# Patient Record
Sex: FEMALE | Race: WHITE | Employment: UNEMPLOYED | URBAN - METROPOLITAN AREA
[De-identification: names, ages, dates, MRNs, and addresses within clinical notes are randomized per-mention and may not be internally consistent; named-entity substitution may affect disease eponyms.]

---

## 2020-04-06 ENCOUNTER — HOSPITAL ENCOUNTER (EMERGENCY)
Age: 45
Discharge: HOME | End: 2020-04-06
Attending: EMERGENCY MEDICINE
Payer: MEDICAID

## 2020-04-06 VITALS
SYSTOLIC BLOOD PRESSURE: 103 MMHG | BODY MASS INDEX: 20.62 KG/M2 | HEART RATE: 79 BPM | RESPIRATION RATE: 18 BRPM | TEMPERATURE: 98.1 F | OXYGEN SATURATION: 97 % | DIASTOLIC BLOOD PRESSURE: 61 MMHG | HEIGHT: 60 IN | WEIGHT: 105 LBS

## 2020-04-06 PROCEDURE — 99283 EMERGENCY DEPT VISIT LOW MDM: CPT

## 2020-04-06 PROCEDURE — 12032 INTMD RPR S/A/T/EXT 2.6-7.5: CPT

## 2020-04-06 RX ORDER — LAMOTRIGINE 100 MG/1
100 TABLET ORAL DAILY
Status: ON HOLD | COMMUNITY
End: 2022-09-21 | Stop reason: HOSPADM

## 2020-04-06 RX ORDER — METHADONE HYDROCHLORIDE 40 MG/1
95 TABLET ORAL DAILY
Status: ON HOLD | COMMUNITY
End: 2022-09-21 | Stop reason: HOSPADM

## 2020-04-06 RX ORDER — BACITRACIN, NEOMYCIN, POLYMYXIN B 400; 3.5; 5 [USP'U]/G; MG/G; [USP'U]/G
OINTMENT TOPICAL
Status: DISCONTINUED
Start: 2020-04-06 | End: 2020-04-06 | Stop reason: HOSPADM

## 2020-04-06 RX ORDER — LIDOCAINE HYDROCHLORIDE 10 MG/ML
INJECTION, SOLUTION EPIDURAL; INFILTRATION; INTRACAUDAL; PERINEURAL
Status: DISCONTINUED
Start: 2020-04-06 | End: 2020-04-06 | Stop reason: HOSPADM

## 2020-04-06 ASSESSMENT — PAIN DESCRIPTION - DESCRIPTORS: DESCRIPTORS: BURNING

## 2020-04-06 ASSESSMENT — PAIN DESCRIPTION - ORIENTATION: ORIENTATION: RIGHT

## 2020-04-06 ASSESSMENT — PAIN SCALES - GENERAL: PAINLEVEL_OUTOF10: 8

## 2020-04-06 NOTE — ED PROVIDER NOTES
4/20/2020  You will receive a call to assist you, in setting up a primary care physician. See for suture removal in 10-14 days. Discharge Medications:  New Prescriptions    No medications on file       FINAL IMPRESSION  1. Laceration of right forearm, initial encounter        Blood pressure 103/61, pulse 79, temperature 98.1 °F (36.7 °C), temperature source Oral, resp. rate 18, height 5' (1.524 m), weight 105 lb (47.6 kg), last menstrual period 03/09/2020, SpO2 97 %. DISPOSITION  Patient was discharged to home in good condition. This chart was generated using the [address] [place] [address] dictation system. I created this record but it may contain dictation errors given the limitations of this technology.        Alexandra Sorto MD  04/06/20 2477

## 2020-04-06 NOTE — DISCHARGE INSTR - COC
Continuity of Care Form    Patient Name: Kan Landon   :  1975  MRN:  [id number]    Admit date:  2020  Discharge date:  ***    Code Status Order: No Order   Advance Directives:     Admitting Physician:  No admitting provider for patient encounter. PCP: No primary care provider on file. Discharging Nurse: [facility] Unit/Room#: ED-0007/07  Discharging Unit Phone Number: ***    Emergency Contact:   Extended Emergency Contact Information  Primary Emergency Contact: Radha Koch  Home Phone: [phone]  Relation: Spouse   needed? No    Past Surgical History:  Past Surgical History:   Procedure Laterality Date     SECTION         Immunization History: There is no immunization history on file for this patient. Active Problems: There is no problem list on file for this patient.       Isolation/Infection:   Isolation          No Isolation        Patient Infection Status     None to display          Nurse Assessment:  Last Vital Signs: /61   Pulse 79   Temp 98.1 °F (36.7 °C) (Oral)   Resp 18   Ht 5' (1.524 m)   Wt 105 lb (47.6 kg)   LMP 2020 (Within Weeks)   SpO2 97%   BMI 20.51 kg/m²     Last documented pain score (0-10 scale): Pain Level: 8  Last Weight:   Wt Readings from Last 1 Encounters:   20 105 lb (47.6 kg)     Mental Status:  {IP PT MENTAL STATUS:83335}    IV Access:  { ROBERT IV ACCESS:203804063}    Nursing Mobility/ADLs:  Walking   {CHP DME EBYQ:663721096}  Transfer  {CHP DME QXYD:180177033}  Bathing  {CHP DME GLRS:561093508}  Dressing  {CHP DME GGMX:140592945}  Toileting  {CHP DME LMHF:903595968}  Feeding  {CHP DME VWXP:731626235}  Med Admin  {CHP DME CLGI:315647445}  Med Delivery   { ROBERT MED Delivery:236427962}    Wound Care Documentation and Therapy:        Elimination:  Continence:   · Bowel: {YES / DS:16070}  · Bladder: {YES / RS:49699}  Urinary Catheter: {Urinary Catheter:324319758}   Colostomy/Ileostomy/Ileal

## 2020-04-06 NOTE — ED NOTES
Right arm cleaned with saline. 3 small superficial scrapes noted to hand. PSO applied to sutured area, and wound covered with adaptic and 3 inch Abigail. Pt instructed on care instructions. Discharged as noted.      Harshad Matute RN  04/06/20 5254

## 2022-09-12 ENCOUNTER — APPOINTMENT (OUTPATIENT)
Dept: CT IMAGING | Age: 47
DRG: 720 | End: 2022-09-12
Payer: MEDICARE

## 2022-09-12 ENCOUNTER — HOSPITAL ENCOUNTER (INPATIENT)
Age: 47
LOS: 9 days | Discharge: HOME | DRG: 720 | End: 2022-09-21
Attending: EMERGENCY MEDICINE | Admitting: INTERNAL MEDICINE
Payer: MEDICARE

## 2022-09-12 DIAGNOSIS — N30.00 ACUTE CYSTITIS WITHOUT HEMATURIA: ICD-10-CM

## 2022-09-12 DIAGNOSIS — A41.9 SEVERE SEPSIS (HCC): ICD-10-CM

## 2022-09-12 DIAGNOSIS — G93.40 ENCEPHALOPATHY: ICD-10-CM

## 2022-09-12 DIAGNOSIS — L03.317 CELLULITIS OF BUTTOCK: ICD-10-CM

## 2022-09-12 DIAGNOSIS — F19.10 POLYSUBSTANCE ABUSE (HCC): Primary | ICD-10-CM

## 2022-09-12 DIAGNOSIS — R29.6 UNWITNESSED FALL: ICD-10-CM

## 2022-09-12 DIAGNOSIS — R65.20 SEVERE SEPSIS (HCC): ICD-10-CM

## 2022-09-12 DIAGNOSIS — E16.2 HYPOGLYCEMIA: ICD-10-CM

## 2022-09-12 DIAGNOSIS — S32.10XA CLOSED FRACTURE OF SACRUM, UNSPECIFIED PORTION OF SACRUM, INITIAL ENCOUNTER (HCC): ICD-10-CM

## 2022-09-12 DIAGNOSIS — Z51.81 THERAPEUTIC DRUG MONITORING: ICD-10-CM

## 2022-09-12 LAB
A/G RATIO: 0.6 (ref 1.1–2.2)
ALBUMIN SERPL-MCNC: 2.8 G/DL (ref 3.4–5)
ALP BLD-CCNC: 85 U/L (ref 40–129)
ALT SERPL-CCNC: 25 U/L (ref 10–40)
AMPHETAMINE SCREEN, URINE: POSITIVE
ANION GAP SERPL CALCULATED.3IONS-SCNC: 13 MMOL/L (ref 3–16)
AST SERPL-CCNC: 46 U/L (ref 15–37)
BACTERIA: ABNORMAL /HPF
BARBITURATE SCREEN URINE: ABNORMAL
BASOPHILS ABSOLUTE: 0.1 K/UL (ref 0–0.2)
BASOPHILS RELATIVE PERCENT: 0.7 %
BENZODIAZEPINE SCREEN, URINE: ABNORMAL
BILIRUB SERPL-MCNC: 0.5 MG/DL (ref 0–1)
BILIRUBIN URINE: NEGATIVE
BLOOD, URINE: NEGATIVE
BUN BLDV-MCNC: 10 MG/DL (ref 7–20)
CALCIUM SERPL-MCNC: 9.1 MG/DL (ref 8.3–10.6)
CANNABINOID SCREEN URINE: ABNORMAL
CHLORIDE BLD-SCNC: 96 MMOL/L (ref 99–110)
CLARITY: ABNORMAL
CO2: 25 MMOL/L (ref 21–32)
COCAINE METABOLITE SCREEN URINE: POSITIVE
COLOR: ABNORMAL
CREAT SERPL-MCNC: 0.5 MG/DL (ref 0.6–1.1)
EOSINOPHILS ABSOLUTE: 0.1 K/UL (ref 0–0.6)
EOSINOPHILS RELATIVE PERCENT: 0.5 %
EPITHELIAL CELLS, UA: 2 /HPF (ref 0–5)
ETHANOL: NORMAL MG/DL (ref 0–0.08)
FENTANYL SCREEN, URINE: POSITIVE
GFR AFRICAN AMERICAN: >60
GFR NON-AFRICAN AMERICAN: >60
GLUCOSE BLD-MCNC: 67 MG/DL (ref 70–99)
GLUCOSE URINE: NEGATIVE MG/DL
HCG QUALITATIVE: NEGATIVE
HCT VFR BLD CALC: 34.9 % (ref 36–48)
HEMOGLOBIN: 11.5 G/DL (ref 12–16)
HYALINE CASTS: 0 /LPF (ref 0–8)
INR BLD: 1.12 (ref 0.87–1.14)
KETONES, URINE: ABNORMAL MG/DL
LACTIC ACID, SEPSIS: 1.5 MMOL/L (ref 0.4–1.9)
LEUKOCYTE ESTERASE, URINE: ABNORMAL
LYMPHOCYTES ABSOLUTE: 2.4 K/UL (ref 1–5.1)
LYMPHOCYTES RELATIVE PERCENT: 13 %
Lab: ABNORMAL
MAGNESIUM: 1.8 MG/DL (ref 1.8–2.4)
MCH RBC QN AUTO: 28.3 PG (ref 26–34)
MCHC RBC AUTO-ENTMCNC: 32.9 G/DL (ref 31–36)
MCV RBC AUTO: 86 FL (ref 80–100)
METHADONE SCREEN, URINE: ABNORMAL
MICROSCOPIC EXAMINATION: YES
MONOCYTES ABSOLUTE: 1.1 K/UL (ref 0–1.3)
MONOCYTES RELATIVE PERCENT: 5.9 %
NEUTROPHILS ABSOLUTE: 14.9 K/UL (ref 1.7–7.7)
NEUTROPHILS RELATIVE PERCENT: 79.9 %
NITRITE, URINE: NEGATIVE
OPIATE SCREEN URINE: ABNORMAL
OXYCODONE URINE: ABNORMAL
PDW BLD-RTO: 16.4 % (ref 12.4–15.4)
PH UA: 6.5
PH UA: 6.5 (ref 5–8)
PHENCYCLIDINE SCREEN URINE: ABNORMAL
PLATELET # BLD: 530 K/UL (ref 135–450)
PMV BLD AUTO: 7.2 FL (ref 5–10.5)
POTASSIUM REFLEX MAGNESIUM: 3.2 MMOL/L (ref 3.5–5.1)
PRO-BNP: 1114 PG/ML (ref 0–124)
PROCALCITONIN: 0.41 NG/ML (ref 0–0.15)
PROTEIN UA: ABNORMAL MG/DL
PROTHROMBIN TIME: 14.3 SEC (ref 11.7–14.5)
RBC # BLD: 4.06 M/UL (ref 4–5.2)
RBC UA: 2 /HPF (ref 0–4)
REASON FOR REJECTION: NORMAL
REJECTED TEST: NORMAL
SODIUM BLD-SCNC: 134 MMOL/L (ref 136–145)
SPECIFIC GRAVITY UA: 1.02 (ref 1–1.03)
TOTAL PROTEIN: 7.2 G/DL (ref 6.4–8.2)
TROPONIN: <0.01 NG/ML
URINE REFLEX TO CULTURE: YES
URINE TYPE: ABNORMAL
UROBILINOGEN, URINE: 1 E.U./DL
WBC # BLD: 18.6 K/UL (ref 4–11)
WBC UA: 29 /HPF (ref 0–5)

## 2022-09-12 PROCEDURE — 93005 ELECTROCARDIOGRAM TRACING: CPT | Performed by: PHYSICIAN ASSISTANT

## 2022-09-12 PROCEDURE — 70450 CT HEAD/BRAIN W/O DYE: CPT

## 2022-09-12 PROCEDURE — 83880 ASSAY OF NATRIURETIC PEPTIDE: CPT

## 2022-09-12 PROCEDURE — 84703 CHORIONIC GONADOTROPIN ASSAY: CPT

## 2022-09-12 PROCEDURE — 80175 DRUG SCREEN QUAN LAMOTRIGINE: CPT

## 2022-09-12 PROCEDURE — 84145 PROCALCITONIN (PCT): CPT

## 2022-09-12 PROCEDURE — 85025 COMPLETE CBC W/AUTO DIFF WBC: CPT

## 2022-09-12 PROCEDURE — 84484 ASSAY OF TROPONIN QUANT: CPT

## 2022-09-12 PROCEDURE — 74177 CT ABD & PELVIS W/CONTRAST: CPT

## 2022-09-12 PROCEDURE — 2060000000 HC ICU INTERMEDIATE R&B

## 2022-09-12 PROCEDURE — 80307 DRUG TEST PRSMV CHEM ANLYZR: CPT

## 2022-09-12 PROCEDURE — 36415 COLL VENOUS BLD VENIPUNCTURE: CPT

## 2022-09-12 PROCEDURE — 6360000004 HC RX CONTRAST MEDICATION: Performed by: EMERGENCY MEDICINE

## 2022-09-12 PROCEDURE — 96375 TX/PRO/DX INJ NEW DRUG ADDON: CPT

## 2022-09-12 PROCEDURE — 81001 URINALYSIS AUTO W/SCOPE: CPT

## 2022-09-12 PROCEDURE — 83605 ASSAY OF LACTIC ACID: CPT

## 2022-09-12 PROCEDURE — 71260 CT THORAX DX C+: CPT | Performed by: PHYSICIAN ASSISTANT

## 2022-09-12 PROCEDURE — 83735 ASSAY OF MAGNESIUM: CPT

## 2022-09-12 PROCEDURE — 85610 PROTHROMBIN TIME: CPT

## 2022-09-12 PROCEDURE — 80053 COMPREHEN METABOLIC PANEL: CPT

## 2022-09-12 PROCEDURE — 82077 ASSAY SPEC XCP UR&BREATH IA: CPT

## 2022-09-12 PROCEDURE — 72125 CT NECK SPINE W/O DYE: CPT

## 2022-09-12 PROCEDURE — 87040 BLOOD CULTURE FOR BACTERIA: CPT

## 2022-09-12 PROCEDURE — 87086 URINE CULTURE/COLONY COUNT: CPT

## 2022-09-12 PROCEDURE — 96365 THER/PROPH/DIAG IV INF INIT: CPT

## 2022-09-12 PROCEDURE — 99285 EMERGENCY DEPT VISIT HI MDM: CPT

## 2022-09-12 PROCEDURE — 2580000003 HC RX 258: Performed by: EMERGENCY MEDICINE

## 2022-09-12 PROCEDURE — 6360000002 HC RX W HCPCS: Performed by: EMERGENCY MEDICINE

## 2022-09-12 PROCEDURE — 96372 THER/PROPH/DIAG INJ SC/IM: CPT

## 2022-09-12 RX ORDER — MIDAZOLAM HYDROCHLORIDE 2 MG/2ML
2 INJECTION, SOLUTION INTRAMUSCULAR; INTRAVENOUS
Status: COMPLETED | OUTPATIENT
Start: 2022-09-12 | End: 2022-09-12

## 2022-09-12 RX ORDER — DIPHENHYDRAMINE HYDROCHLORIDE 50 MG/ML
50 INJECTION INTRAMUSCULAR; INTRAVENOUS ONCE
Status: COMPLETED | OUTPATIENT
Start: 2022-09-12 | End: 2022-09-12

## 2022-09-12 RX ORDER — ONDANSETRON 2 MG/ML
4 INJECTION INTRAMUSCULAR; INTRAVENOUS EVERY 6 HOURS PRN
Status: DISCONTINUED | OUTPATIENT
Start: 2022-09-12 | End: 2022-09-21 | Stop reason: HOSPADM

## 2022-09-12 RX ORDER — 0.9 % SODIUM CHLORIDE 0.9 %
30 INTRAVENOUS SOLUTION INTRAVENOUS ONCE
Status: COMPLETED | OUTPATIENT
Start: 2022-09-12 | End: 2022-09-12

## 2022-09-12 RX ORDER — MAGNESIUM SULFATE IN WATER 40 MG/ML
2000 INJECTION, SOLUTION INTRAVENOUS PRN
Status: DISCONTINUED | OUTPATIENT
Start: 2022-09-12 | End: 2022-09-21 | Stop reason: HOSPADM

## 2022-09-12 RX ORDER — DIPHENHYDRAMINE HYDROCHLORIDE 50 MG/ML
25 INJECTION INTRAMUSCULAR; INTRAVENOUS ONCE
Status: DISCONTINUED | OUTPATIENT
Start: 2022-09-12 | End: 2022-09-12

## 2022-09-12 RX ORDER — ACETAMINOPHEN 325 MG/1
650 TABLET ORAL EVERY 6 HOURS PRN
Status: DISCONTINUED | OUTPATIENT
Start: 2022-09-12 | End: 2022-09-21 | Stop reason: HOSPADM

## 2022-09-12 RX ORDER — HALOPERIDOL 5 MG/ML
2.5 INJECTION INTRAMUSCULAR ONCE
Status: DISCONTINUED | OUTPATIENT
Start: 2022-09-12 | End: 2022-09-12

## 2022-09-12 RX ORDER — LAMOTRIGINE 100 MG/1
100 TABLET ORAL DAILY
Status: DISCONTINUED | OUTPATIENT
Start: 2022-09-13 | End: 2022-09-21 | Stop reason: HOSPADM

## 2022-09-12 RX ORDER — DEXTROSE MONOHYDRATE 100 MG/ML
25 INJECTION, SOLUTION INTRAVENOUS ONCE
Status: COMPLETED | OUTPATIENT
Start: 2022-09-12 | End: 2022-09-12

## 2022-09-12 RX ORDER — POTASSIUM CHLORIDE 7.45 MG/ML
10 INJECTION INTRAVENOUS PRN
Status: DISCONTINUED | OUTPATIENT
Start: 2022-09-12 | End: 2022-09-13

## 2022-09-12 RX ORDER — SODIUM CHLORIDE 0.9 % (FLUSH) 0.9 %
10 SYRINGE (ML) INJECTION PRN
Status: DISCONTINUED | OUTPATIENT
Start: 2022-09-12 | End: 2022-09-21 | Stop reason: HOSPADM

## 2022-09-12 RX ORDER — ENOXAPARIN SODIUM 100 MG/ML
30 INJECTION SUBCUTANEOUS DAILY
Status: DISCONTINUED | OUTPATIENT
Start: 2022-09-13 | End: 2022-09-14

## 2022-09-12 RX ORDER — SODIUM CHLORIDE 9 MG/ML
INJECTION, SOLUTION INTRAVENOUS PRN
Status: DISCONTINUED | OUTPATIENT
Start: 2022-09-12 | End: 2022-09-21 | Stop reason: HOSPADM

## 2022-09-12 RX ORDER — SODIUM CHLORIDE 0.9 % (FLUSH) 0.9 %
10 SYRINGE (ML) INJECTION EVERY 12 HOURS SCHEDULED
Status: DISCONTINUED | OUTPATIENT
Start: 2022-09-12 | End: 2022-09-21 | Stop reason: HOSPADM

## 2022-09-12 RX ORDER — MIDAZOLAM HYDROCHLORIDE 2 MG/2ML
2 INJECTION, SOLUTION INTRAMUSCULAR; INTRAVENOUS
Status: DISCONTINUED | OUTPATIENT
Start: 2022-09-12 | End: 2022-09-12

## 2022-09-12 RX ORDER — PROMETHAZINE HYDROCHLORIDE 25 MG/1
12.5 TABLET ORAL EVERY 6 HOURS PRN
Status: DISCONTINUED | OUTPATIENT
Start: 2022-09-12 | End: 2022-09-21 | Stop reason: HOSPADM

## 2022-09-12 RX ORDER — HALOPERIDOL 5 MG/ML
5 INJECTION INTRAMUSCULAR ONCE
Status: COMPLETED | OUTPATIENT
Start: 2022-09-12 | End: 2022-09-12

## 2022-09-12 RX ORDER — ACETAMINOPHEN 650 MG/1
650 SUPPOSITORY RECTAL EVERY 6 HOURS PRN
Status: DISCONTINUED | OUTPATIENT
Start: 2022-09-12 | End: 2022-09-21 | Stop reason: HOSPADM

## 2022-09-12 RX ADMIN — DIPHENHYDRAMINE HYDROCHLORIDE 50 MG: 50 INJECTION INTRAMUSCULAR; INTRAVENOUS at 17:25

## 2022-09-12 RX ADMIN — SODIUM CHLORIDE 1428 ML: 9 INJECTION, SOLUTION INTRAVENOUS at 20:12

## 2022-09-12 RX ADMIN — VANCOMYCIN HYDROCHLORIDE 750 MG: 750 INJECTION, POWDER, LYOPHILIZED, FOR SOLUTION INTRAVENOUS at 20:55

## 2022-09-12 RX ADMIN — IOPAMIDOL 75 ML: 755 INJECTION, SOLUTION INTRAVENOUS at 19:31

## 2022-09-12 RX ADMIN — DEXTROSE MONOHYDRATE 25 G: 100 INJECTION, SOLUTION INTRAVENOUS at 22:15

## 2022-09-12 RX ADMIN — MEROPENEM 1000 MG: 1 INJECTION, POWDER, FOR SOLUTION INTRAVENOUS at 20:18

## 2022-09-12 RX ADMIN — MIDAZOLAM HYDROCHLORIDE 2 MG: 1 INJECTION, SOLUTION INTRAMUSCULAR; INTRAVENOUS at 17:09

## 2022-09-12 RX ADMIN — HALOPERIDOL LACTATE 5 MG: 5 INJECTION, SOLUTION INTRAMUSCULAR at 17:26

## 2022-09-12 ASSESSMENT — ENCOUNTER SYMPTOMS
RESPIRATORY NEGATIVE: 1
PHOTOPHOBIA: 0
COLOR CHANGE: 0
VOMITING: 0
NAUSEA: 0
BACK PAIN: 1
ABDOMINAL PAIN: 0
CONSTIPATION: 0
SHORTNESS OF BREATH: 0
DIARRHEA: 0
CHEST TIGHTNESS: 0
COUGH: 0

## 2022-09-12 ASSESSMENT — PAIN DESCRIPTION - LOCATION: LOCATION: HIP

## 2022-09-12 ASSESSMENT — PAIN SCALES - GENERAL: PAINLEVEL_OUTOF10: 8

## 2022-09-12 ASSESSMENT — PAIN DESCRIPTION - ORIENTATION: ORIENTATION: LEFT

## 2022-09-12 ASSESSMENT — PAIN - FUNCTIONAL ASSESSMENT: PAIN_FUNCTIONAL_ASSESSMENT: 0-10

## 2022-09-12 NOTE — ED PROVIDER NOTES
I independently saw performed a substantive portion of the visit (history, physical, and MDM) on Leola Riojas. All diagnostic, treatment, and disposition decisions were made by myself in conjunction with the advanced practice provider. I have participated in the medical decision making and directed the treatment plan and disposition of the patient. For further details of [facility] emergency department encounter, please see the advanced practice provider's documentation. Oral Hernandez MD, am the primary physician provider of record. CHIEF COMPLAINT  Chief Complaint   Patient presents with    Fall     From parking lot via EMS with police accompanying with cc fall with injury to left lower back/hip/buttocks. Pt endorses fentnyl and crack-cocaine use. Briefly, Leola Riojas is a 42 y.o. female  who presents to the ED complaining of being accompanied by police apparently was in a gas station parking lot when she fell. She uses a walker at baseline and has a history of polysubstance abuse. She does endorse fentanyl injection use as well as crack cocaine use today. She is extremely restless. She also complains of significant redness and swelling with pain to the left lower back and buttocks but denies any injection to this area specifically. Please tell me that she is currently under arrest but here for her medical evaluation. FOCUSED PHYSICAL EXAMINATION  /67   Pulse 77   Temp 98.8 °F (37.1 °C) (Oral)   Resp (!) 32   Ht 5' (1.524 m)   Wt 105 lb (47.6 kg)   LMP  (LMP Unknown)   SpO2 96%   BMI 20.51 kg/m²    Focused physical examination notable for restless, distracted, tachycardic regular rhythm, clear to auscultation bilaterally. No cervical or thoracic spine tenderness.   No midline lumbar spine tenderness but there is left paraspinal and left buttocks redness swelling and warmth appreciated without pustules or any specific areas of induration or fluctuance noted. No obvious facial droop. Pressured speech, findings consistent with stimulant toxidrome, miotic pupils as well. No obvious cranial nerve deficits. The 12 lead EKG was interpreted by me as follows:  Rate: normal with a rate of 79  Rhythm: sinus  Axis: right deviation  Intervals: normal WA, narrow QRS, normal QTc  ST segments: no ST elevations or depressions  T waves: no abnormal inversions  Non-specific T wave changes: not present  Prior EKG comparison: No prior is currently available for comparison    MDM:  ED course was notable for concern for polysubstance abuse with known amphetamine, cocaine and fentanyl all consistent with her drug screen, did require some chemical sedation in order to obtain her work-up today. She does have a urinary tract infection and also left buttocks cellulitis. Imaging was obtained showing CT head/Cspine without traumatic findings, CT chest showing no PE or PNA and CT abd pelv showing L SI joint with fracture around the SI joint - possibly pathologic but in the setting of trauma this is possible too. No abscess over the back/buttocks. Has an elevated leukocytosis despite a normal lactate, procalcitonin also elevated all suggesting bacterial infectious process. Patient has mild hypokalemia but no OMER. Sugar also slightly low at 67. She will be given sugar for this as well as sepsis protocol fluid resuscitation's and broad-spectrum antibiotics. Of note overall there were some delays in care as a result of the patient's agitation and uncooperative state requiring some chemical sedation in order to proceed with her work-up. Is this patient to be included in the SEP-1 Core Measure? Yes   SEP-1 CORE MEASURE DATA      Sepsis Criteria   Severe Sepsis Criteria   Septic Shock Criteria     Must be confirmed or suspected to move forward with diagnosis of sepsis.     Must meet 2:    [] Temperature > 100.9 F (38.3 C)        or < 96.8 F (36 C)  [x] HR > 90  [x] RR > 20  [x] WBC > 12 or < 4 or 10% bands      AND:      [x] Infection Confirmed or        Suspected. Must meet 1:    [] Lactate > 2       or   [x] Signs of Organ Dysfunction:    - SBP < 90 or MAP < 65  - Altered mental status  - Creatinine > 2 or increased from      baseline  - Urine Output < 0.5 ml/kg/hr  - Bilirubin > 2  - INR > 1.5 (not anticoagulated)  - Platelets < 100,410  - Acute Respiratory Failure as     evidenced by new need for NIPPV     or mechanical ventilation      [] No criteria met for Severe Sepsis. Must meet 1:    [] Lactate > 4        or   [] SBP < 90 or MAP < 65 for at        least two readings in the first        hour after fluid bolus        administration      [] Vasopressors initiated (if hypotension persists after fluid resuscitation)        [x] No criteria met for Septic Shock. Patient Vitals for the past 6 hrs:   BP Temp Pulse Resp SpO2 Height Weight Weight Method Percent Weight Change   09/12/22 1606 111/76 98.8 °F (37.1 °C) (!) 106 (!) 32 96 % 5' (1.524 m) 105 lb (47.6 kg) Estimated 0   09/12/22 1955 109/67 -- 77 -- -- -- -- -- --      Recent Labs     09/12/22  1702 09/12/22  1814   WBC 18.6*  --    CREATININE  --  0.5*   BILITOT  --  0.5   INR 1.12  --    *  --          Time Severe Sepsis Identified: 7:30pm    Fluid Resuscitation Rational: at least 30mL/kg based on entered actual weight at time of triage      Repeat lactate level: not indicated due to initial lactate < 2    Reassessment Exam:   Not applicable. Patient does not have septic shock.       PROCEDURE NOTE - Ultrasound guided peripheral IV placement  Indication: unable to obtain adequate PIV access without ultrasound for medication administration, fluid resuscitation and/or lab draw    Views:  Long access view of target vein: Adequate  Short access view of target vein: Adequate    Images obtained with findings:   Vein compressible: yes  Needle tip within vein: yes    Impression:   Successful cannulation of vein: interpretation, medication management, obtaining critical history from collateral sources if the patient is unable to provide it directly, and physician consultation. Specifics of interventions taken and potentially life-threatening diagnostic considerations are listed above in the medical decision making. If this was a shared visit with an AMARI, the time in this attestation is non-concurrent critical care time out of the total shared critical care time provided by the AMARI and myself. This chart was created using Dragon dictation software. Efforts were made by me to ensure accuracy, however some errors may be present due to limitations of this technology.             Hi Llamas MD  09/12/22 2029

## 2022-09-13 PROBLEM — F11.10 FENTANYL USE DISORDER, MILD, ABUSE (HCC): Status: ACTIVE | Noted: 2022-09-13

## 2022-09-13 PROBLEM — F14.10 COCAINE ABUSE (HCC): Status: ACTIVE | Noted: 2022-09-13

## 2022-09-13 PROBLEM — Z11.4 SCREENING FOR HIV (HUMAN IMMUNODEFICIENCY VIRUS): Status: ACTIVE | Noted: 2022-09-13

## 2022-09-13 PROBLEM — F19.10 POLYSUBSTANCE ABUSE (HCC): Status: ACTIVE | Noted: 2022-09-13

## 2022-09-13 PROBLEM — B18.2 CHRONIC HEPATITIS C WITHOUT HEPATIC COMA (HCC): Status: ACTIVE | Noted: 2022-09-13

## 2022-09-13 PROBLEM — L03.317 CELLULITIS OF BUTTOCK: Status: ACTIVE | Noted: 2022-09-13

## 2022-09-13 PROBLEM — G93.41 ACUTE METABOLIC ENCEPHALOPATHY: Status: ACTIVE | Noted: 2022-09-13

## 2022-09-13 PROBLEM — A41.9 SEVERE SEPSIS (HCC): Status: ACTIVE | Noted: 2022-09-13

## 2022-09-13 PROBLEM — E16.2 HYPOGLYCEMIA: Status: ACTIVE | Noted: 2022-09-13

## 2022-09-13 PROBLEM — R79.82 ELEVATED C-REACTIVE PROTEIN (CRP): Status: ACTIVE | Noted: 2022-09-13

## 2022-09-13 PROBLEM — D72.825 BANDEMIA: Status: ACTIVE | Noted: 2022-09-13

## 2022-09-13 PROBLEM — N30.00 ACUTE CYSTITIS WITHOUT HEMATURIA: Status: ACTIVE | Noted: 2022-09-13

## 2022-09-13 PROBLEM — F15.10 AMPHETAMINE ABUSE (HCC): Status: ACTIVE | Noted: 2022-09-13
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
## 2022-09-13 PROBLEM — R29.6 UNWITNESSED FALL: Status: ACTIVE | Noted: 2022-09-13

## 2022-09-13 PROBLEM — J43.9 PULMONARY EMPHYSEMA (HCC): Status: ACTIVE | Noted: 2022-09-13

## 2022-09-13 PROBLEM — R70.0 ELEVATED SED RATE: Status: ACTIVE | Noted: 2022-09-13

## 2022-09-13 PROBLEM — S32.10XA SACRAL FRACTURE, CLOSED (HCC): Status: ACTIVE | Noted: 2022-09-13

## 2022-09-13 PROBLEM — J98.11 MILD BIBASILAR ATELECTASIS: Status: ACTIVE | Noted: 2022-09-13

## 2022-09-13 PROBLEM — M46.58 SEPTIC ARTHRITIS OF SACROILIAC JOINT (HCC): Status: ACTIVE | Noted: 2022-09-13

## 2022-09-13 PROBLEM — R65.20 SEVERE SEPSIS (HCC): Status: ACTIVE | Noted: 2022-09-13

## 2022-09-13 LAB
A/G RATIO: 0.6 (ref 1.1–2.2)
ALBUMIN SERPL-MCNC: 2.5 G/DL (ref 3.4–5)
ALP BLD-CCNC: 77 U/L (ref 40–129)
ALT SERPL-CCNC: 22 U/L (ref 10–40)
AMMONIA: 23 UMOL/L (ref 11–51)
ANION GAP SERPL CALCULATED.3IONS-SCNC: 10 MMOL/L (ref 3–16)
AST SERPL-CCNC: 45 U/L (ref 15–37)
BASOPHILS ABSOLUTE: 0.1 K/UL (ref 0–0.2)
BASOPHILS RELATIVE PERCENT: 0.8 %
BILIRUB SERPL-MCNC: 0.5 MG/DL (ref 0–1)
BUN BLDV-MCNC: 6 MG/DL (ref 7–20)
C-REACTIVE PROTEIN: 94.4 MG/L (ref 0–5.1)
CALCIUM SERPL-MCNC: 8.9 MG/DL (ref 8.3–10.6)
CHLORIDE BLD-SCNC: 102 MMOL/L (ref 99–110)
CO2: 25 MMOL/L (ref 21–32)
CREAT SERPL-MCNC: <0.5 MG/DL (ref 0.6–1.1)
EKG ATRIAL RATE: 79 BPM
EKG DIAGNOSIS: NORMAL
EKG P AXIS: 67 DEGREES
EKG P-R INTERVAL: 158 MS
EKG Q-T INTERVAL: 400 MS
EKG QRS DURATION: 82 MS
EKG QTC CALCULATION (BAZETT): 458 MS
EKG R AXIS: 90 DEGREES
EKG T AXIS: 71 DEGREES
EKG VENTRICULAR RATE: 79 BPM
EOSINOPHILS ABSOLUTE: 0.1 K/UL (ref 0–0.6)
EOSINOPHILS RELATIVE PERCENT: 1.4 %
GFR AFRICAN AMERICAN: >60
GFR NON-AFRICAN AMERICAN: >60
GLUCOSE BLD-MCNC: 74 MG/DL (ref 70–99)
HBV SURFACE AB TITR SER: <3.5 MIU/ML
HCT VFR BLD CALC: 33.1 % (ref 36–48)
HEMOGLOBIN: 10.9 G/DL (ref 12–16)
LV EF: 60 %
LVEF MODALITY: NORMAL
LYMPHOCYTES ABSOLUTE: 1.4 K/UL (ref 1–5.1)
LYMPHOCYTES RELATIVE PERCENT: 12.6 %
MAGNESIUM: 2.1 MG/DL (ref 1.8–2.4)
MCH RBC QN AUTO: 28.4 PG (ref 26–34)
MCHC RBC AUTO-ENTMCNC: 33 G/DL (ref 31–36)
MCV RBC AUTO: 86 FL (ref 80–100)
MONOCYTES ABSOLUTE: 0.6 K/UL (ref 0–1.3)
MONOCYTES RELATIVE PERCENT: 5.6 %
NEUTROPHILS ABSOLUTE: 8.5 K/UL (ref 1.7–7.7)
NEUTROPHILS RELATIVE PERCENT: 79.6 %
PDW BLD-RTO: 16.5 % (ref 12.4–15.4)
PLATELET # BLD: 493 K/UL (ref 135–450)
PMV BLD AUTO: 6.5 FL (ref 5–10.5)
POTASSIUM REFLEX MAGNESIUM: 3.5 MMOL/L (ref 3.5–5.1)
RBC # BLD: 3.85 M/UL (ref 4–5.2)
SEDIMENTATION RATE, ERYTHROCYTE: 66 MM/HR (ref 0–20)
SODIUM BLD-SCNC: 137 MMOL/L (ref 136–145)
TOTAL PROTEIN: 6.5 G/DL (ref 6.4–8.2)
URINE CULTURE, ROUTINE: NORMAL
WBC # BLD: 10.7 K/UL (ref 4–11)

## 2022-09-13 PROCEDURE — 36415 COLL VENOUS BLD VENIPUNCTURE: CPT

## 2022-09-13 PROCEDURE — 86706 HEP B SURFACE ANTIBODY: CPT

## 2022-09-13 PROCEDURE — 85025 COMPLETE CBC W/AUTO DIFF WBC: CPT

## 2022-09-13 PROCEDURE — 82140 ASSAY OF AMMONIA: CPT

## 2022-09-13 PROCEDURE — 83735 ASSAY OF MAGNESIUM: CPT

## 2022-09-13 PROCEDURE — 2060000000 HC ICU INTERMEDIATE R&B

## 2022-09-13 PROCEDURE — 86701 HIV-1ANTIBODY: CPT

## 2022-09-13 PROCEDURE — 93010 ELECTROCARDIOGRAM REPORT: CPT | Performed by: INTERNAL MEDICINE

## 2022-09-13 PROCEDURE — 6360000002 HC RX W HCPCS: Performed by: INTERNAL MEDICINE

## 2022-09-13 PROCEDURE — 85652 RBC SED RATE AUTOMATED: CPT

## 2022-09-13 PROCEDURE — 87390 HIV-1 AG IA: CPT

## 2022-09-13 PROCEDURE — 87522 HEPATITIS C REVRS TRNSCRPJ: CPT

## 2022-09-13 PROCEDURE — 86702 HIV-2 ANTIBODY: CPT

## 2022-09-13 PROCEDURE — 2580000003 HC RX 258: Performed by: INTERNAL MEDICINE

## 2022-09-13 PROCEDURE — 86140 C-REACTIVE PROTEIN: CPT

## 2022-09-13 PROCEDURE — 93306 TTE W/DOPPLER COMPLETE: CPT

## 2022-09-13 PROCEDURE — 6370000000 HC RX 637 (ALT 250 FOR IP): Performed by: INTERNAL MEDICINE

## 2022-09-13 PROCEDURE — 99223 1ST HOSP IP/OBS HIGH 75: CPT | Performed by: INTERNAL MEDICINE

## 2022-09-13 PROCEDURE — 80053 COMPREHEN METABOLIC PANEL: CPT

## 2022-09-13 RX ORDER — METHOCARBAMOL 500 MG/1
750 TABLET, FILM COATED ORAL EVERY 6 HOURS PRN
Status: DISCONTINUED | OUTPATIENT
Start: 2022-09-13 | End: 2022-09-21 | Stop reason: HOSPADM

## 2022-09-13 RX ORDER — SODIUM PHOSPHATE, DIBASIC AND SODIUM PHOSPHATE, MONOBASIC 7; 19 G/133ML; G/133ML
1 ENEMA RECTAL ONCE
Status: DISCONTINUED | OUTPATIENT
Start: 2022-09-13 | End: 2022-09-15

## 2022-09-13 RX ORDER — PROMETHAZINE HYDROCHLORIDE 25 MG/1
25 TABLET ORAL EVERY 6 HOURS PRN
Status: DISCONTINUED | OUTPATIENT
Start: 2022-09-13 | End: 2022-09-21 | Stop reason: HOSPADM

## 2022-09-13 RX ORDER — TRAZODONE HYDROCHLORIDE 50 MG/1
50 TABLET ORAL NIGHTLY PRN
Status: DISCONTINUED | OUTPATIENT
Start: 2022-09-13 | End: 2022-09-21 | Stop reason: ALTCHOICE

## 2022-09-13 RX ORDER — DICYCLOMINE HYDROCHLORIDE 10 MG/1
20 CAPSULE ORAL EVERY 6 HOURS PRN
Status: DISCONTINUED | OUTPATIENT
Start: 2022-09-13 | End: 2022-09-21 | Stop reason: HOSPADM

## 2022-09-13 RX ORDER — GABAPENTIN 300 MG/1
300 CAPSULE ORAL EVERY 8 HOURS PRN
Status: DISCONTINUED | OUTPATIENT
Start: 2022-09-13 | End: 2022-09-21 | Stop reason: HOSPADM

## 2022-09-13 RX ORDER — POTASSIUM CHLORIDE 7.45 MG/ML
10 INJECTION INTRAVENOUS
Status: DISPENSED | OUTPATIENT
Start: 2022-09-13 | End: 2022-09-13

## 2022-09-13 RX ORDER — HYDROXYZINE PAMOATE 25 MG/1
50 CAPSULE ORAL EVERY 8 HOURS PRN
Status: DISCONTINUED | OUTPATIENT
Start: 2022-09-13 | End: 2022-09-21 | Stop reason: HOSPADM

## 2022-09-13 RX ORDER — TRAMADOL HYDROCHLORIDE 50 MG/1
50 TABLET ORAL PRN
Status: DISPENSED | OUTPATIENT
Start: 2022-09-13 | End: 2022-09-16

## 2022-09-13 RX ADMIN — VANCOMYCIN HYDROCHLORIDE 1250 MG: 10 INJECTION, POWDER, LYOPHILIZED, FOR SOLUTION INTRAVENOUS at 17:50

## 2022-09-13 RX ADMIN — MEROPENEM 1000 MG: 1 INJECTION, POWDER, FOR SOLUTION INTRAVENOUS at 19:41

## 2022-09-13 RX ADMIN — MEROPENEM 1000 MG: 1 INJECTION, POWDER, FOR SOLUTION INTRAVENOUS at 10:40

## 2022-09-13 RX ADMIN — LAMOTRIGINE 100 MG: 100 TABLET ORAL at 10:29

## 2022-09-13 RX ADMIN — POTASSIUM CHLORIDE 10 MEQ: 7.46 INJECTION, SOLUTION INTRAVENOUS at 06:18

## 2022-09-13 RX ADMIN — Medication 10 ML: at 10:30

## 2022-09-13 ASSESSMENT — ENCOUNTER SYMPTOMS
SORE THROAT: 0
EYE REDNESS: 0
CONSTIPATION: 0
SINUS PAIN: 0
COUGH: 0
RHINORRHEA: 0
EYE DISCHARGE: 0
WHEEZING: 0
DIARRHEA: 0
BACK PAIN: 0
SHORTNESS OF BREATH: 0
SINUS PRESSURE: 0
NAUSEA: 0
ABDOMINAL PAIN: 0

## 2022-09-13 ASSESSMENT — PAIN SCALES - GENERAL
PAINLEVEL_OUTOF10: 0
PAINLEVEL_OUTOF10: 0

## 2022-09-13 NOTE — ED PROVIDER NOTES
[facility]        Pt Name: Terrell Peck  MRN: [id number]  [place] 1975  Date of evaluation: 9/12/2022  Provider: ZINA Ortez  PCP: No primary care provider on file. Note Started: 8:17 PM EDT        I have seen and evaluated this patient with my supervising physician Kingsley Giang MD.    [address] [place] [address]       Chief Complaint   Patient presents with    Fall     From parking lot via EMS with police accompanying with cc fall with injury to left lower back/hip/buttocks. Pt endorses fentnyl and crack-cocaine use. HISTORY OF PRESENT ILLNESS   (Location, Timing/Onset, Context/Setting, Quality, Duration, Modifying Factors, Severity, Associated Signs and Symptoms)  Note limiting factors. Chief Complaint: Sandeep Fang is a 42 y.o. female with documented history of hepatitis C and heroin abuse who presents to the ED with complaint of a fall. She arrived by EMS with police custody after she apparently had a fall in a parking lot. She apparently was disoriented and yelling/aggressive so police were called and she was brought to the ED for further evaluation and treatment. Patient does admit to using fentanyl by injecting today around 11 or 12. Does admit to smoking crack cocaine as well. She denies any other drug or alcohol usage. She is complaint of pain to her left back and left hip/buttocks. Has been ongoing for quite some time. She cannot tell me when this pain actually started but apparently has been ongoing for the past several days. Denies any fever or chills. Denies chest pain, shortness of breath, cough, headache, head injury, loss of conscious, visual changes, speech disturbances or numbness/tingling. Denies any urinary symptoms or changes in bowel movements. Denies decreased range of motion or strength of the legs.   Denies any saddle anesthesia, bowel/bladder incontinence, urinary tension or distal numbness/tingling. States pain is aching rated 8/10. Nursing Notes were all reviewed and agreed with or any disagreements were addressed in the HPI. REVIEW OF SYSTEMS    (2-9 systems for level 4, 10 or more for level 5)     Review of Systems   Constitutional:  Positive for activity change. Negative for appetite change, chills, diaphoresis, fatigue and fever. Eyes:  Negative for photophobia and visual disturbance. Respiratory: Negative. Negative for cough, chest tightness and shortness of breath. Cardiovascular: Negative. Negative for chest pain, palpitations and leg swelling. Gastrointestinal:  Negative for abdominal pain, constipation, diarrhea, nausea and vomiting. Genitourinary:  Negative for decreased urine volume, difficulty urinating, dysuria, flank pain, frequency, hematuria and urgency. Musculoskeletal:  Positive for arthralgias, back pain and myalgias. Negative for gait problem, joint swelling, neck pain and neck stiffness. Skin:  Negative for color change, pallor, rash and wound. Neurological:  Negative for dizziness, tremors, seizures, syncope, facial asymmetry, speech difficulty, weakness, light-headedness, numbness and headaches. Positives and Pertinent negatives as per HPI. Except as noted above in the ROS, all other systems were reviewed and negative. PAST MEDICAL HISTORY     Past Medical History:   Diagnosis Date    Hepatitis C     History of heroin abuse ([facility] [address]     last used 2019         SURGICAL HISTORY     Past Surgical History:   Procedure Laterality Date     SECTION           CURRENTMEDICATIONS       Previous Medications    LAMOTRIGINE (LAMICTAL) 100 MG TABLET    Take 100 mg by mouth daily    METHADONE (METHADOSE) 40 MG DISINTEGRATING TABLET    Take 95 mg by mouth daily. ALLERGIES     Penicillins    FAMILYHISTORY     History reviewed. No pertinent family history.        SOCIAL HISTORY       Social History     Tobacco Use Smoking status: Every Day     Packs/day: 1.00     Types: Cigarettes    Smokeless tobacco: Never   Vaping Use    Vaping Use: Never used   Substance Use Topics    Alcohol use: Not Currently    Drug use: Yes     Types: Opiates      Comment: Fentnyl and crack       SCREENINGS    Sutter Coma Scale  Eye Opening: Spontaneous  Best Verbal Response: Oriented  Best Motor Response: Obeys commands  Sutter Coma Scale Score: 15        PHYSICAL EXAM    (up to 7 for level 4, 8 or more for level 5)     ED Triage Vitals [09/12/22 1606]   BP Temp Temp Source Heart Rate Resp SpO2 Height Weight   111/76 98.8 °F (37.1 °C) Oral (!) 106 (!) 32 96 % 5' (1.524 m) 105 lb (47.6 kg)       Physical Exam  Constitutional:       General: She is not in acute distress. Appearance: Normal appearance. She is well-developed. She is not ill-appearing, toxic-appearing or diaphoretic. HENT:      Head: Normocephalic and atraumatic. Comments: Atraumatic. No raccoon eyes or broderick sign     Right Ear: External ear normal.      Left Ear: External ear normal.   Eyes:      General:         Right eye: No discharge. Left eye: No discharge. Extraocular Movements: Extraocular movements intact. Conjunctiva/sclera: Conjunctivae normal.      Pupils: Pupils are equal, round, and reactive to light. Cardiovascular:      Rate and Rhythm: Regular rhythm. Tachycardia present. Pulses: Normal pulses. Heart sounds: Normal heart sounds. No murmur heard. No friction rub. No gallop. Pulmonary:      Effort: Pulmonary effort is normal. No respiratory distress. Breath sounds: Normal breath sounds. No stridor. No wheezing, rhonchi or rales. Comments: Tachypnea noted but no respiratory distress noted. Oxygen saturation 96% on room air  Chest:      Chest wall: No tenderness. Abdominal:      General: Abdomen is flat. Bowel sounds are normal. There is no distension. Palpations: Abdomen is soft. There is no mass. by [facility]   HCG, SERUM, QUALITATIVE    Narrative:     [facility]. [phone],  Rejected Test Name/Called to: [place] Staff, 09/12/2022 17:31, by [address] [facility],[address]    Narrative:     Michelle BAEZ tel. [phone],  Rejected Test Name/Called to: [place] Staff, 09/12/2022 17:31, by [facility]   ETHANOL   TROPONIN   MAGNESIUM   LACTATE, SEPSIS       When ordered only abnormal lab results are displayed. All other labs were within normal range or not returned as of this dictation. EKG: When ordered, EKG's are interpreted by the Emergency Department Physician in the absence of a cardiologist.  Please see their note for interpretation of EKG. RADIOLOGY:   Non-plain film images such as CT, Ultrasound and MRI are read by the radiologist. Plain radiographic images are visualized and preliminarily interpreted by the ED Provider with the below findings:        Interpretation per the Radiologist below, if available at the time of this note:    CT CHEST PULMONARY EMBOLISM W CONTRAST   Final Result   Negative examination for pulmonary embolism. Bibasilar dependent atelectasis. No focal infiltrates or pleural effusions. Emphysema and right apical subpleural bulla or blebs         CT CERVICAL SPINE WO CONTRAST   Final Result   No acute abnormality of the cervical spine. .      Multilevel degenerative disc disease and spurring         CT HEAD WO CONTRAST   Final Result   No acute intracranial abnormality. CT ABDOMEN PELVIS W IV CONTRAST Additional Contrast? None    (Results Pending)     CT HEAD WO CONTRAST    Result Date: 9/12/2022  EXAMINATION: CT OF THE HEAD WITHOUT CONTRAST  9/12/2022 7:28 pm TECHNIQUE: CT of the head was performed without the administration of intravenous contrast. Automated exposure control, iterative reconstruction, and/or weight based adjustment of the mA/kV was utilized to reduce the radiation dose to as low as reasonably achievable. COMPARISON: None. HISTORY: ORDERING SYSTEM PROVIDED HISTORY: fall TECHNOLOGIST PROVIDED HISTORY: Has a \"code stroke\" or \"stroke alert\" been called? ->No Reason for exam:->fall Decision Support Exception - unselect if not a suspected or confirmed emergency medical condition->Emergency Medical Condition (MA) Is the patient pregnant?->No Reason for Exam: fall FINDINGS: BRAIN/VENTRICLES: There is no acute intracranial hemorrhage, mass effect or midline shift. No abnormal extra-axial fluid collection. The gray-white differentiation is maintained without evidence of an acute infarct. There is no evidence of hydrocephalus. ORBITS: The visualized portion of the orbits demonstrate no acute abnormality. SINUSES: There is left maxillary sinus mucosal thickening SOFT TISSUES/SKULL:  No acute abnormality of the visualized skull or soft tissues. No acute intracranial abnormality. CT CERVICAL SPINE WO CONTRAST    Result Date: 9/12/2022  EXAMINATION: CT OF THE CERVICAL SPINE WITHOUT CONTRAST 9/12/2022 7:28 pm TECHNIQUE: CT of the cervical spine was performed without the administration of intravenous contrast. Multiplanar reformatted images are provided for review. Automated exposure control, iterative reconstruction, and/or weight based adjustment of the mA/kV was utilized to reduce the radiation dose to as low as reasonably achievable. COMPARISON: None. HISTORY: ORDERING SYSTEM PROVIDED HISTORY: fall TECHNOLOGIST PROVIDED HISTORY: Reason for exam:->fall Decision Support Exception - unselect if not a suspected or confirmed emergency medical condition->Emergency Medical Condition (MA) Is the patient pregnant?->No Reason for Exam: fall FINDINGS: BONES/ALIGNMENT: There is no acute fracture or traumatic malalignment. DEGENERATIVE CHANGES: There is intervertebral disc space narrowing at all levels extending from C3-C7. This is most pronounced at C5-6. There is endplate spurring. SOFT TISSUES: There is no prevertebral soft tissue swelling. No acute abnormality of the cervical spine. . Multilevel degenerative disc disease and spurring     CT CHEST PULMONARY EMBOLISM W CONTRAST    Result Date: 9/12/2022  EXAMINATION: CTA OF THE CHEST 9/12/2022 7:28 pm TECHNIQUE: CTA of the chest was performed after the administration of intravenous contrast.  Multiplanar reformatted images are provided for review. MIP images are provided for review. Automated exposure control, iterative reconstruction, and/or weight based adjustment of the mA/kV was utilized to reduce the radiation dose to as low as reasonably achievable. COMPARISON: None. HISTORY: ORDERING SYSTEM PROVIDED HISTORY: tachy - ivdu - sirs - septic emboli TECHNOLOGIST PROVIDED HISTORY: Reason for exam:->tachy - ivdu - sirs - septic emboli Decision Support Exception - unselect if not a suspected or confirmed emergency medical condition->Emergency Medical Condition (MA) Reason for Exam: tachy - ivdu - sirs - septic emboli FINDINGS: Pulmonary Arteries: Pulmonary arteries are adequately opacified for evaluation. No evidence of intraluminal filling defect to suggest pulmonary embolism. Main pulmonary artery is normal in caliber. Mediastinum: No evidence of mediastinal lymphadenopathy. The heart and pericardium demonstrate no acute abnormality. There is no acute abnormality of the thoracic aorta. Lungs/pleura: The airways are patent. There is dependent bibasilar atelectasis. There are no confluent infiltrates or pleural effusions. There are right apical subpleural bulla or blebs. There is mild emphysema. Upper Abdomen: Limited images of the upper abdomen are unremarkable. Soft Tissues/Bones: No acute bone or soft tissue abnormality. Negative examination for pulmonary embolism. Bibasilar dependent atelectasis. No focal infiltrates or pleural effusions.  Emphysema and right apical subpleural bulla or blebs           PROCEDURES   Unless otherwise noted below, none     Procedures    CRITICAL CARE TIME There was a high probability of life-threatening clinical deterioration in the patient's condition requiring my urgent intervention. I personally saw the patient and independently provided 32 minutes of non-concurrent critical care out of the total shared critical care time provided, excluding separately reportable procedures. CONSULTS:  None      EMERGENCY DEPARTMENT COURSE and DIFFERENTIAL DIAGNOSIS/MDM:   Vitals:    Vitals:    09/12/22 1606 09/12/22 1955   BP: 111/76 109/67   Pulse: (!) 106 77   Resp: (!) 32    Temp: 98.8 °F (37.1 °C)    TempSrc: Oral    SpO2: 96%    Weight: 105 lb (47.6 kg)    Height: 5' (1.524 m)        Patient was given the following medications:  Medications   0.9 % sodium chloride IV bolus 1,428 mL (1,428 mLs IntraVENous New Bag 9/12/22 2012)   vancomycin (VANCOCIN) 750 mg in dextrose 5 % 250 mL IVPB (has no administration in time range)   meropenem (MERREM) 1,000 mg in sodium chloride 0.9 % 100 mL IVPB (mini-bag) (has no administration in time range)   dextrose 10 % infusion (has no administration in time range)   midazolam PF (VERSED) injection 2 mg (2 mg IntraMUSCular Given 9/12/22 1709)   diphenhydrAMINE (BENADRYL) injection 50 mg (50 mg IntraMUSCular Given 9/12/22 1725)   haloperidol lactate (HALDOL) injection 5 mg (5 mg IntraMUSCular Given 9/12/22 1726)   iopamidol (ISOVUE-370) 76 % injection 75 mL (75 mLs IntraVENous Given 9/12/22 1931)         Is this patient to be included in the SEP-1 Core Measure due to severe sepsis or septic shock? Yes   SEP-1 CORE MEASURE DATA      Sepsis Criteria   Severe Sepsis Criteria   Septic Shock Criteria     Must be confirmed or suspected to move forward with diagnosis of sepsis. Must meet 2:    [] Temperature > 100.9 F (38.3 C)        or < 96.8 F (36 C)  [] HR > 90  [] RR > 20  [] WBC > 12 or < 4 or 10% bands      AND:      [] Infection Confirmed or        Suspected.      Must meet 1:    [] Lactate > 2       or   [] Signs of Organ obtained. Pregnancy was negative. Magnesium normal.  BNP 1114. Troponin was normal.  Procalcitonin 0.14. Ethanol negative. CMP showed low blood sugar and she was given some glucose through IV here in the emergency department. Urinalysis showed concern for acute cystitis with rare bacteria and 29 white blood cells. Does show fentanyl, cocaine and amphetamines on urine drug screen. Patient suffering from what appears to be polysubstance abuse with severe sepsis, fall and acute cystitis/cellulitis. Patient was ordered broad-spectrum antibiotics with vancomycin and meropenem here in the ED given allergies. EKG interpreted by attending. CT of the head and CT of the cervical spine showed no acute abnormality. CT of the abdomen pelvis showed irregularity of the SI joint which could be due to septic arthritis with pathologic fracture at this site. Body wall edema is noted. CT of the chest showed no evidence of pulmonary embolism. We patient benefit from mission for further evaluation treatment. Case discussed with hospital service for admission. FINAL IMPRESSION      1. Polysubstance abuse ([facility] [address]    2. Severe sepsis ([facility] [address]    3. Unwitnessed fall    4. Cellulitis of buttock    5. Acute cystitis without hematuria    6. Encephalopathy    7. Hypoglycemia          DISPOSITION/PLAN   DISPOSITION Decision To Admit 09/12/2022 08:06:30 PM      PATIENT REFERRED TO:  No follow-up provider specified.     DISCHARGE MEDICATIONS:  New Prescriptions    No medications on file       DISCONTINUED MEDICATIONS:  Discontinued Medications    No medications on file              (Please note that portions of this note were completed with a voice recognition program.  Efforts were made to edit the dictations but occasionally words are mis-transcribed.)    ZINA Shi (electronically signed)          ZINA Muro  09/12/22 2037

## 2022-09-13 NOTE — CONSULTS
Clinical Pharmacy Note: Pharmacy to Dose Vancomycin    Colby Moscoso is a 42 y.o. female started on Vancomycin for sepsis; consult received from Dr. Senait Gandhi to manage therapy. Also receiving the following antibiotics: meropenem. Goal AUC: 400-600 mg/L*hr  Goal Trough Level: 15-20 mcg/mL    Assessment/Plan:  A 750 mg loading dose was given on 9/12 at 2055  Initiate vancomycin 1250 mg IV every 12 hours. Bayesian modeling predicts an AUC of 591 mg/L*hr and a trough of 16.2 mcg/mL at steady state concentration. A vancomycin random level has been ordered for 9/14 at 0600  Changes in regimen will be determined based on culture results, renal function, and clinical response. Pharmacy will continue to monitor and adjust regimen as necessary. Allergies:  Penicillins     Recent Labs     09/12/22  1814 09/13/22  0719   CREATININE 0.5* <0.5*       Recent Labs     09/12/22  1702 09/13/22  0719   WBC 18.6* 10.7       Ht Readings from Last 1 Encounters:   09/13/22 5' (1.524 m)        Wt Readings from Last 1 Encounters:   09/13/22 107 lb 12.8 oz (48.9 kg)         Estimated Creatinine Clearance: 103 mL/min (based on SCr of 0.5 mg/dL).       Thank you for the consult,    Christina Dakin, PharmD, [facility]

## 2022-09-13 NOTE — PROGRESS NOTES
4 Eyes Skin Assessment     The patient is being assess for  Admission    I agree that 2 RN's have performed a thorough Head to Toe Skin Assessment on the patient. ALL assessment sites listed below have been assessed. Areas assessed by both nurses:   [x]   Head, Face, and Ears   [x]   Shoulders, Back, and Chest  [x]   Arms, Elbows, and Hands   [x]   Coccyx, Sacrum, and IschIum  [x]   Legs, Feet, and Heels        Does the Patient have Skin Breakdown?   No         Tom Prevention initiated:  Yes   Wound Care Orders initiated:  No      [facility] nurse consulted for Pressure Injury (Stage 3,4, Unstageable, DTI, NWPT, and Complex wounds), New and Established Ostomies:  No      Nurse 1 eSignature: Electronically signed by Marilou Gusman RN on 9/13/22 at 3:43 AM EDT    **SHARE this note so that the co-signing nurse is able to place an eSignature**    Nurse 2 eSignature: Electronically signed by Glynn Richardson RN on 9/13/22 at 4:18 AM EDT

## 2022-09-13 NOTE — PROGRESS NOTES
Pt lost PIV access. Attempted to start a new PIV with no success. PICC RN called and requested to start a new PIV. IV ABX on hold.

## 2022-09-13 NOTE — CARE COORDINATION
According to RN, Officer Davon Arguelles plans to take patient into custody at discharge for outstanding felony warrants. Please call [facility] [place] [address], [phone], [place] [phone].

## 2022-09-13 NOTE — CONSULTS
[facility] Orthopedic Surgery  Consult Note    Patient: Milton Irizarry  Admit Date: 2022  Requesting Physician: Ry Kraft DO  Room: [address] [place], [place] [id number]    Chief complaint: Left sided buttock pain    HPI: Milton Irizarry is a 42 y.o. female who presented to [place] [address] yesterday with IV drug abuse and apparent fall. She tells me she has had left side buttock pain for the last few weeks at least.  She cannot recall a traumatic event preceding the pain, but admits she may have had multiple falls recently. She denies paresthesias. She has been ambulating without assistive devices. Describes pain in the left buttock of moderate intensity and of sharp nature since a few weeks ago which is relieved by nothing. Denies new numbness/tingling. Imaging review of the pelvis via CT scan demonstrated: erosive changes/comminuted vertical fractures both sides of the LEFT SI joint concerning for pathologic fractures. Patient uses no assistive devices to ambulate.      + Smoker  Admits to IV fentanyl use along with cocaine  Denies being in rehab previously. PCT 0.41  WBC 18.6 on admission, now normalized  CRP 24, ESR 66  Afebrile    Medical History:  Past Medical History:   Diagnosis Date    Hepatitis C     History of heroin abuse ([facility] [address]     last used 2019     Past Surgical History:   Procedure Laterality Date     SECTION         Social History:    reports that she has been smoking cigarettes. She has been smoking an average of 1 pack per day. She has never used smokeless tobacco.    Family History:  History reviewed. No pertinent family history.     Medications:  ALL MEDICATIONS HAVE BEEN REVIEWED:  Scheduled:   vancomycin  1,250 mg IntraVENous Q12H    meropenem  1,000 mg IntraVENous Q8H    sodium chloride flush  10 mL IntraVENous 2 times per day    enoxaparin  30 mg SubCUTAneous Daily    potassium bicarb-citric acid  40 mEq Oral Once    lamoTRIgine  100 mg Oral Daily Continuous:   sodium chloride       PRN:traMADol, methocarbamol, dicyclomine, gabapentin, hydrOXYzine pamoate, promethazine, traZODone, sodium chloride flush, sodium chloride, magnesium sulfate, promethazine **OR** ondansetron, acetaminophen **OR** acetaminophen    Allergies: Allergies   Allergen Reactions    Penicillins Anaphylaxis       Review of Systems:  Constitutional: Negative for fever, chills, fatigue. Skin:  Negative for pruritis, rash  Eyes: Negative for photophobia and visual disturbance. ENT:  Negative for rhinorrhea, epistaxis, sore throat  Respiratory:  Negative for cough and shortness of breath. Cardiovascular: Negative for chest pain. Gastrointestinal: Negative for nausea, vomiting, diarrhea. Genitourinary: Negative for dysuria and difficulty urinating. Neurological: Negative for confusion, dysarthria, tremors, seizures. Psychiatric:  Negative for depression or anxiety  Musculoskeletal:  Positive for left buttock pain    Objective:  Vitals:    09/13/22 1115   BP: 128/71   Pulse: 82   Resp: 16   Temp: 98.1 °F (36.7 °C)   SpO2: 96%      Physical Examination:  GENERAL: No apparent distress, well-nourished  SKIN:  Warm and dry  EYES: Nonicteric. ENT: Mucous membranes moist  HEAD: Normocephalic, atraumatic  RESPIRATORY: Resp easy and unlabored  CARDIOVASCULAR: Regular rate and rhythm  GI: Abdomen soft, nontender  NEURO: Awake and alert. No speech defect  PSYCHIATRIC: Appropriate affect; not agitated  MUSCULOSKELETAL:  LEFT hip  Inspection: On exam there are no ulcerations, rashes or lesions about the left hip/pelvis. There is pain to palpation of the left SI joint. Perhaps mild warmth. No overlying erythema. She does have some SI pain with   Motor: Intact DF/PF bilaterally. Sensation: Grossly intact to light touch throughout the bilateral lower extremities. Vascular:  2+ bilateral DP pulses.     Labs reviewed:  Recent Labs     09/12/22  1702 09/13/22  0719   WBC 18.6* 10.7 HGB 11.5* 10.9*   HCT 34.9* 33.1*   * 493*     Recent Labs     09/12/22  1814 09/13/22  0719   * 137   K 3.2* 3.5   CL 96* 102   CO2 25 25   BUN 10 6*   CREATININE 0.5* <0.5*   GLUCOSE 67* 74   CALCIUM 9.1 8.9   MG 1.80 2.10     Recent Labs     09/12/22  1702   INR 1.12   PROTIME 14.3       Lab Results   Component Value Date    COLORU DARK YELLOW (A) 09/12/2022    CLARITYU CLOUDY (A) 09/12/2022    PHUR 6.5 09/12/2022    PHUR 6.5 09/12/2022    GLUCOSEU Negative 09/12/2022    BLOODU Negative 09/12/2022    LEUKOCYTESUR SMALL (A) 09/12/2022    BILIRUBINUR Negative 09/12/2022    UROBILINOGEN 1.0 09/12/2022    RBCUA 2 09/12/2022    WBCUA 29 (H) 09/12/2022    BACTERIA Rare (A) 09/12/2022       Imaging:  CT CHEST PULMONARY EMBOLISM W CONTRAST   Final Result   Negative examination for pulmonary embolism. Bibasilar dependent atelectasis. No focal infiltrates or pleural effusions. Emphysema and right apical subpleural bulla or blebs         CT ABDOMEN PELVIS W IV CONTRAST Additional Contrast? None   Final Result   1. Irregularity of the left SI joint with erosive change and fracture at both   the ileal and sacral aspects. This may reflect septic arthritis with   pathologic fractures at those sites. 2. Moderate body wall edema. CT CERVICAL SPINE WO CONTRAST   Final Result   No acute abnormality of the cervical spine. .      Multilevel degenerative disc disease and spurring         CT HEAD WO CONTRAST   Final Result   No acute intracranial abnormality.          MRI LUMBAR SPINE W WO CONTRAST    (Results Pending)   MRI PELVIS W WO CONTRAST    (Results Pending)       IMPRESSION:  LEFT pathologic/likely septic sacroiliac joint fracture  IVDA  Principal Problem:    Acute encephalopathy  Active Problems:    Polysubstance abuse (HCC)    Acute cystitis without hematuria    Cellulitis of buttock    Sacral fracture, closed (HCC)    Hypoglycemia    Severe sepsis ([facility] [address]    Unwitnessed fall    Pulmonary emphysema (HCC)    Mild bibasilar atelectasis    Amphetamine abuse (HCC)    Cocaine abuse (HCC)    Fentanyl use disorder, mild, abuse (HCC)    Screening for HIV (human immunodeficiency virus)    Chronic hepatitis C without hepatic coma (HCC)    Elevated C-reactive protein (CRP)    Elevated sed rate    Septic arthritis of sacroiliac joint ([address]    Fall    Bandemia  Resolved Problems:    * No resolved hospital problems. *      RECOMMENDATIONS:  Recommend IV abx per ID at this time. Ultimately, she will need to see [facility] to consider fixation of the SI joint. Given her history this is not perceived to be acute and she is hemodynamically stable and not currently septic, so this is not urgent. - Appears she is withdrawing, so can stabilize her prior to further consideration regarding transfer. - 50% WB with walker/crutches  - Pain control  - DVT prophylaxis: Lovenox per primary team  - Recreational drug use cessation  - Dispo: uncertain    Patient has been counseled about the detrimental effects of smoking and the need to eliminate or significantly decrease their tobacco use. I would suggest discussing this issue with their medical doctor. I would also highly recommend the immediate cessation of all forms of tobacco use. I have reviewed imaging and plan with Dr. Girma Steen.       Rafiq Kitchen, FEMI - CNP  9/13/2022  12:26 PM

## 2022-09-13 NOTE — PROGRESS NOTES
Patient admission and neuro checks not completed due to patient been lethargic.  Will continue to monitor

## 2022-09-13 NOTE — CONSULTS
Infectious Diseases   Consult Note        Admission Date: 9/12/2022  Hospital Day: Hospital Day: 2   Attending: Tom Cuello MD  Date of service: 9/13/22     Reason for admission: Cellulitis of buttock [L03.317]  Hypoglycemia [E16.2]  Encephalopathy [G93.40]  Polysubstance abuse ([facility] [address] [F19.10]  Acute cystitis without hematuria [N30.00]  Acute encephalopathy [G93.40]  Severe sepsis ([address] [A41.9, R65.20]  Closed fracture of sacrum, unspecified portion of sacrum, initial encounter ([facility] [address] [S32.10XA]  Unwitnessed fall [R29.6]    Chief complaint/ Reason for consult: Severe sepsis, concern for left sacroiliac joint septic arthritis    Microbiology:        I have reviewed allavailable micro lab data and cultures    Blood culture (2/2) - collected on 9/12/2022: In process      Antibiotics and immunizations:       Current antibiotics: All antibiotics and their doses were reviewed by me    Recent Abx Admin                     meropenem (MERREM) 1,000 mg in sodium chloride 0.9 % 100 mL IVPB (mini-bag) (mg) 1,000 mg New Bag 09/13/22 1040    vancomycin (VANCOCIN) 750 mg in dextrose 5 % 250 mL IVPB (mg) 750 mg New Bag 09/12/22 2055    meropenem (MERREM) 1,000 mg in sodium chloride 0.9 % 100 mL IVPB (mini-bag) (mg) 1,000 mg New Bag 09/12/22 2018                      Immunization History: All immunization history was reviewed by me today. Immunization History   Administered Date(s) Administered    COVID-19, J&J, (age 18y+), IM, 0.5 mL 07/22/2021       Known drug allergies: All allergies were reviewed and updated    Allergies   Allergen Reactions    Penicillins Anaphylaxis       Social history:     Social History:  All social andepidemiologic history was reviewed and updated by me today as needed. Tobacco use:   reports that she has been smoking cigarettes. She has been smoking an average of 1 pack per day. She has never used smokeless tobacco.  Alcohol use:   reports that she does not currently use alcohol.   Currently lives in: [place]   reports current drug use. Drug: Opiates . COVID VACCINATION AND LAB RESULT RECORDS:     Internal Administration   First Dose COVID-19, J&J, (age 18y+), IM, 0.5 mL  07/22/2021   Second Dose           Last COVID Lab No results found for: SARS-COV-2, SARS-COV-2 RNA, SARS-COV-2, SARS-COV-2, SARS-COV-2 BY PCR, SARS-COV-2, SARS-COV-2, SARS-COV-2         Assessment:     The patient is a 42 y.o. old female who  has a past medical history of Hepatitis C and History of heroin abuse ([facility] [address] with following problems:    Severe sepsis on admission with tachypnea, tachycardia, bandemia and acute metabolic encephalopathy on admission  Fall before admission leading to bilateral ileal and sacral aspect fractures of the left sacroiliac joint  Concern for pathologic fractures and left sacroiliac joint septic arthritis on CT imaging  Elevated sed rate and CRP  Bibasal atelectasis  Chronic emphysema  IV drug user  Amphetamine and heroin abuse  Fentanyl abuse  Cocaine abuse  Chronic hepatitis C  Need for HIV screen      Discussion:      The patient is an IV drug user who has been admitted after a fall in the parking lot. She had a CT scan of head and cervical spine without contrast done in the ER. She also had a CT angiogram of the chest and a CT scan of abdomen and pelvis with IV contrast done in the ER. I reviewed all CT images and independently interpreted the findings. There was no evidence for any pulmonary embolism. There were no acute intracranial or cervical spine abnormalities. The patient has fractures of the ileal and sacral aspect of left sacroiliac joint. Radiology is concerned about possibility of septic arthritis and pathological fractures of the sites. Patient's white cell count was elevated at 18,600 on admission. A procalcitonin was 0.41. CRP was elevated 24. 4.  proBNP was elevated at 1114 and sed rate was elevated at 66.     Urine drug screen was positive for amphetamines, cocaine and fentanyl      Plan:     Diagnostic Workup:    Agree with blood cultures  If blood cultures remain negative, will recommend CT-guided aspiration of the affected SI joint area by interventional radiology for bacterial, fungal and AFB cultures and cytology  Will order baseline HIV screen  Will order hepatitis B surface antibody and hepatitis C quantitative PCR for thoroughness  Continue to follow fever curve, WBC count and blood cultures  Follow up on liverand renal functions closely    Antimicrobials:    Agree with empiric IV vancomycin for empiric gram-positive coverage including MRSA coverage  Check Vancomycin trough before the 5th dose. Target vancomycin trough level of 15-20  mg/L or vancomycin area under curve (AUC) of 400-600 mg*h/L by Bayesian modeling method. If dosing vancomycin based on trough levels, keep vancomycin trough below 20 at all times. Avoid increasing the dose of vancomycin above a total of 4 grams in a 24-hour period in patients younger than 45 years and above 3 grams in a 24-hour period in a patient of age 39 years or older. Continue to monitor serum creatinine and Vanco levels closely, while the patient is on I/v Vancomycin. Okay to order IV meropenem 1 g every 8 hour for empiric gram-negative and anaerobic coverage  Recommend holding off on PICC line until blood cultures clear for at least 48 and preferably 72 hours. Midline or regular central venous line okay for now, if needed for IV access. We will follow up on the culture results and clinical progress and will make further recommendations accordingly. Continue close vitals monitoring. Maintain good glycemic control. Fall precautions. Aspiration precautions. Continue to watch for new fever or diarrhea. DVT prophylaxis. Discussed all above with patient and RN.       Drug Monitoring:    Continue serial monitoring for antibiotic toxicity as follows: Vancomycin trough, CBC, CMP  Continue to watch for following: new or worsening fever, hypotension, hives, lip swelling and redness or purulence at vascular access sites. I/v access Management:    Continue to monitor i.v access sites for erythema, induration, discharge or tenderness. As always, continue efforts to minimizetubes/lines/drains as clinically appropriate to reduce chances of line associated infections. Current isolation precautions: There are no current isolations documented for this patient. Level of complexity of visit and medical decision making: High     Risk of Complications/Morbidity: High     Illness(es)/ Infection present that pose threat to life/bodily function. There is potential for severe exacerbation of infection/side effects of treatment. Therapy requires intensive monitoring for antimicrobial agent toxicity. Thank you for involving me in the care of your patient. I will continue to follow. If you have any additional questions, please do not hesitate to contact me. Subjective:     Presenting complaint in ER:     Chief Complaint   Patient presents with    Fall     From parking lot via EMS with police accompanying with cc fall with injury to left lower back/hip/buttocks. Pt endorses fentnyl and crack-cocaine use. HPI: Karlos Denver is a 42 y.o. female patient, who was seen at the request of Dr. Jose Rodgers MD.    History was obtained from chart review and the patient. The patient was admitted on 9/12/2022. I have been consulted to see the patient for above mentioned reason(s). The patient has multiple medical comorbidities, and presented to the ER after a fall in which she injured her left lower back and hip and buttock areas. The patient has a history of IV drug use and uses illicit fentanyl and crack cocaine and heroin. She fell apparently in a parking lot and EMS was called.   The patient arrived in police custody via EMS to the ER as she was disoriented and yelling and aggressive to the EMS reportedly    The patient was having pain in the buttock areas that started after a fall. It was constant and sharp in nature which was aggravated by movement with no relieving factors. In the ER, patient had elevated white cell count of 18,600. He was hospitalized. Blood cultures were sent. I have been asked for my opinion for management for this patient. Past Medical History: All past medical history reviewed today. Past Medical History:   Diagnosis Date    Hepatitis C     History of heroin abuse ([facility] [address]     last used 2019         Past Surgical History: All pastsurgical history was reviewed today. Past Surgical History:   Procedure Laterality Date     SECTION           Family History: All family history was reviewed today. History reviewed. No pertinent family history. Medications: All current and past medications were reviewed. Medications Prior to Admission: methadone (METHADOSE) 40 MG disintegrating tablet, Take 95 mg by mouth daily. (Patient not taking: Reported on 2022)  lamoTRIgine (LAMICTAL) 100 MG tablet, Take 100 mg by mouth daily (Patient not taking: Reported on 2022)     vancomycin  1,250 mg IntraVENous Q12H    meropenem  1,000 mg IntraVENous Q8H    sodium chloride flush  10 mL IntraVENous 2 times per day    enoxaparin  30 mg SubCUTAneous Daily    potassium bicarb-citric acid  40 mEq Oral Once    lamoTRIgine  100 mg Oral Daily          REVIEW OF SYSTEMS:       Review of Systems   Constitutional:  Positive for fatigue. Negative for chills, diaphoresis and fever. HENT:  Negative for ear discharge, ear pain, postnasal drip, rhinorrhea, sinus pressure, sinus pain and sore throat. Eyes:  Negative for discharge and redness. Respiratory:  Negative for cough, shortness of breath and wheezing. Cardiovascular:  Negative for chest pain and leg swelling.    Gastrointestinal:  Negative for abdominal pain, constipation, diarrhea and nausea. Endocrine: Negative for cold intolerance, heat intolerance and polydipsia. Genitourinary:  Negative for dysuria, flank pain, frequency, hematuria and urgency. Musculoskeletal:  Positive for arthralgias. Negative for back pain and myalgias. Skin:  Negative for rash. Allergic/Immunologic: Negative for immunocompromised state. Neurological:  Negative for dizziness, seizures and headaches. Hematological:  Does not bruise/bleed easily. Psychiatric/Behavioral:  Negative for agitation, hallucinations and suicidal ideas. The patient is not nervous/anxious. All other systems reviewed and are negative. Objective:       PHYSICAL EXAM:      Vitals:   Vitals:    09/13/22 0315 09/13/22 0329 09/13/22 0725 09/13/22 1115   BP:  103/63 109/65 128/71   Pulse:  84 90 82   Resp:  16 16 16   Temp:  97.9 °F (36.6 °C) 98.4 °F (36.9 °C) 98.1 °F (36.7 °C)   TempSrc:  Axillary Axillary Oral   SpO2:  97% 96% 96%   Weight: 107 lb 12.8 oz (48.9 kg)      Height: 5' (1.524 m)          Physical Exam  Vitals and nursing note reviewed. Constitutional:       Appearance: She is well-developed. She is not diaphoretic. Comments: The patient was seen earlier today. HENT:      Head: Normocephalic and atraumatic. Right Ear: External ear normal. There is no impacted cerumen. Left Ear: External ear normal. There is no impacted cerumen. Nose: Nose normal.      Mouth/Throat:      Mouth: Mucous membranes are moist.      Pharynx: Oropharynx is clear. No oropharyngeal exudate. Eyes:      General: No scleral icterus. Right eye: No discharge. Left eye: No discharge. Conjunctiva/sclera: Conjunctivae normal.      Pupils: Pupils are equal, round, and reactive to light. Neck:      Thyroid: No thyromegaly. Cardiovascular:      Rate and Rhythm: Normal rate and regular rhythm. Heart sounds: Normal heart sounds. No murmur heard. No friction rub.    Pulmonary:      Effort: No respiratory distress. Breath sounds: No stridor. No wheezing or rales. Abdominal:      General: Bowel sounds are normal.      Palpations: Abdomen is soft. Tenderness: There is no abdominal tenderness. There is no guarding or rebound. Musculoskeletal:         General: Tenderness (Left sacroiliac joint area) present. No swelling or deformity. Normal range of motion. Cervical back: Normal range of motion and neck supple. Right lower leg: No edema. Left lower leg: No edema. Lymphadenopathy:      Cervical: No cervical adenopathy. Skin:     General: Skin is warm and dry. Coloration: Skin is not jaundiced. Findings: No bruising, erythema or rash. Neurological:      General: No focal deficit present. Mental Status: She is alert and oriented to person, place, and time. Mental status is at baseline. Motor: No abnormal muscle tone. Psychiatric:         Mood and Affect: Mood normal.         Behavior: Behavior normal.         Lines and drains: All vascular access sites are healthy with no local erythema, discharge or tenderness. Intake and output:     No intake/output data recorded. Lab Data:   All available labs were reviewed by me today.      CBC:   Recent Labs     09/12/22  1702 09/13/22  0719   WBC 18.6* 10.7   RBC 4.06 3.85*   HGB 11.5* 10.9*   HCT 34.9* 33.1*   * 493*   MCV 86.0 86.0   MCH 28.3 28.4   MCHC 32.9 33.0   RDW 16.4* 16.5*        BMP:  Recent Labs     09/12/22  1814 09/13/22  0719   * 137   K 3.2* 3.5   CL 96* 102   CO2 25 25   BUN 10 6*   CREATININE 0.5* <0.5*   CALCIUM 9.1 8.9   GLUCOSE 67* 74        Hepatic FunctionPanel:   Lab Results   Component Value Date/Time    ALKPHOS 77 09/13/2022 07:19 AM    ALT 22 09/13/2022 07:19 AM    AST 45 09/13/2022 07:19 AM    PROT 6.5 09/13/2022 07:19 AM    BILITOT 0.5 09/13/2022 07:19 AM    LABALBU 2.5 09/13/2022 07:19 AM       CPK: No results found for: CKTOTAL  ESR:   Lab Results   Component Value verbal consent from the patient and with their approval to include those in the patient's medical record. Verna Haas MD, MPH, [facility]ado  9/13/2022, 11:27 AM  [facility] Infectious Disease   [facility] [facility] [address]  Office: [phone]  Fax: [phone]  In-person Clinic days:  Tuesday & Thursday a.m. Virtual clinic days: Monday, Wednesday & Friday a.m.

## 2022-09-13 NOTE — PROGRESS NOTES
Hospitalist Progress Note      PCP: No primary care provider on file. Date of Admission: 9/12/2022    Chief Complaint:  [facility] Course: 43 yo F with Hep C, h/o cocaine abuse, fentanyl abuse, amphetamine abuse was brought to ER for AMS. Admitted as inpatient for acute metabolic encephalopathy, L sacroiliitis, sacral and ileal fractures and leukocytosis. Followed by ID and Ortho. For MRI Pelvis and L spine. Started on IV Abx. Fleet enema ordered for fecal impaction. Subjective:  Patient with back pain. No CP, SOB, HA or abdominal pain. Medications:  Reviewed    Infusion Medications    sodium chloride       Scheduled Medications    vancomycin  1,250 mg IntraVENous Q12H    meropenem  1,000 mg IntraVENous Q8H    sodium chloride flush  10 mL IntraVENous 2 times per day    enoxaparin  30 mg SubCUTAneous Daily    potassium bicarb-citric acid  40 mEq Oral Once    lamoTRIgine  100 mg Oral Daily     PRN Meds: traMADol, methocarbamol, dicyclomine, gabapentin, hydrOXYzine pamoate, promethazine, traZODone, sodium chloride flush, sodium chloride, magnesium sulfate, promethazine **OR** ondansetron, acetaminophen **OR** acetaminophen      Intake/Output Summary (Last 24 hours) at 9/13/2022 1701  Last data filed at 9/13/2022 1245  Gross per 24 hour   Intake 464.57 ml   Output --   Net 464.57 ml       Physical Exam Performed:    /71   Pulse 82   Temp 98.1 °F (36.7 °C) (Oral)   Resp 16   Ht 5' (1.524 m)   Wt 107 lb 12.8 oz (48.9 kg)   LMP  (LMP Unknown)   SpO2 96%   BMI 21.05 kg/m²     General appearance: No apparent distress, appears stated age and cooperative. HEENT: Pupils equal, round, and reactive to light. Conjunctivae/corneas clear. Neck: Supple, with full range of motion. No jugular venous distention. Trachea midline. Respiratory:  Normal respiratory effort. Clear to auscultation, bilaterally without Rales/Wheezes/Rhonchi.   Cardiovascular: Regular rate and rhythm with normal S1/S2 without murmurs, rubs or gallops. Abdomen: Soft, non-tender, non-distended with normal bowel sounds. Musculoskeletal: Tender L sacroiliac area. Skin: Skin color, texture, turgor normal.  No rashes or lesions. Neurologic:  Neurovascularly intact without any focal sensory/motor deficits. Cranial nerves: II-XII intact, grossly non-focal.  Psychiatric: Alert and oriented, thought content appropriate, normal insight  Capillary Refill: Brisk, 3 seconds, normal   Peripheral Pulses: +2 palpable, equal bilaterally       Labs:   Recent Labs     09/12/22 1702 09/13/22  0719   WBC 18.6* 10.7   HGB 11.5* 10.9*   HCT 34.9* 33.1*   * 493*     Recent Labs     09/12/22  1814 09/13/22  0719   * 137   K 3.2* 3.5   CL 96* 102   CO2 25 25   BUN 10 6*   CREATININE 0.5* <0.5*   CALCIUM 9.1 8.9     Recent Labs     09/12/22  1814 09/13/22  0719   AST 46* 45*   ALT 25 22   BILITOT 0.5 0.5   ALKPHOS 85 77     Recent Labs     09/12/22 1702   INR 1.12     Recent Labs     09/12/22 1814   TROPONINI <0.01       Urinalysis:      Lab Results   Component Value Date/Time    NITRU Negative 09/12/2022 06:31 PM    WBCUA 29 09/12/2022 06:31 PM    BACTERIA Rare 09/12/2022 06:31 PM    RBCUA 2 09/12/2022 06:31 PM    BLOODU Negative 09/12/2022 06:31 PM    SPECGRAV 1.020 09/12/2022 06:31 PM    GLUCOSEU Negative 09/12/2022 06:31 PM       Radiology:  CT CHEST PULMONARY EMBOLISM W CONTRAST   Final Result   Negative examination for pulmonary embolism. Bibasilar dependent atelectasis. No focal infiltrates or pleural effusions. Emphysema and right apical subpleural bulla or blebs         CT ABDOMEN PELVIS W IV CONTRAST Additional Contrast? None   Final Result   1. Irregularity of the left SI joint with erosive change and fracture at both   the ileal and sacral aspects. This may reflect septic arthritis with   pathologic fractures at those sites. 2. Moderate body wall edema.          CT CERVICAL SPINE WO CONTRAST   Final Result No acute abnormality of the cervical spine. .      Multilevel degenerative disc disease and spurring         CT HEAD WO CONTRAST   Final Result   No acute intracranial abnormality. MRI LUMBAR SPINE W WO CONTRAST    (Results Pending)   MRI PELVIS W WO CONTRAST    (Results Pending)           Assessment/Plan:    Active Hospital Problems    Diagnosis     Polysubstance abuse ([address] [F19.10]      Priority: Medium    Acute cystitis without hematuria [N30.00]      Priority: Medium    Cellulitis of buttock [Q00.713]      Priority: Medium    Sacral fracture, closed ([address] [S32.10XA]      Priority: Medium    Hypoglycemia [E16.2]      Priority: Medium    Severe sepsis ([address] [A41.9, R65.20]      Priority: Medium    Unwitnessed fall [R29.6]      Priority: Medium    Pulmonary emphysema ([address] [J43.9]      Priority: Medium    Mild bibasilar atelectasis [J98.11]      Priority: Medium    Amphetamine abuse ([address] [F15.10]      Priority: Medium    Cocaine abuse ([address] [F14.10]      Priority: Medium    Fentanyl use disorder, mild, abuse ([address] [F11.10]      Priority: Medium    Screening for HIV (human immunodeficiency virus) [Z11.4]      Priority: Medium    Chronic hepatitis C without hepatic coma ([address] [B18.2]      Priority: Medium    Elevated C-reactive protein (CRP) [R79.82]      Priority: Medium    Elevated sed rate [R70.0]      Priority: Medium    Septic arthritis of sacroiliac joint ([address] [M46.58]      Priority: Medium    Fall [W19. XXXA]      Priority: Medium    Bandemia [D72.825]      Priority: Medium    Acute encephalopathy [G93.40]      Priority: Medium     Continue IV Merrem and Vanco  Check Echo to r/o endocarditis  Check MRI Pelvis with contrast  Check MRI L spine with contrast  Check ESR and CRP  COWS protocol  DO NOT CALL FOR NARCOTICS   PT/OT eval  Replete K  Counseled on drug cessation  Fleet enema for fecal impaction    DVT Prophylaxis: Lovenox  Diet: ADULT DIET; Regular; 3 carb choices (45 gm/meal);  Low Fat/Low Chol/High Fiber/2 gm Na  Code Status: Full Code  PT/OT Eval Status: Requested    Dispo - detention    Discussed with patient, Bj Valdez (Ortho NP), nursing and CM. Surgical intervention (if needed) will need to be at [facility] per Ortho. Not a candidate for IV Abx.     Angie Dasilva MD

## 2022-09-13 NOTE — ACP (ADVANCE CARE PLANNING)
Advanced Care Planning Note. Purpose of Encounter: Advanced care planning in light of acute metabolic encephalopathy  Parties In Attendance: Patient  Decisional Capacity: Yes  Subjective: Patient with back pain  Objective: Cr < 0.5  Goals of Care Determination: Patient wants full support (CPR, vent, surgery, HD, trach, PEG)  Plan:  IV Abx. MRI Pelvis and L spine. Ortho and ID consults  Code Status: Full code   Time spent on Advanced care Plannin minutes  Advanced Care Planning Documents: Completed advanced directives on chart, boyfriend is the POA.     Jennifer Oconnor MD  2022 5:10 PM

## 2022-09-13 NOTE — PROGRESS NOTES
Patient admitted to [phone]. Patient only respond to pain, vital signs WNL, Sat 97%, respirations easy and unlabored. Call light within reached. Bed alarm engaged. Video monitoring in place.  Will continue to monitor

## 2022-09-13 NOTE — H&P
current illness    History reviewed. No pertinent family history. REVIEW OF SYSTEMS:     Patient encephalopathic  PHYSICAL EXAM PERFORMED:    /63   Pulse 84   Temp 97.9 °F (36.6 °C) (Axillary)   Resp 16   Ht 5' (1.524 m)   Wt 107 lb 12.8 oz (48.9 kg)   LMP  (LMP Unknown)   SpO2 97%   BMI 21.05 kg/m²     General appearance:  mild acute distress, appears older than stated age  HEENT:   atraumatic, sclera anicteric, Conjunctivae clear. Neck: Supple,Trachea midline, no goiter  Respiratory:minimal accessory muscle usage, Normal respiratory effort. Clear to auscultation, bilaterally without wheezing  Cardiovascular:  Regular rate and rhythm, capillary refill 2 seconds  Abdomen: Soft, non-tender, non-distended with normal bowel sounds. Musculoskeletal:  No clubbing, cyanosis. trace edema LE bilaterally. Skin: turgor normal.  No new rashes or lesions. Track marks noted  Neurologic: Alert and oriented x1, does not participate on exam  Labs:     Recent Labs     09/12/22  1702   WBC 18.6*   HGB 11.5*   HCT 34.9*   *     Recent Labs     09/12/22  1814   *   K 3.2*   CL 96*   CO2 25   BUN 10   CREATININE 0.5*   CALCIUM 9.1     Recent Labs     09/12/22  1814   AST 46*   ALT 25   BILITOT 0.5   ALKPHOS 85     Recent Labs     09/12/22  1702   INR 1.12     Recent Labs     09/12/22  1814   TROPONINI <0.01       Urinalysis:      Lab Results   Component Value Date/Time    NITRU Negative 09/12/2022 06:31 PM    WBCUA 29 09/12/2022 06:31 PM    BACTERIA Rare 09/12/2022 06:31 PM    RBCUA 2 09/12/2022 06:31 PM    BLOODU Negative 09/12/2022 06:31 PM    SPECGRAV 1.020 09/12/2022 06:31 PM    GLUCOSEU Negative 09/12/2022 06:31 PM       Radiology:       EKG:  I have reviewed the EKG with the following interpretation:   Normal sinus rhythm  CT CHEST PULMONARY EMBOLISM W CONTRAST   Final Result   Negative examination for pulmonary embolism. Bibasilar dependent atelectasis.   No focal infiltrates or pleural effusions. Emphysema and right apical subpleural bulla or blebs         CT ABDOMEN PELVIS W IV CONTRAST Additional Contrast? None   Final Result   1. Irregularity of the left SI joint with erosive change and fracture at both   the ileal and sacral aspects. This may reflect septic arthritis with   pathologic fractures at those sites. 2. Moderate body wall edema. CT CERVICAL SPINE WO CONTRAST   Final Result   No acute abnormality of the cervical spine. .      Multilevel degenerative disc disease and spurring         CT HEAD WO CONTRAST   Final Result   No acute intracranial abnormality. ASSESSMENT AND PLAN:    Active Hospital Problems    Diagnosis Date Noted    Acute encephalopathy [G93.40] 09/12/2022     Priority: Medium     Acute encephalopathy:  Suspected toxic secondary to drug abuse  CT head negative  Labs reviewed  Neurochecks every 4    Sepsis: Tachycardia, leukocytosis on admission  Etiology suspected UTI  Given IV drug use concern for endocarditis  Blood cultures x3, echocardiogram pending  IV meropenem and vancomycin    Drug abuse:  Once returns to conscious will need education and counseling    Hypokalemia: Replaced    Left SI joint erosive changes and fractures:  Orthopedic consulted  MRI pelvis ordered    Moderate bowel wall edema: No peritoneal signs, CT ruled out emergencies    Diet: NPO except meds ordered    DVT Prophylaxis: lovenox    Dispo:   Expected LOS greater than two [address] [facility], [place]

## 2022-09-13 NOTE — PLAN OF CARE
Problem: Discharge Planning  Goal: Discharge to home or other facility with appropriate resources  9/13/2022 1209 by Abraham Conner RN  Outcome: Progressing     Problem: Pain  Goal: Verbalizes/displays adequate comfort level or baseline comfort level  9/13/2022 1209 by Abraham Conner RN  Outcome: Progressing     Problem: Skin/Tissue Integrity  Goal: Absence of new skin breakdown  Description: 1. Monitor for areas of redness and/or skin breakdown  2. Assess vascular access sites hourly  3. Every 4-6 hours minimum:  Change oxygen saturation probe site  4. Every 4-6 hours:  If on nasal continuous positive airway pressure, respiratory therapy assess nares and determine need for appliance change or resting period.   9/13/2022 1209 by Abraham Conner RN  Outcome: Progressing     Problem: Safety - Adult  Goal: Free from fall injury  9/13/2022 1209 by Abraham Conner RN  Outcome: Progressing     Problem: ABCDS Injury Assessment  Goal: Absence of physical injury  Outcome: Progressing

## 2022-09-13 NOTE — PROGRESS NOTES
Patient potassium level 3.2. Patient too lethargic to take oral medicine. Hosp notified. Order for IV potassium replacement.  Will continue to monitor

## 2022-09-14 PROBLEM — Z71.51 DRUG ABUSE COUNSELING AND SURVEILLANCE OF DRUG ABUSER: Status: ACTIVE | Noted: 2022-09-13

## 2022-09-14 PROBLEM — Z71.6 TOBACCO ABUSE COUNSELING: Status: ACTIVE | Noted: 2022-09-14

## 2022-09-14 LAB
A/G RATIO: 0.6 (ref 1.1–2.2)
ALBUMIN SERPL-MCNC: 2.2 G/DL (ref 3.4–5)
ALP BLD-CCNC: 72 U/L (ref 40–129)
ALT SERPL-CCNC: 21 U/L (ref 10–40)
ANION GAP SERPL CALCULATED.3IONS-SCNC: 11 MMOL/L (ref 3–16)
AST SERPL-CCNC: 49 U/L (ref 15–37)
BASOPHILS ABSOLUTE: 0.1 K/UL (ref 0–0.2)
BASOPHILS RELATIVE PERCENT: 0.6 %
BILIRUB SERPL-MCNC: 0.4 MG/DL (ref 0–1)
BUN BLDV-MCNC: 7 MG/DL (ref 7–20)
CALCIUM SERPL-MCNC: 8.4 MG/DL (ref 8.3–10.6)
CHLORIDE BLD-SCNC: 103 MMOL/L (ref 99–110)
CO2: 22 MMOL/L (ref 21–32)
CREAT SERPL-MCNC: <0.5 MG/DL (ref 0.6–1.1)
EOSINOPHILS ABSOLUTE: 0.1 K/UL (ref 0–0.6)
EOSINOPHILS RELATIVE PERCENT: 0.7 %
GFR AFRICAN AMERICAN: >60
GFR NON-AFRICAN AMERICAN: >60
GLUCOSE BLD-MCNC: 98 MG/DL (ref 70–99)
HCT VFR BLD CALC: 30.6 % (ref 36–48)
HEMOGLOBIN: 10.1 G/DL (ref 12–16)
HIV AG/AB: NORMAL
HIV ANTIGEN: NORMAL
HIV-1 ANTIBODY: NORMAL
HIV-2 AB: NORMAL
LAMOTRIGINE LEVEL: <0.9 UG/ML (ref 3–15)
LYMPHOCYTES ABSOLUTE: 1.7 K/UL (ref 1–5.1)
LYMPHOCYTES RELATIVE PERCENT: 16.8 %
MAGNESIUM: 2.1 MG/DL (ref 1.8–2.4)
MCH RBC QN AUTO: 28.2 PG (ref 26–34)
MCHC RBC AUTO-ENTMCNC: 32.9 G/DL (ref 31–36)
MCV RBC AUTO: 85.8 FL (ref 80–100)
MONOCYTES ABSOLUTE: 0.5 K/UL (ref 0–1.3)
MONOCYTES RELATIVE PERCENT: 5.6 %
NEUTROPHILS ABSOLUTE: 7.5 K/UL (ref 1.7–7.7)
NEUTROPHILS RELATIVE PERCENT: 76.3 %
PDW BLD-RTO: 16.6 % (ref 12.4–15.4)
PLATELET # BLD: 492 K/UL (ref 135–450)
PMV BLD AUTO: 6.5 FL (ref 5–10.5)
POTASSIUM REFLEX MAGNESIUM: 3.4 MMOL/L (ref 3.5–5.1)
RBC # BLD: 3.56 M/UL (ref 4–5.2)
SODIUM BLD-SCNC: 136 MMOL/L (ref 136–145)
TOTAL PROTEIN: 6.1 G/DL (ref 6.4–8.2)
VANCOMYCIN RANDOM: 10.5 UG/ML
WBC # BLD: 9.9 K/UL (ref 4–11)

## 2022-09-14 PROCEDURE — 36415 COLL VENOUS BLD VENIPUNCTURE: CPT

## 2022-09-14 PROCEDURE — 6360000002 HC RX W HCPCS: Performed by: INTERNAL MEDICINE

## 2022-09-14 PROCEDURE — 2580000003 HC RX 258: Performed by: INTERNAL MEDICINE

## 2022-09-14 PROCEDURE — 85025 COMPLETE CBC W/AUTO DIFF WBC: CPT

## 2022-09-14 PROCEDURE — 2060000000 HC ICU INTERMEDIATE R&B

## 2022-09-14 PROCEDURE — 83735 ASSAY OF MAGNESIUM: CPT

## 2022-09-14 PROCEDURE — 6370000000 HC RX 637 (ALT 250 FOR IP): Performed by: INTERNAL MEDICINE

## 2022-09-14 PROCEDURE — 99233 SBSQ HOSP IP/OBS HIGH 50: CPT | Performed by: INTERNAL MEDICINE

## 2022-09-14 PROCEDURE — 80053 COMPREHEN METABOLIC PANEL: CPT

## 2022-09-14 PROCEDURE — 80202 ASSAY OF VANCOMYCIN: CPT

## 2022-09-14 RX ADMIN — MEROPENEM 1000 MG: 1 INJECTION, POWDER, FOR SOLUTION INTRAVENOUS at 03:14

## 2022-09-14 RX ADMIN — Medication 10 ML: at 20:55

## 2022-09-14 RX ADMIN — TRAMADOL HYDROCHLORIDE 50 MG: 50 TABLET ORAL at 21:03

## 2022-09-14 RX ADMIN — VANCOMYCIN HYDROCHLORIDE 1250 MG: 10 INJECTION, POWDER, LYOPHILIZED, FOR SOLUTION INTRAVENOUS at 06:58

## 2022-09-14 RX ADMIN — TRAMADOL HYDROCHLORIDE 50 MG: 50 TABLET ORAL at 04:48

## 2022-09-14 RX ADMIN — MEROPENEM 1000 MG: 1 INJECTION, POWDER, FOR SOLUTION INTRAVENOUS at 11:15

## 2022-09-14 RX ADMIN — LAMOTRIGINE 100 MG: 100 TABLET ORAL at 10:05

## 2022-09-14 RX ADMIN — Medication 10 ML: at 10:05

## 2022-09-14 RX ADMIN — TRAZODONE HYDROCHLORIDE 50 MG: 50 TABLET ORAL at 21:03

## 2022-09-14 RX ADMIN — METHOCARBAMOL TABLETS 750 MG: 500 TABLET, COATED ORAL at 17:00

## 2022-09-14 RX ADMIN — TRAMADOL HYDROCHLORIDE 50 MG: 50 TABLET ORAL at 17:00

## 2022-09-14 ASSESSMENT — ENCOUNTER SYMPTOMS
RHINORRHEA: 0
ABDOMINAL PAIN: 0
SINUS PAIN: 0
EYE REDNESS: 0
NAUSEA: 0
BACK PAIN: 0
SORE THROAT: 0
COUGH: 0
CONSTIPATION: 0
SHORTNESS OF BREATH: 0
WHEEZING: 0
SINUS PRESSURE: 0
EYE DISCHARGE: 0
DIARRHEA: 0

## 2022-09-14 ASSESSMENT — PAIN SCALES - GENERAL
PAINLEVEL_OUTOF10: 8
PAINLEVEL_OUTOF10: 0
PAINLEVEL_OUTOF10: 0
PAINLEVEL_OUTOF10: 7
PAINLEVEL_OUTOF10: 0

## 2022-09-14 ASSESSMENT — PAIN DESCRIPTION - LOCATION
LOCATION: BACK;SACRUM
LOCATION: BACK;SACRUM

## 2022-09-14 NOTE — PROGRESS NOTES
[facility] Orthopedic Surgery  Consult Note    Patient: Yancy Martin  Admit Date: 9/12/2022  Requesting Physician: Erum Pisano DO  Room: [address] [place], [place] [address]-    Chief complaint: Left sided buttock pain    HPI: The patient is lying in bed. She reports her buttock pain has improved from yesterday. Has not been ambulating. No MRI yet. Objective:  Vitals:    09/14/22 1330   BP: 127/74   Pulse: 92   Resp: 18   Temp: 98.7 °F (37.1 °C)   SpO2: 96%      Physical Examination:  GENERAL: No apparent distress, well-nourished  SKIN:  Warm and dry  EYES: Nonicteric. ENT: Mucous membranes moist  HEAD: Normocephalic, atraumatic  RESPIRATORY: Resp easy and unlabored  CARDIOVASCULAR: Regular rate and rhythm  GI: Abdomen soft, nontender  NEURO: Awake and alert. No speech defect  PSYCHIATRIC: Appropriate affect; not agitated  MUSCULOSKELETAL:  LEFT hip  Inspection: On exam there are no ulcerations, rashes or lesions about the left hip/pelvis. There is pain to palpation of the left SI joint, but less tender than yesterday. No warmth. No overlying erythema. She has no pain with hip passive ROM. Motor: Intact DF/PF bilaterally. Sensation: Grossly intact to light touch throughout the bilateral lower extremities. Vascular:  2+ bilateral DP pulses.     Labs reviewed:  Recent Labs     09/12/22  1702 09/13/22  0719 09/14/22  0408   WBC 18.6* 10.7 9.9   HGB 11.5* 10.9* 10.1*   HCT 34.9* 33.1* 30.6*   * 493* 492*     Recent Labs     09/12/22  1814 09/13/22  0719 09/14/22  0408   * 137 136   K 3.2* 3.5 3.4*   CL 96* 102 103   CO2 25 25 22   BUN 10 6* 7   CREATININE 0.5* <0.5* <0.5*   GLUCOSE 67* 74 98   CALCIUM 9.1 8.9 8.4   MG 1.80 2.10 2.10     Recent Labs     09/12/22  1702   INR 1.12   PROTIME 14.3       Lab Results   Component Value Date    COLORU DARK YELLOW (A) 09/12/2022    CLARITYU CLOUDY (A) 09/12/2022    PHUR 6.5 09/12/2022    PHUR 6.5 09/12/2022    GLUCOSEU Negative 09/12/2022    BLOODU Negative 09/12/2022 LEUKOCYTESUR SMALL (A) 09/12/2022    BILIRUBINUR Negative 09/12/2022    UROBILINOGEN 1.0 09/12/2022    RBCUA 2 09/12/2022    WBCUA 29 (H) 09/12/2022    BACTERIA Rare (A) 09/12/2022       Imaging:  CT CHEST PULMONARY EMBOLISM W CONTRAST   Final Result   Negative examination for pulmonary embolism. Bibasilar dependent atelectasis. No focal infiltrates or pleural effusions. Emphysema and right apical subpleural bulla or blebs         CT ABDOMEN PELVIS W IV CONTRAST Additional Contrast? None   Final Result   1. Irregularity of the left SI joint with erosive change and fracture at both   the ileal and sacral aspects. This may reflect septic arthritis with   pathologic fractures at those sites. 2. Moderate body wall edema. CT CERVICAL SPINE WO CONTRAST   Final Result   No acute abnormality of the cervical spine. .      Multilevel degenerative disc disease and spurring         CT HEAD WO CONTRAST   Final Result   No acute intracranial abnormality.          MRI LUMBAR SPINE W WO CONTRAST    (Results Pending)   MRI PELVIS W WO CONTRAST    (Results Pending)   IR FLUORO GUIDED NEEDLE PLACEMENT    (Results Pending)       IMPRESSION:  LEFT pathologic/likely septic sacroiliac joint fracture  IVDA  Principal Problem:    Acute metabolic encephalopathy  Active Problems:    Acute encephalopathy    Polysubstance abuse (HCC)    Acute cystitis without hematuria    Cellulitis of buttock    Sacral fracture, closed (HCC)    Hypoglycemia    Severe sepsis (HCC)    Unwitnessed fall    Pulmonary emphysema (HCC)    Mild bibasilar atelectasis    Amphetamine abuse (HCC)    Cocaine abuse (HCC)    Fentanyl use disorder, mild, abuse (HCC)    Screening for HIV (human immunodeficiency virus)    Chronic hepatitis C without hepatic coma (HCC)    Elevated C-reactive protein (CRP)    Elevated sed rate    Septic arthritis of sacroiliac joint ([address]    Fall    Bandemia    Tobacco abuse counseling  Resolved Problems:    * No resolved hospital problems. *      PLAN:  Recommend IV abx per ID at this time. Ultimately, she will need to see [facility] to consider fixation of the SI joint. Given her history this is not perceived to be acute and she is hemodynamically stable and not currently septic, so this is not urgent. - Appears she is withdrawing, so can stabilize her prior to further consideration regarding transfer.    - 50% WB with walker/crutches  - Pain control  - DVT prophylaxis: Lovenox per primary team  - Recreational drug use cessation  - Dispo: uncertain    FEMI Helton - CNP  9/14/2022  4:06 PM

## 2022-09-14 NOTE — PROGRESS NOTES
Brief IR Note    Pt is a 43 y/o M with multiple co morbidities admitted for acute metabolic encephalopathy, L sacroilitis, sacral and ileal fractures and leukocytosis. Imaging work-up showed findings concerning for left SI joint septic arthritis. IR was consulted for aspiration. Review of imaging shows no fluid in the left SI joint or any surrounding fluid collection; there is nothing for IR to aspirate or obtain a sample from. No intervention for IR can be done for this patient. Call IR with any further questions or concerns.     Lennette Gilford MD  Interventional Radiology

## 2022-09-14 NOTE — PROGRESS NOTES
Infectious Diseases   Progress Note      Admission Date: 9/12/2022  Hospital Day: Hospital Day: 3   Attending: Nita Canales MD  Date of service: 9/14/2022     Chief complaint/ Reason for consult:     Severe sepsis on admission with tachypnea, tachycardia, bandemia and acute metabolic encephalopathy on admission  Fall before admission leading to bilateral ileal and sacral aspect fractures of the left sacroiliac joint  Concern for pathologic fractures and left sacroiliac joint septic arthritis on CT imaging  Elevated sed rate and CRP  Bibasal atelectasis    Microbiology:        I have reviewed allavailable micro lab data and cultures    Blood culture (2/2) - collected on 9/12/2022: Negative  Urine culture  - collected on 9/12/2022: Negative      Antibiotics and immunizations:       Current antibiotics: All antibiotics and their doses were reviewed by me    Recent Abx Admin                     meropenem (MERREM) 1,000 mg in sodium chloride 0.9 % 100 mL IVPB (mini-bag) (mg) 1,000 mg New Bag 09/14/22 1115     1,000 mg New Bag  0314     1,000 mg New Bag 09/13/22 1941    vancomycin (VANCOCIN) 1,250 mg in dextrose 5 % 250 mL IVPB (mg) 1,250 mg New Bag 09/14/22 0658     1,250 mg New Bag 09/13/22 1750                      Immunization History: All immunization history was reviewed by me today. Immunization History   Administered Date(s) Administered    COVID-19, J&J, (age 18y+), IM, 0.5 mL 07/22/2021       Known drug allergies: All allergies were reviewed and updated    Allergies   Allergen Reactions    Penicillins Anaphylaxis       Social history:     Social History:  All social andepidemiologic history was reviewed and updated by me today as needed. Tobacco use:   reports that she has been smoking cigarettes. She has been smoking an average of 1 pack per day. She has never used smokeless tobacco.  Alcohol use:   reports that she does not currently use alcohol.   Currently lives in: [address] [place] [address],[id number]   reports current drug use. Drug: Opiates . COVID VACCINATION AND LAB RESULT RECORDS:     Internal Administration   First Dose COVID-19, J&J, (age 18y+), IM, 0.5 mL  07/22/2021   Second Dose           Last COVID Lab No results found for: SARS-COV-2, SARS-COV-2 RNA, SARS-COV-2, SARS-COV-2, SARS-COV-2 BY PCR, SARS-COV-2, SARS-COV-2, SARS-COV-2         Assessment:     The patient is a 42 y.o. old female who  has a past medical history of Hepatitis C and History of heroin abuse ([facility] [address] with following problems:    Severe sepsis on admission with tachypnea, tachycardia, bandemia and acute metabolic encephalopathy on admission-improving  Fall before admission leading to bilateral ileal and sacral aspect fractures of the left sacroiliac joint  Concern for pathologic fractures and left sacroiliac joint septic arthritis on CT imaging-has been on IV vancomycin and meropenem  Elevated sed rate and CRP  Bibasal atelectasis  Chronic emphysema  IV drug user  Amphetamine and heroin abuse-counseling done  Fentanyl abuse  Cocaine abuse  Chronic hepatitis C  Need for HIV screen      Discussion:      The patient is afebrile. Blood and urine cultures from 9/12/2020 remain negative. She has been on empiric IV vancomycin and IV meropenem. White cell count has come down to 9900. Serum creatinine is 0.5. Liver enzymes are okay. AST slightly elevated. Platelet count is elevated at 492,000. HIV screen was negative. She is nonimmune to hepatitis B. Blood cultures from admission remain negative so far. 2D echo reviewed. No obvious valvular vegetations. Plan:     Diagnostic Workup:    Recommend left SI joint diagnostic arthrocentesis and sending the synovial fluid for cell count with differential, glucose, protein, aerobic and anaerobic cultures  Continue to follow  fever curve, WBC count and blood cultures. Continue to monitor blood counts, liver and renal function. Antimicrobials:     The patient has been seen by orthopedic team.  Plans for possible transfer to [facility] noted after the withdrawal  I think patient will need a diagnostic arthrocentesis with effective SI joint to get synovial fluid cultures  Her blood and urine cultures from admission remain negative. To preserve the cultures yield for SI joint aspirate, I would be in favor of withholding further dosing of IV vancomycin IV meropenem while watching her closely clinically to preserve the cultures yield of the SI joint synovial fluid. The antibiotics can be restarted after the aspiration was completed  We will follow up on the culture results and clinical progress and will make further recommendations accordingly. Continue close vitals monitoring. Maintain good glycemic control. Fall precautions. Aspiration precautions. Continue to watch for new fever or diarrhea. DVT prophylaxis. Discussed all above with patient and RN. Discussed with Dr. Jarad Hines        I/v access Management:    Continue to monitor i.v access sites for erythema, induration, discharge or tenderness. As always, continue efforts to minimize tubes/lines/drains as clinically appropriate to reduce chances of line associated infections. Patient education and counseling: The patient was educated in detail about the side-effects of various antibiotics and things to watch for like new rashes, lip swelling, severe reaction, worsening diarrhea, break through fever etc.  Discussed patient's condition and what to expect. All of the patient's questions were addressed in a satisfactory manner and patient verbalized understanding all instructions. Level of complexity of visit and medical decision making: High     Illicit drug use and tobacco use disorder counseling:    I have counseled the patient extensively about risks of using illicit drugs and have encouraged the patient to maintain a healthy drug-free lifestyle.  Information was given about various substance use prevention programs available in the area and their websites including www. addicted. org, www.madd. org, www.catsober. org, www.theJuly SystemsscYesware. com and www. addictionservicescouncil.org.  I have counseled the patient extensively about risks of smoking and have encouraged the patient to maintain a healthy smoke-free lifestyle. Information was given about various smoking cessation programs and their websites like www.smokefree.gov, [place]. quitlogix. org as well as help lines like 1-800-QUIT-NOW and 1-800-LUNG-[place]. TIME SPENT TODAY:     - Spent over  36 minutes on visit (including interval history, physical exam, review of data including labs, cultures, imaging, development and implementation of treatment plan and coordination of complex care). More than 50 percent of this includes face-to-face time spent with the patient for counseling and coordination of care. Thank you for involving me in the care of your patient. I will continue to follow. If you have anyadditional questions, please do not hesitate to contact me. Subjective: Interval history: Interval history was obtained from chart review and patient/ RN. The patient is afebrile. She is tolerating antibiotics okay. No diarrhea     REVIEW OF SYSTEMS:      Review of Systems   Constitutional:  Positive for fatigue. Negative for chills, diaphoresis and fever. HENT:  Negative for ear discharge, ear pain, postnasal drip, rhinorrhea, sinus pressure, sinus pain and sore throat. Eyes:  Negative for discharge and redness. Respiratory:  Negative for cough, shortness of breath and wheezing. Cardiovascular:  Negative for chest pain and leg swelling. Gastrointestinal:  Negative for abdominal pain, constipation, diarrhea and nausea. Endocrine: Negative for cold intolerance, heat intolerance and polydipsia. Genitourinary:  Negative for dysuria, flank pain, frequency, hematuria and urgency.    Musculoskeletal:  Negative for back pain and Cardiovascular:      Rate and Rhythm: Normal rate and regular rhythm. Heart sounds: Normal heart sounds. No murmur heard. No friction rub. Pulmonary:      Effort: No respiratory distress. Breath sounds: No stridor. No wheezing or rales. Abdominal:      General: Bowel sounds are normal.      Palpations: Abdomen is soft. Tenderness: There is no abdominal tenderness. There is no guarding or rebound. Musculoskeletal:         General: Tenderness (Left SI joint area) present. No swelling or deformity. Normal range of motion. Cervical back: Normal range of motion and neck supple. Right lower leg: No edema. Left lower leg: No edema. Lymphadenopathy:      Cervical: No cervical adenopathy. Skin:     General: Skin is warm and dry. Coloration: Skin is not jaundiced. Findings: No bruising, erythema or rash. Neurological:      General: No focal deficit present. Mental Status: She is alert and oriented to person, place, and time. Mental status is at baseline. Motor: No abnormal muscle tone. Psychiatric:         Mood and Affect: Mood normal.         Behavior: Behavior normal.          Lines and drains: All vascular access sites are healthy with no local erythema, discharge or tenderness. Intake and output:    I/O last 3 completed shifts: In: 1508.5 [P.O.:480; IV Piggyback:1028.5]  Out: 1700 [Urine:1700]    Lab Data:   All available labs and old records have been reviewed by me.     CBC:  Recent Labs     09/12/22  1702 09/13/22  0719 09/14/22  0408   WBC 18.6* 10.7 9.9   RBC 4.06 3.85* 3.56*   HGB 11.5* 10.9* 10.1*   HCT 34.9* 33.1* 30.6*   * 493* 492*   MCV 86.0 86.0 85.8   MCH 28.3 28.4 28.2   MCHC 32.9 33.0 32.9   RDW 16.4* 16.5* 16.6*        BMP:  Recent Labs     09/12/22  1814 09/13/22  0719 09/14/22  0408   * 137 136   K 3.2* 3.5 3.4*   CL 96* 102 103   CO2 25 25 22   BUN 10 6* 7   CREATININE 0.5* <0.5* <0.5*   CALCIUM 9.1 8.9 8.4   GLUCOSE

## 2022-09-14 NOTE — PROGRESS NOTES
Patient refused fleet enema. This RN explained to patient the importance of the medication but patient still refused.  Will continue to monitor

## 2022-09-14 NOTE — PROGRESS NOTES
No jugular venous distention. Trachea midline. Respiratory:  Normal respiratory effort. Clear to auscultation, bilaterally without Rales/Wheezes/Rhonchi. Cardiovascular: Regular rate and rhythm with normal S1/S2 without murmurs, rubs or gallops. Abdomen: Soft, non-tender, non-distended with normal bowel sounds. Musculoskeletal: Tender L sacroiliac area. Skin: Skin color, texture, turgor normal.  No rashes or lesions. Neurologic:  Neurovascularly intact without any focal sensory/motor deficits. Cranial nerves: II-XII intact, grossly non-focal.  Psychiatric: Alert and oriented, thought content appropriate, normal insight  Capillary Refill: Brisk, 3 seconds, normal   Peripheral Pulses: +2 palpable, equal bilaterally       Labs:   Recent Labs     09/12/22 1702 09/13/22  0719 09/14/22  0408   WBC 18.6* 10.7 9.9   HGB 11.5* 10.9* 10.1*   HCT 34.9* 33.1* 30.6*   * 493* 492*       Recent Labs     09/12/22 1814 09/13/22  0719 09/14/22  0408   * 137 136   K 3.2* 3.5 3.4*   CL 96* 102 103   CO2 25 25 22   BUN 10 6* 7   CREATININE 0.5* <0.5* <0.5*   CALCIUM 9.1 8.9 8.4       Recent Labs     09/12/22 1814 09/13/22  0719 09/14/22  0408   AST 46* 45* 49*   ALT 25 22 21   BILITOT 0.5 0.5 0.4   ALKPHOS 85 77 72       Recent Labs     09/12/22 1702   INR 1.12       Recent Labs     09/12/22  1814   TROPONINI <0.01         Urinalysis:      Lab Results   Component Value Date/Time    NITRU Negative 09/12/2022 06:31 PM    WBCUA 29 09/12/2022 06:31 PM    BACTERIA Rare 09/12/2022 06:31 PM    RBCUA 2 09/12/2022 06:31 PM    BLOODU Negative 09/12/2022 06:31 PM    SPECGRAV 1.020 09/12/2022 06:31 PM    GLUCOSEU Negative 09/12/2022 06:31 PM       Radiology:  CT CHEST PULMONARY EMBOLISM W CONTRAST   Final Result   Negative examination for pulmonary embolism. Bibasilar dependent atelectasis. No focal infiltrates or pleural effusions.       Emphysema and right apical subpleural bulla or blebs         CT ABDOMEN PELVIS W IV CONTRAST Additional Contrast? None   Final Result   1. Irregularity of the left SI joint with erosive change and fracture at both   the ileal and sacral aspects. This may reflect septic arthritis with   pathologic fractures at those sites. 2. Moderate body wall edema. CT CERVICAL SPINE WO CONTRAST   Final Result   No acute abnormality of the cervical spine. .      Multilevel degenerative disc disease and spurring         CT HEAD WO CONTRAST   Final Result   No acute intracranial abnormality. MRI LUMBAR SPINE W WO CONTRAST    (Results Pending)   MRI PELVIS W WO CONTRAST    (Results Pending)           Assessment/Plan:    Active Hospital Problems    Diagnosis     Polysubstance abuse ([address] [F19.10]      Priority: Medium    Acute cystitis without hematuria [N30.00]      Priority: Medium    Cellulitis of buttock [L09.121]      Priority: Medium    Sacral fracture, closed ([address] [S32.10XA]      Priority: Medium    Hypoglycemia [E16.2]      Priority: Medium    Severe sepsis ([address] [A41.9, R65.20]      Priority: Medium    Unwitnessed fall [R29.6]      Priority: Medium    Pulmonary emphysema ([address] [J43.9]      Priority: Medium    Mild bibasilar atelectasis [J98.11]      Priority: Medium    Amphetamine abuse ([address] [F15.10]      Priority: Medium    Cocaine abuse ([address] [F14.10]      Priority: Medium    Fentanyl use disorder, mild, abuse ([address] [F11.10]      Priority: Medium    Screening for HIV (human immunodeficiency virus) [Z11.4]      Priority: Medium    Chronic hepatitis C without hepatic coma ([address] [B18.2]      Priority: Medium    Elevated C-reactive protein (CRP) [R79.82]      Priority: Medium    Elevated sed rate [R70.0]      Priority: Medium    Septic arthritis of sacroiliac joint ([address] [M46.58]      Priority: Medium    Fall [W19. XXXA]      Priority: Medium    Bandemia [id number]      Priority: Medium    Acute metabolic encephalopathy [A40.52]      Priority: Medium    Acute encephalopathy [G93.40] Priority: Medium     Continue IV Merrem and Vanco  Echo does not support endocarditis  Await MRI Pelvis with contrast  Await MRI L spine with contrast  Elevated ESR and CRP  COWS protocol  DO NOT CALL FOR NARCOTICS   PT/OT eval  Replete K  Counseled on drug cessation  If cultures negative, ID wants IR aspiration    DVT Prophylaxis: Lovenox  Diet: ADULT DIET; Regular; 3 carb choices (45 gm/meal); Low Fat/Low Chol/High Fiber/2 gm Na  Code Status: Full Code  PT/OT Eval Status: Requested    Dispo - snf    Discussed with patient, Velma Montalvo (Ortho NP), nursing and CM. Await imaging. May need IR aspiration per ID.     Rachel Mg MD

## 2022-09-14 NOTE — PROGRESS NOTES
Clinical Pharmacy Note: Pharmacy to Dose Vancomycin    Vancomycin Day: 3  Current Dosing Regimen: 1250 mg q12  Dosing Method: Bayesian Modeling    Random: 10.5    Recent Labs     09/13/22  0719 09/14/22  0408   BUN 6* 7       Recent Labs     09/13/22  0719 09/14/22  0408   CREATININE <0.5* <0.5*       Recent Labs     09/13/22  0719 09/14/22  0408   WBC 10.7 9.9         Intake/Output Summary (Last 24 hours) at 9/14/2022 0801  Last data filed at 9/14/2022 0703  Gross per 24 hour   Intake 1508.48 ml   Output 2650 ml   Net -1141.52 ml         Ht Readings from Last 1 Encounters:   09/13/22 5' (1.524 m)        Wt Readings from Last 1 Encounters:   09/14/22 110 lb 4.8 oz (50 kg)         Body mass index is 21.54 kg/m². Estimated Creatinine Clearance: 103 mL/min (based on SCr of 0.5 mg/dL). Assessment/Plan:  Vancomycin level is supratherapeutic. Level was drawn appropriately in respect to last dose given. Decrease vancomycin regimen to 1000 mg every 12 hours. Bayesian Modeling predicts an AUC of 489 mg/L*hr and trough of 13.6 mg/L. A vancomycin random level has been ordered on 9/15 at 0600 for follow-up. Changes in regimen will be determined based on culture results, renal function, and clinical response. Pharmacy will continue to monitor and adjust regimen as necessary.     Thank you for the consult,    Paty Santos, PharmD, [facility]

## 2022-09-15 ENCOUNTER — APPOINTMENT (OUTPATIENT)
Dept: MRI IMAGING | Age: 47
DRG: 720 | End: 2022-09-15
Payer: MEDICARE

## 2022-09-15 LAB — HCG(URINE) PREGNANCY TEST: NEGATIVE

## 2022-09-15 PROCEDURE — 97116 GAIT TRAINING THERAPY: CPT

## 2022-09-15 PROCEDURE — 97166 OT EVAL MOD COMPLEX 45 MIN: CPT

## 2022-09-15 PROCEDURE — 72158 MRI LUMBAR SPINE W/O & W/DYE: CPT

## 2022-09-15 PROCEDURE — 6370000000 HC RX 637 (ALT 250 FOR IP): Performed by: INTERNAL MEDICINE

## 2022-09-15 PROCEDURE — A9577 INJ MULTIHANCE: HCPCS | Performed by: INTERNAL MEDICINE

## 2022-09-15 PROCEDURE — 2580000003 HC RX 258: Performed by: INTERNAL MEDICINE

## 2022-09-15 PROCEDURE — 2060000000 HC ICU INTERMEDIATE R&B

## 2022-09-15 PROCEDURE — 72197 MRI PELVIS W/O & W/DYE: CPT

## 2022-09-15 PROCEDURE — 97530 THERAPEUTIC ACTIVITIES: CPT

## 2022-09-15 PROCEDURE — 97535 SELF CARE MNGMENT TRAINING: CPT

## 2022-09-15 PROCEDURE — 6360000004 HC RX CONTRAST MEDICATION: Performed by: INTERNAL MEDICINE

## 2022-09-15 PROCEDURE — 97162 PT EVAL MOD COMPLEX 30 MIN: CPT

## 2022-09-15 PROCEDURE — 99233 SBSQ HOSP IP/OBS HIGH 50: CPT | Performed by: INTERNAL MEDICINE

## 2022-09-15 PROCEDURE — 84703 CHORIONIC GONADOTROPIN ASSAY: CPT

## 2022-09-15 RX ORDER — SODIUM CHLORIDE 0.9 % (FLUSH) 0.9 %
10 SYRINGE (ML) INJECTION ONCE
Status: COMPLETED | OUTPATIENT
Start: 2022-09-15 | End: 2022-09-15

## 2022-09-15 RX ORDER — DOXYCYCLINE HYCLATE 100 MG
100 TABLET ORAL 2 TIMES DAILY
Status: DISCONTINUED | OUTPATIENT
Start: 2022-09-15 | End: 2022-09-16

## 2022-09-15 RX ADMIN — TRAMADOL HYDROCHLORIDE 50 MG: 50 TABLET ORAL at 00:14

## 2022-09-15 RX ADMIN — TRAMADOL HYDROCHLORIDE 50 MG: 50 TABLET ORAL at 08:35

## 2022-09-15 RX ADMIN — Medication 10 ML: at 09:17

## 2022-09-15 RX ADMIN — METHOCARBAMOL TABLETS 750 MG: 500 TABLET, COATED ORAL at 08:35

## 2022-09-15 RX ADMIN — DOXYCYCLINE HYCLATE 100 MG: 100 TABLET, COATED ORAL at 20:58

## 2022-09-15 RX ADMIN — Medication 10 ML: at 20:58

## 2022-09-15 RX ADMIN — TRAMADOL HYDROCHLORIDE 50 MG: 50 TABLET ORAL at 20:57

## 2022-09-15 RX ADMIN — TRAMADOL HYDROCHLORIDE 50 MG: 50 TABLET ORAL at 11:58

## 2022-09-15 RX ADMIN — TRAMADOL HYDROCHLORIDE 50 MG: 50 TABLET ORAL at 14:48

## 2022-09-15 RX ADMIN — TRAMADOL HYDROCHLORIDE 50 MG: 50 TABLET ORAL at 16:31

## 2022-09-15 RX ADMIN — GABAPENTIN 300 MG: 300 CAPSULE ORAL at 00:14

## 2022-09-15 RX ADMIN — GABAPENTIN 300 MG: 300 CAPSULE ORAL at 20:53

## 2022-09-15 RX ADMIN — METHOCARBAMOL TABLETS 750 MG: 500 TABLET, COATED ORAL at 20:53

## 2022-09-15 RX ADMIN — METHOCARBAMOL TABLETS 750 MG: 500 TABLET, COATED ORAL at 14:48

## 2022-09-15 RX ADMIN — GABAPENTIN 300 MG: 300 CAPSULE ORAL at 08:36

## 2022-09-15 RX ADMIN — LAMOTRIGINE 100 MG: 100 TABLET ORAL at 08:35

## 2022-09-15 RX ADMIN — GABAPENTIN 300 MG: 300 CAPSULE ORAL at 16:32

## 2022-09-15 RX ADMIN — GADOBENATE DIMEGLUMINE 10 ML: 529 INJECTION, SOLUTION INTRAVENOUS at 20:22

## 2022-09-15 RX ADMIN — Medication 10 ML: at 20:24

## 2022-09-15 RX ADMIN — HYDROXYZINE PAMOATE 50 MG: 25 CAPSULE ORAL at 23:11

## 2022-09-15 RX ADMIN — DOXYCYCLINE HYCLATE 100 MG: 100 TABLET, COATED ORAL at 13:18

## 2022-09-15 RX ADMIN — TRAMADOL HYDROCHLORIDE 50 MG: 50 TABLET ORAL at 23:11

## 2022-09-15 ASSESSMENT — PAIN SCALES - GENERAL
PAINLEVEL_OUTOF10: 7
PAINLEVEL_OUTOF10: 5
PAINLEVEL_OUTOF10: 6
PAINLEVEL_OUTOF10: 8
PAINLEVEL_OUTOF10: 8
PAINLEVEL_OUTOF10: 0
PAINLEVEL_OUTOF10: 8
PAINLEVEL_OUTOF10: 7
PAINLEVEL_OUTOF10: 7

## 2022-09-15 ASSESSMENT — ENCOUNTER SYMPTOMS
SORE THROAT: 0
WHEEZING: 0
NAUSEA: 0
EYE DISCHARGE: 0
BACK PAIN: 0
RHINORRHEA: 0
DIARRHEA: 0
ABDOMINAL PAIN: 0
SHORTNESS OF BREATH: 0
SINUS PAIN: 0
EYE REDNESS: 0
CONSTIPATION: 0
SINUS PRESSURE: 0
COUGH: 0

## 2022-09-15 ASSESSMENT — PAIN DESCRIPTION - FREQUENCY: FREQUENCY: CONTINUOUS

## 2022-09-15 ASSESSMENT — PAIN DESCRIPTION - LOCATION
LOCATION: BACK;SACRUM
LOCATION: BUTTOCKS
LOCATION: BACK
LOCATION: BUTTOCKS
LOCATION: BACK;BUTTOCKS
LOCATION: BUTTOCKS;BACK
LOCATION: BACK;BUTTOCKS

## 2022-09-15 ASSESSMENT — PAIN DESCRIPTION - PAIN TYPE
TYPE: ACUTE PAIN
TYPE: CHRONIC PAIN;ACUTE PAIN

## 2022-09-15 ASSESSMENT — PAIN DESCRIPTION - ORIENTATION
ORIENTATION: LEFT
ORIENTATION: LEFT

## 2022-09-15 ASSESSMENT — PAIN DESCRIPTION - DESCRIPTORS
DESCRIPTORS: BURNING
DESCRIPTORS: THROBBING;BURNING

## 2022-09-15 NOTE — PLAN OF CARE
Problem: Pain  Goal: Verbalizes/displays adequate comfort level or baseline comfort level  Outcome: Progressing     Problem: Anxiety  Goal: Will report anxiety at manageable levels  Outcome: Progressing     Problem: Hematologic - Adult  Goal: Maintains hematologic stability  Outcome: Progressing     Vitals:    09/14/22 1945   BP: 113/71   Pulse: 89   Resp: 16   Temp: 98.3 °F (36.8 °C)   SpO2: 97%     VSS at shift change. Pt c/o pain tonight in back/sacrum, declined Tylenol, and agreeable to Ultram. COWS score 13 with anxiety noted at 2056. Gave 50mg Ultram at 2103 and trazodone per pt request for sleep. See [facility] & all flowsheets. Will continue to monitor.

## 2022-09-15 NOTE — PROGRESS NOTES
[facility] [address] Department   Phone: [phone]    Physical Therapy    [x] Initial Evaluation            [] Daily Treatment Note         [] Discharge Summary      Patient: Kwadwo Santana   : 1975   MRN: [id number]   Date of Service:  9/15/2022  Admitting Diagnosis: Acute metabolic encephalopathy  Current Admission Summary: Pt is a 43 yo female with a hx of Hep C and heroin abuse. The patient presents after a fall in a parking lot. She was admitted for acute encephalopathy, sepsis, and (L) buttock pain. Pelvic x-ray showed (L) SI erosive changes and comminuted fxs on both sides of the (L) SI joint. Ortho consulted - recommending 50% (L) LE NWB with walker or crutches. ID following pt. Past Medical History:  has a past medical history of Hepatitis C and History of heroin abuse ([facility] [address] Past Surgical History:  has a past surgical history that includes  section. Discharge Recommendations: Kwadwo Santana scored a 19/24 on the AM-PAC short mobility form. Current research shows that an AM-PAC score of 18 or greater is typically associated with a discharge to the patient's home setting. Based on the patient's AM-PAC score and their current functional mobility deficits, it is recommended that the patient have 2-3 sessions per week of Physical Therapy at d/c to increase the patient's independence. At this time, this patient demonstrates the endurance and safety to discharge home with home PT and a follow up treatment frequency of 2-3x/wk. Please see assessment section for further patient specific details. Pt currently declining [address] however would benefit to promote consistency with Wbing precautions and safety.      HOME HEALTH CARE: LEVEL 1 STANDARD    - Initial home health evaluation to occur within 24-48 hours, in patient home   - Therapy to evaluate with goal of regaining prior level of functioning   - Therapy to evaluate if patient has [address] needs for personal care    If patient discharges prior to next session this note will serve as a discharge summary. Please see below for the latest assessment towards goals. DME Required For Discharge: rolling walker (pt states she has one, however is not sure it came with her to the ED)    Precautions/Restrictions: high fall risk, weight bearing  Weight Bearing Restrictions: partial weight bearing - 50 %  [] Right Upper Extremity  [] Left Upper Extremity [] Right Lower Extremity  [x] Left Lower Extremity     Required Braces/Orthotics: no braces required   [] Right  [] Left  Positional Restrictions:no positional restrictions    Pre-Admission Information   Lives With: significant other, .   Comment: works full time     Type of Home: hotel  Home Layout: one level  Home Access: level entry  [facility]: [facility]/shower unit  [employer]: grab bars in shower  Toilet Height: standard [address]: rolling walker  Transfer Assistance: Independent without use of device  Ambulation Assistance:Independent without use of device  ADL Assistance: independent with all ADL's  IADL Assistance: independent with homemaking tasks, go out to eat for meals, the hotel has a laundry room  Active :        [x] Yes  [] No  Hand Dominance: [] Left  [x] Right  Current Employment: unemployed  Hobbies:   Recent Falls: 1 fall prior to admission, tripped    Examination   Vision:   Vision Gross Assessment: WFL  Hearing:   WFL  Observation:   General Observation:  mild swelling to (R) forearm  Posture:   Fair - forward head, rounded shoulders  Sensation:   WFL  Proprioception:    WFL  ROM:   (B) LE AROM WFL  Strength:   (L) Hip: deferred 2/2 pain        (R) Hip flex: 5  (L) Knee: deferred 2/2 pain    (R) Knee flex/ext: 5  (L) Ankle: deferred 2/2 pain   (R) Ankle DF/PF: 5  Decision Making: medium complexity  Clinical Presentation: evolving      Subjective  General: Pt in (R) sidelying upon therapist arrival. States she Interventions  See OT note for assist with ADLs    Functional Outcomes  -PAC Inpatient Mobility Raw Score : 19              Cognition  Overall Cognitive Status: WFL  Arousal/Alterness: appropriate responses to stimuli  Following Commands: follows all commands without difficulty  Attention Span: attends with cues to redirect, difficulty dividing attention  Memory: appears intact  Safety Judgement: decreased awareness of need for safety  Problem Solving: decreased awareness of errors, assistance required to identify errors made  Insights: decreased awareness of deficits  Initiation: requires cues for some  Sequencing: requires cues for some  Comments: Pt with increased anxiety initially however seems to calm throughout session. Questionable carry over with education provided. Orientation:    alert and oriented x 4  Command Following:   [facility]    Education  Barriers To Learning: cognition and emotional  Patient Education: patient educated on goals, PT role and benefits, plan of care, precautions, weight-bearing education, general safety, functional mobility training, proper use of assistive device/equipment, disease specific education, transfer training, discharge recommendations  Learning Assessment:  patient verbalizes understanding, would benefit from continued reinforcement, patient will require reinforcement due to cognitive deficits    Assessment  Activity Tolerance: Pt tolerated treatment session well. BP sitting /79, HR 86 bpm. BP after ambulation 132/78. Impairments Requiring Therapeutic Intervention: decreased functional mobility, decreased ADL status, decreased strength, decreased endurance, decreased balance, decreased IADL, increased pain, decreased posture  Prognosis: good  Clinical Assessment: Pt is a 43 yo female admitted to [facility] for acute encephalopathy and sepsis, found to have (L) sacral and ilieal comminuted fxs.  At this time the patient requires constant cues and visual feedback to consistently maintain (L) LE 50% WBing, however the patient is able to ambulate household distances. She demonstrated minimal impact from pain during the session. Recommending continued skilled PT to safely progress independence with functional mobility, however the patient is resistive to further therapy outside of acute care. Her hx of drug use will likely result in longer healing time for the fractures. Per ortho there is a possibility she will need transferred to [facility] for pelvic stabilization.    Safety Interventions: patient left in bed, bed alarm in place, call light within reach, gait belt, patient at risk for falls, telesitter in use, nurse notified, and seizure mats in use    Plan  Frequency: 5-7 x/week  Current Treatment Recommendations: strengthening, balance training, functional mobility training, transfer training, gait training, endurance training, patient/caregiver education, pain management, home exercise program, safety education, equipment evaluation/education, and positioning    Goals  Patient Goals: Go home   Short Term Goals:  Time Frame: Before discharge  Patient will complete bed mobility at Independent   Patient will complete transfers at modified independent while maintaining 50% (L)  So [facility]   Patient will ambulate 50 ft with use of rolling walker at modified independent while maintaining 50% (L) LE WBing   Patient to maintain standing at modified independent for 10 minutes while maintaining 50% (L) LE WBing in order to complete ADLs    Therapy Session Time      Individual Group Co-treatment   Time In     1102   Time Out     1155   Minutes     53     Timed Code Treatment Minutes:  38 Minutes  Total Treatment Minutes:  53 minutes       Electronically Signed By: DAVID Kaur PT, DPT #[id number]

## 2022-09-15 NOTE — PROGRESS NOTES
Infectious Diseases   Progress Note      Admission Date: 9/12/2022  Hospital Day: Hospital Day: 4   Attending: John Jones MD  Date of service: 9/15/2022     Chief complaint/ Reason for consult:     Severe sepsis on admission with tachypnea, tachycardia, bandemia and acute metabolic encephalopathy on admission  Fall before admission leading to bilateral ileal and sacral aspect fractures of the left sacroiliac joint  Concern for pathologic fractures and left sacroiliac joint septic arthritis on CT imaging  Elevated sed rate and CRP  Bibasal atelectasis    Microbiology:        I have reviewed allavailable micro lab data and cultures    Blood culture (2/2) - collected on 9/12/2022: Negative  Urine culture  - collected on 9/12/2022: Negative      Antibiotics and immunizations:       Current antibiotics: All antibiotics and their doses were reviewed by me    Recent Abx Admin        No antibiotic orders with administrations found. Immunization History: All immunization history was reviewed by me today. Immunization History   Administered Date(s) Administered    COVID-19, J&J, (age 18y+), IM, 0.5 mL 07/22/2021       Known drug allergies: All allergies were reviewed and updated    Allergies   Allergen Reactions    Penicillins Anaphylaxis       Social history:     Social History:  All social andepidemiologic history was reviewed and updated by me today as needed. Tobacco use:   reports that she has been smoking cigarettes. She has been smoking an average of 1 pack per day. She has never used smokeless tobacco.  Alcohol use:   reports that she does not currently use alcohol. Currently lives in: [facility]   reports current drug use. Drug: Opiates .      COVID VACCINATION AND LAB RESULT RECORDS:     Internal Administration   First Dose COVID-19, J&J, (age 18y+), IM, 0.5 mL  07/22/2021   Second Dose           Last COVID Lab No results found for: SARS-COV-2, SARS-COV-2 RNA, SARS-COV-2, SARS-COV-2, SARS-COV-2 BY PCR, SARS-COV-2, SARS-COV-2, SARS-COV-2         Assessment:     The patient is a 42 y.o. old female who  has a past medical history of Hepatitis C and History of heroin abuse ([facility] [address] with following problems:    Severe sepsis on admission with tachypnea, tachycardia, bandemia and acute metabolic encephalopathy on admission-resolved  Fall before admission leading to bilateral ileal and sacral aspect fractures of the left sacroiliac joint  Concern for pathologic fractures and left sacroiliac joint septic arthritis on CT imaging-unclear if there is infection  Elevated sed rate and CRP  Bibasal atelectasis  Chronic emphysema  IV drug user  Amphetamine and heroin abuse-counseling done  Fentanyl abuse  Cocaine abuse  Chronic hepatitis C  Need for HIV screen-this was negative on September 13      Discussion:      The patient is afebrile. I had withheld her IV antibiotics yesterday. The patient was evaluated by interventional radiology. Please refer interventional radiology note in the chart    IR does not think patient has any fluid in the SI joint to aspirate. Blood cultures from admission have remained negative      Plan:     Diagnostic Workup:    Follow-up on MRI of the pelvis  Continue to follow  fever curve, WBC count and blood cultures. Continue to monitor blood counts, liver and renal function. Baseline urine pregnancy test  as I am starting patient on doxycycline    Antimicrobials:    No evidence of MRSA so far. Will order empiric doxycycline 100 mg every 12 hour for now  If MRI of the pelvis shows SI joint infection and patient continues to do okay on doxycycline, can plan for a long-term course of oral doxycycline  Continue close vitals monitoring. Maintain good glycemic control. Fall precautions. Aspiration precautions. Continue to watch for new fever or diarrhea. DVT prophylaxis. Discussed all above with patient and RN.   Discussed with Dr. Patric Galvin Monitoring:    Continue monitoring for antibiotic toxicity as follows: CBC, CMP   Continue to watch for following: new or worsening fever, new hypotension, hives, lip swelling and redness or purulence at vascular access sites. I/v access Management:    Continue to monitor i.v access sites for erythema, induration, discharge or tenderness. As always, continue efforts to minimize tubes/lines/drains as clinically appropriate to reduce chances of line associated infections. Patient education and counseling: The patient was educated in detail about the side-effects of various antibiotics and things to watch for like new rashes, lip swelling, severe reaction, worsening diarrhea, break through fever etc.  Discussed patient's condition and what to expect. All of the patient's questions were addressed in a satisfactory manner and patient verbalized understanding all instructions. Level of complexity of visit and medical decision making: High     Doxycyline/minocycline related instructions: Take Doxycyline/minocycline with a full glass of water and stay upright or sitting up after taking it for 30 minutes as it can cause food pipe irritation (pill esophagitis). Use sunscreen when going in bright sun while on Doxycycline/minocycline as it can cause photosensitivity. Take 1 hour before or 2 hour after dairy, calcium, iron, magnesium, aluminum or zinc.     TIME SPENT TODAY:     - Spent over  36 minutes on visit (including interval history, physical exam, review of data including labs, cultures, imaging, development and implementation of treatment plan and coordination of complex care). More than 50 percent of this includes face-to-face time spent with the patient for counseling and coordination of care. Thank you for involving me in the care of your patient. I will continue to follow. If you have anyadditional questions, please do not hesitate to contact me. Subjective:      Interval history: Interval history was obtained from chart review and patient/ RN. She remains afebrile. He is tolerating antibiotics okay. No diarrhea     REVIEW OF SYSTEMS:      Review of Systems   Constitutional:  Negative for chills, diaphoresis and fever. HENT:  Negative for ear discharge, ear pain, postnasal drip, rhinorrhea, sinus pressure, sinus pain and sore throat. Eyes:  Negative for discharge and redness. Respiratory:  Negative for cough, shortness of breath and wheezing. Cardiovascular:  Negative for chest pain and leg swelling. Gastrointestinal:  Negative for abdominal pain, constipation, diarrhea and nausea. Endocrine: Negative for cold intolerance, heat intolerance and polydipsia. Genitourinary:  Negative for dysuria, flank pain, frequency, hematuria and urgency. Musculoskeletal:  Negative for back pain and myalgias. Skin:  Negative for rash. Allergic/Immunologic: Negative for immunocompromised state. Neurological:  Negative for dizziness, seizures and headaches. Hematological:  Does not bruise/bleed easily. Psychiatric/Behavioral:  Negative for agitation, hallucinations and suicidal ideas. The patient is not nervous/anxious. All other systems reviewed and are negative. Past Medical History: All past medical history reviewed today. Past Medical History:   Diagnosis Date    Hepatitis C     History of heroin abuse ([facility] [address]     last used [id number]       Past Surgical History: All past surgical history was reviewed today. Past Surgical History:   Procedure Laterality Date     SECTION         Family History: All family history was reviewed today. History reviewed. No pertinent family history.     Objective:       PHYSICAL EXAM:      Vitals:   Vitals:    09/15/22 0813 09/15/22 0905 09/15/22 0919 09/15/22 1158   BP:   120/80 132/78   Pulse:   75 78   Resp:  16 16 16   Temp:   98.6 °F (37 °C) 98.6 °F (37 °C)   TempSrc:   Oral Oral   SpO2:    97%   Weight: 109 lb 11.2 oz (49.8 kg)      Height:           Physical Exam  Vitals and nursing note reviewed. Constitutional:       Appearance: She is well-developed. She is not diaphoretic. Comments: The patient was seen earlier today. HENT:      Head: Normocephalic and atraumatic. Right Ear: External ear normal. There is no impacted cerumen. Left Ear: External ear normal. There is no impacted cerumen. Nose: Nose normal.      Mouth/Throat:      Mouth: Mucous membranes are moist.      Pharynx: Oropharynx is clear. No oropharyngeal exudate. Eyes:      General: No scleral icterus. Right eye: No discharge. Left eye: No discharge. Conjunctiva/sclera: Conjunctivae normal.      Pupils: Pupils are equal, round, and reactive to light. Neck:      Thyroid: No thyromegaly. Cardiovascular:      Rate and Rhythm: Normal rate and regular rhythm. Heart sounds: Normal heart sounds. No murmur heard. No friction rub. Pulmonary:      Effort: No respiratory distress. Breath sounds: No stridor. No wheezing or rales. Abdominal:      General: Bowel sounds are normal.      Palpations: Abdomen is soft. Tenderness: There is no abdominal tenderness. There is no guarding or rebound. Musculoskeletal:         General: Tenderness present. No swelling or deformity. Normal range of motion. Cervical back: Normal range of motion and neck supple. Right lower leg: No edema. Left lower leg: No edema. Comments: Mild Tenderness in the left SI joint area   Lymphadenopathy:      Cervical: No cervical adenopathy. Skin:     General: Skin is warm and dry. Coloration: Skin is not jaundiced. Findings: No bruising, erythema or rash. Neurological:      General: No focal deficit present. Mental Status: She is alert and oriented to person, place, and time. Mental status is at baseline. Motor: No abnormal muscle tone.    Psychiatric:         Mood and Affect: Mood normal. Behavior: Behavior normal.          *    Lines and drains: All vascular access sites are healthy with no local erythema, discharge or tenderness. Intake and output:    I/O last 3 completed shifts: In: 1053.9 [P.O.:480; I.V.:10; IV Piggyback:563.9]  Out: 2535 [Urine:7675]    Lab Data:   All available labs and old records have been reviewed by me. CBC:  Recent Labs     09/12/22  1702 09/13/22  0719 09/14/22  0408   WBC 18.6* 10.7 9.9   RBC 4.06 3.85* 3.56*   HGB 11.5* 10.9* 10.1*   HCT 34.9* 33.1* 30.6*   * 493* 492*   MCV 86.0 86.0 85.8   MCH 28.3 28.4 28.2   MCHC 32.9 33.0 32.9   RDW 16.4* 16.5* 16.6*          BMP:  Recent Labs     09/12/22  1814 09/13/22 0719 09/14/22  0408   * 137 136   K 3.2* 3.5 3.4*   CL 96* 102 103   CO2 25 25 22   BUN 10 6* 7   CREATININE 0.5* <0.5* <0.5*   CALCIUM 9.1 8.9 8.4   GLUCOSE 67* 74 98          Hepatic Function Panel:   Lab Results   Component Value Date/Time    ALKPHOS 72 09/14/2022 04:08 AM    ALT 21 09/14/2022 04:08 AM    AST 49 09/14/2022 04:08 AM    PROT 6.1 09/14/2022 04:08 AM    BILITOT 0.4 09/14/2022 04:08 AM    LABALBU 2.2 09/14/2022 04:08 AM       CPK: No results found for: CKTOTAL  ESR:   Lab Results   Component Value Date    SEDRATE 66 (H) 09/13/2022     CRP:   Lab Results   Component Value Date    CRP 94.4 (H) 09/13/2022           Imaging: All pertinent images and reports for the current visit were reviewed by me during this visit. I reviewed the chest x-ray/CT scan/MRI images and independently interpreted the findings and results today. CT CHEST PULMONARY EMBOLISM W CONTRAST   Final Result   Negative examination for pulmonary embolism. Bibasilar dependent atelectasis. No focal infiltrates or pleural effusions. Emphysema and right apical subpleural bulla or blebs         CT ABDOMEN PELVIS W IV CONTRAST Additional Contrast? None   Final Result   1.  Irregularity of the left SI joint with erosive change and fracture at both   the ileal and sacral aspects. This may reflect septic arthritis with   pathologic fractures at those sites. 2. Moderate body wall edema. CT CERVICAL SPINE WO CONTRAST   Final Result   No acute abnormality of the cervical spine. .      Multilevel degenerative disc disease and spurring         CT HEAD WO CONTRAST   Final Result   No acute intracranial abnormality. MRI LUMBAR SPINE W WO CONTRAST    (Results Pending)   MRI PELVIS W WO CONTRAST    (Results Pending)       Medications: All current and past medications were reviewed. sodium chloride flush  10 mL IntraVENous 2 times per day    lamoTRIgine  100 mg Oral Daily        sodium chloride         traMADol, methocarbamol, dicyclomine, gabapentin, hydrOXYzine pamoate, promethazine, traZODone, sodium chloride flush, sodium chloride, magnesium sulfate, promethazine **OR** ondansetron, acetaminophen **OR** acetaminophen      Problem list:       Patient Active Problem List   Diagnosis Code    Acute encephalopathy G93.40    Polysubstance abuse ([address] F19.10    Acute cystitis without hematuria N30.00    Cellulitis of buttock L03.317    Sacral fracture, closed ([address] S32.10XA    Hypoglycemia E16.2    Severe sepsis (HCC) A41.9, R65.20    Unwitnessed fall R29.6    Pulmonary emphysema ([address] J43.9    Mild bibasilar atelectasis J98.11    Amphetamine abuse ([address] F15.10    Cocaine abuse ([address] F14.10    Fentanyl use disorder, mild, abuse ([address] F11.10    Screening for HIV (human immunodeficiency virus) Z11.4    Chronic hepatitis C without hepatic coma ([address] B18.2    Elevated C-reactive protein (CRP) R79.82    Elevated sed rate R70.0    Septic arthritis of sacroiliac joint [facility]) M46.58    Fall W19. Yumiko Hymen    Bandemia Q74.893    Acute metabolic encephalopathy A15.80    Tobacco abuse counseling Z71.6       Please note that this chart was generated using Dragon dictation software.  Although every effort was made to ensure the accuracy of this automated transcription, some errors in transcription may have occurred inadvertently. If you may need any clarification, please do not hesitate to contact me through EPIC or at the phone number provided below with my electronic signature. Any pictures or media included in this note were obtained after taking informed verbal consent from the patient and with their approval to include those in the patient's medical record. Mirtha Workman MD, MPH, [address]  9/15/2022, 12:21 PM  [facility]   [address] [place] [address]., [address] [place], [address] [place] [address]  Office: [phone]  Fax: [phone]  In-person Clinic days:  Tuesday & Thursday a.m. Virtual clinic days: Monday, Wednesday & Friday a.m.

## 2022-09-15 NOTE — PROGRESS NOTES
Hospitalist Progress Note      PCP: No primary care provider on file. Date of Admission: 9/12/2022    Chief Complaint:  [facility] Course: 43 yo F with Hep C, h/o cocaine abuse, fentanyl abuse, amphetamine abuse was brought to ER for AMS. Admitted as inpatient for acute metabolic encephalopathy, L sacroiliitis, sacral and ileal fractures and leukocytosis. Followed by ID and Ortho. For MRI Pelvis and L spine. Started on IV Abx. Echo:  Normal left ventricle size, wall thickness, and systolic function with an   estimated ejection fraction of 60%. No regional wall motion abnormalities are seen. Right atrial yuridia terminalis more prominent. No suggestion of vegetation. IR unable to aspirate SI joint as no fluid noted. Changed to Doxycycine on 9/15. Subjective:  Patient with back pain. No CP, SOB, HA or abdominal pain. No fevers. Medications:  Reviewed    Infusion Medications    sodium chloride       Scheduled Medications    doxycycline hyclate  100 mg Oral BID    sodium chloride flush  10 mL IntraVENous 2 times per day    lamoTRIgine  100 mg Oral Daily     PRN Meds: traMADol, methocarbamol, dicyclomine, gabapentin, hydrOXYzine pamoate, promethazine, traZODone, sodium chloride flush, sodium chloride, magnesium sulfate, promethazine **OR** ondansetron, acetaminophen **OR** acetaminophen      Intake/Output Summary (Last 24 hours) at 9/15/2022 1813  Last data filed at 9/15/2022 1518  Gross per 24 hour   Intake 10 ml   Output 4275 ml   Net -4265 ml         Physical Exam Performed:    /74   Pulse 70   Temp 98.2 °F (36.8 °C) (Oral)   Resp 16   Ht 5' (1.524 m)   Wt 109 lb 11.2 oz (49.8 kg)   LMP  (LMP Unknown)   SpO2 97%   BMI 21.42 kg/m²     General appearance: No apparent distress, appears stated age and cooperative. HEENT: Pupils equal, round, and reactive to light. Conjunctivae/corneas clear. Neck: Supple, with full range of motion. No jugular venous distention.  Trachea midline. Respiratory:  Normal respiratory effort. Clear to auscultation, bilaterally without Rales/Wheezes/Rhonchi. Cardiovascular: Regular rate and rhythm with normal S1/S2 without murmurs, rubs or gallops. Abdomen: Soft, non-tender, non-distended with normal bowel sounds. Musculoskeletal: Tender L sacroiliac area. Skin: Skin color, texture, turgor normal.  No rashes or lesions. Neurologic:  Neurovascularly intact without any focal sensory/motor deficits. Cranial nerves: II-XII intact, grossly non-focal.  Psychiatric: Alert and oriented, thought content appropriate, normal insight  Capillary Refill: Brisk, 3 seconds, normal   Peripheral Pulses: +2 palpable, equal bilaterally       Labs:   Recent Labs     09/13/22 0719 09/14/22  0408   WBC 10.7 9.9   HGB 10.9* 10.1*   HCT 33.1* 30.6*   * 492*       Recent Labs     09/12/22 1814 09/13/22 0719 09/14/22  0408   * 137 136   K 3.2* 3.5 3.4*   CL 96* 102 103   CO2 25 25 22   BUN 10 6* 7   CREATININE 0.5* <0.5* <0.5*   CALCIUM 9.1 8.9 8.4       Recent Labs     09/12/22 1814 09/13/22 0719 09/14/22  0408   AST 46* 45* 49*   ALT 25 22 21   BILITOT 0.5 0.5 0.4   ALKPHOS 85 77 72       No results for input(s): INR in the last 72 hours. Recent Labs     09/12/22 1814   TROPONINI <0.01         Urinalysis:      Lab Results   Component Value Date/Time    NITRU Negative 09/12/2022 06:31 PM    WBCUA 29 09/12/2022 06:31 PM    BACTERIA Rare 09/12/2022 06:31 PM    RBCUA 2 09/12/2022 06:31 PM    BLOODU Negative 09/12/2022 06:31 PM    SPECGRAV 1.020 09/12/2022 06:31 PM    GLUCOSEU Negative 09/12/2022 06:31 PM       Radiology:  CT CHEST PULMONARY EMBOLISM W CONTRAST   Final Result   Negative examination for pulmonary embolism. Bibasilar dependent atelectasis. No focal infiltrates or pleural effusions. Emphysema and right apical subpleural bulla or blebs         CT ABDOMEN PELVIS W IV CONTRAST Additional Contrast? None   Final Result   1.  Irregularity of the left SI joint with erosive change and fracture at both   the ileal and sacral aspects. This may reflect septic arthritis with   pathologic fractures at those sites. 2. Moderate body wall edema. CT CERVICAL SPINE WO CONTRAST   Final Result   No acute abnormality of the cervical spine. .      Multilevel degenerative disc disease and spurring         CT HEAD WO CONTRAST   Final Result   No acute intracranial abnormality. MRI LUMBAR SPINE W WO CONTRAST    (Results Pending)   MRI PELVIS W WO CONTRAST    (Results Pending)           Assessment/Plan:    Active Hospital Problems    Diagnosis     Tobacco abuse counseling [Z71.6]      Priority: Medium    Polysubstance abuse ([address] [F19.10]      Priority: Medium    Acute cystitis without hematuria [N30.00]      Priority: Medium    Cellulitis of buttock [Z77.372]      Priority: Medium    Sacral fracture, closed ([address] [S32.10XA]      Priority: Medium    Hypoglycemia [E16.2]      Priority: Medium    Severe sepsis ([address] [A41.9, R65.20]      Priority: Medium    Unwitnessed fall [R29.6]      Priority: Medium    Pulmonary emphysema ([address] [J43.9]      Priority: Medium    Mild bibasilar atelectasis [J98.11]      Priority: Medium    Amphetamine abuse ([address] [F15.10]      Priority: Medium    Cocaine abuse ([address] [F14.10]      Priority: Medium    Fentanyl use disorder, mild, abuse ([address] [F11.10]      Priority: Medium    Screening for HIV (human immunodeficiency virus) [Z11.4]      Priority: Medium    Chronic hepatitis C without hepatic coma ([address] [B18.2]      Priority: Medium    Elevated C-reactive protein (CRP) [R79.82]      Priority: Medium    Elevated sed rate [R70.0]      Priority: Medium    Septic arthritis of sacroiliac joint ([address] [M46.58]      Priority: Medium    Fall [W19. XXXA]      Priority: Medium    Bandemia [D72.825]      Priority: Medium    Acute metabolic encephalopathy [D05.24]      Priority: Medium    Acute encephalopathy [G93.40]      Priority: Medium     ID changed IV Merrem and Vanco to PO Doxycycline  Echo does not support endocarditis  Awaiting MRI Pelvis with contrast  Awaiting MRI L spine with contrast  Elevated ESR and CRP  COWS protocol  DO NOT CALL FOR NARCOTICS   PT/OT eval recommend home PT/OT  Replete K  Counseled on drug cessation  IR aspiration not possible  Remove Cooley    DVT Prophylaxis: Lovenox  Diet: ADULT DIET; Regular; 3 carb choices (45 gm/meal); Low Fat/Low Chol/High Fiber/2 gm Na  Code Status: Full Code  PT/OT Eval Status: Requested    Dispo - FDC    Discussed with patient, Dr Merari Kincaid (ID), nursing and CM. Awaiting MRIs still.     John Jones MD

## 2022-09-15 NOTE — PROGRESS NOTES
[id number] [facility] Orthopedic Surgery  Progress note    Chief complaint: Left sided buttock pain    HPI: The patient is lying in bed. She reports her buttock pain is stable from yesterday. Has not been ambulating. No MRI yet. Objective:  Vitals:    09/15/22 1518   BP:    Pulse:    Resp: 16   Temp:    SpO2:       Physical Examination:  GENERAL: No apparent distress, well-nourished  SKIN:  Warm and dry  EYES: Nonicteric. ENT: Mucous membranes moist  HEAD: Normocephalic, atraumatic  RESPIRATORY: Resp easy and unlabored  CARDIOVASCULAR: Regular rate and rhythm  GI: Abdomen soft, nontender  NEURO: Awake and alert. No speech defect  PSYCHIATRIC: Appropriate affect; not agitated  MUSCULOSKELETAL:  LEFT hip  Inspection: On exam there are no ulcerations, rashes or lesions about the left hip/pelvis. There is pain to palpation of the left SI joint, but less tender than yesterday. No warmth. No overlying erythema. She has no pain with hip passive ROM. Motor: Intact DF/PF bilaterally. Sensation: Grossly intact to light touch throughout the bilateral lower extremities. Vascular:  2+ bilateral DP pulses.     Labs reviewed:  Recent Labs     09/12/22  1702 09/13/22  0719 09/14/22  0408   WBC 18.6* 10.7 9.9   HGB 11.5* 10.9* 10.1*   HCT 34.9* 33.1* 30.6*   * 493* 492*     Recent Labs     09/12/22  1814 09/13/22  0719 09/14/22  0408   * 137 136   K 3.2* 3.5 3.4*   CL 96* 102 103   CO2 25 25 22   BUN 10 6* 7   CREATININE 0.5* <0.5* <0.5*   GLUCOSE 67* 74 98   CALCIUM 9.1 8.9 8.4   MG 1.80 2.10 2.10     Recent Labs     09/12/22 1702   INR 1.12   PROTIME 14.3       Lab Results   Component Value Date    COLORU DARK YELLOW (A) 09/12/2022    CLARITYU CLOUDY (A) 09/12/2022    PHUR 6.5 09/12/2022    PHUR 6.5 09/12/2022    GLUCOSEU Negative 09/12/2022    BLOODU Negative 09/12/2022    LEUKOCYTESUR SMALL (A) 09/12/2022    BILIRUBINUR Negative 09/12/2022    UROBILINOGEN 1.0 09/12/2022    RBCUA 2 09/12/2022    WBCUA 29 (H) 09/12/2022    BACTERIA Rare (A) 09/12/2022       Imaging:  CT CHEST PULMONARY EMBOLISM W CONTRAST   Final Result   Negative examination for pulmonary embolism. Bibasilar dependent atelectasis. No focal infiltrates or pleural effusions. Emphysema and right apical subpleural bulla or blebs         CT ABDOMEN PELVIS W IV CONTRAST Additional Contrast? None   Final Result   1. Irregularity of the left SI joint with erosive change and fracture at both   the ileal and sacral aspects. This may reflect septic arthritis with   pathologic fractures at those sites. 2. Moderate body wall edema. CT CERVICAL SPINE WO CONTRAST   Final Result   No acute abnormality of the cervical spine. .      Multilevel degenerative disc disease and spurring         CT HEAD WO CONTRAST   Final Result   No acute intracranial abnormality. MRI LUMBAR SPINE W WO CONTRAST    (Results Pending)   MRI PELVIS W WO CONTRAST    (Results Pending)       IMPRESSION:  LEFT pathologic/likely septic sacroiliac joint fracture  IVDA  Principal Problem:    Acute metabolic encephalopathy  Active Problems:    Acute encephalopathy    Polysubstance abuse (HCC)    Acute cystitis without hematuria    Cellulitis of buttock    Sacral fracture, closed (HCC)    Hypoglycemia    Severe sepsis (HCC)    Unwitnessed fall    Pulmonary emphysema (HCC)    Mild bibasilar atelectasis    Amphetamine abuse (HCC)    Cocaine abuse (HCC)    Fentanyl use disorder, mild, abuse (HCC)    Screening for HIV (human immunodeficiency virus)    Chronic hepatitis C without hepatic coma (HCC)    Elevated C-reactive protein (CRP)    Elevated sed rate    Septic arthritis of sacroiliac joint ([address]    Fall    Bandemia    Tobacco abuse counseling  Resolved Problems:    * No resolved hospital problems. *      PLAN:  Ultimately, she will need to see [facility] to consider fixation of the SI joint.   Given her history this is not perceived to be acute and she is hemodynamically stable and not currently septic, so this is not urgent. Per IR review of the CT scan, there is no fluid able to be aspirated in the left SI joint.   - Awaiting MRI of the pelvis/lumbar spine.  - 50% WB with walker/crutches  - Pain control  - DVT prophylaxis: Lovenox per primary team  - Recreational drug use cessation  - Dispo: uncertain    FEMI Baker - CNP  9/15/2022  3:57 PM

## 2022-09-15 NOTE — PROGRESS NOTES
[address] [facility],[address] Msb Department   Phone: [phone]    Occupational Therapy    [x] Initial Evaluation            [] Daily Treatment Note         [] Discharge Summary      Patient: Mary Venegas   : 1975   MRN: [id number]   Date of Service:  9/15/2022    Admitting Diagnosis:  Acute metabolic encephalopathy  Current Admission Summary: Pt is a 43 yo female with a hx of Hep C and heroin abuse. The patient presents after a fall in a parking lot. She was admitted for acute encephalopathy, sepsis, and (L) buttock pain. Pelvic x-ray showed (L) SI erosive changes and comminuted fxs on both sides of the (L) SI joint. Ortho consulted - recommending 50% (L) LE NWB with walker or crutches. ID following pt. Past Medical History:  has a past medical history of Hepatitis C and History of heroin abuse ([facility] [address] Past Surgical History:  has a past surgical history that includes  section. Discharge Recommendations: Mary Venegas scored a 17/24 on the AM-PAC ADL Inpatient form. Current research shows that an AM-PAC score of 18 or greater is typically associated with a discharge to the patient's home setting. Based on the patient's AM-PAC score, and their current ADL deficits, it is recommended that the patient have 2-3 sessions per week of Occupational Therapy at d/c to increase the patient's independence. At this time, this patient demonstrates the endurance and safety to discharge home with home health services (home vs OP services) and a follow up treatment frequency of 2-3x/wk. Please see assessment section for further patient specific details. Patient may refuse this recommendations, patient with poor maintenance of weight bearing status and therapy needed for increased carryover and promoting safer return to PLOF.       HOME HEALTH CARE: LEVEL 3 SAFETY     - Initial home health evaluation to occur within 24-48 hours, in patient home   - Therapy evaluations in home within 24-48 hours of discharge; including DME and home safety   - Frontload therapy 5 days, then 3x a week   - Therapy to evaluate if patient has [address] needs for personal care   -  evaluation within 24-48 hours, includes evaluation of resources and insurance to determine [place], [place], LTC, and Medicaid options       If patient discharges prior to next session this note will serve as a discharge summary. Please see below for the latest assessment towards goals. DME Required For Discharge: bedside commode, reacher, fall alert device    Precautions/Restrictions: high fall risk, weight bearing  Weight Bearing Restrictions: partial weight bearing - 50 %  [] Right Upper Extremity  [] Left Upper Extremity [] Right Lower Extremity  [x] Left Lower Extremity     Required Braces/Orthotics: no braces required   [] Right  [] Left  Positional Restrictions:no positional restrictions      Pre-Admission Information     Lives With: significant other, .   Comment: works full time                  Type of Home: hotel  Home Layout: one level  Home Access: level entry  [facility]: tub/shower unit  [employer]: grab bars in shower  Toilet Height: standard [address]: rolling walker  Transfer Assistance: Independent without use of device  Ambulation Assistance:Independent without use of device  ADL Assistance: independent with all ADL's  IADL Assistance: independent with homemaking tasks, go out to eat for meals, the hotel has a laundry room  Active :        [x] Yes                 [] No  Hand Dominance: [] Left                 [x] Right  Current Employment: unemployed  Hobbies:   Recent Falls: 1 fall prior to admission, tripped      Examination     Vision:   Vision Gross Assessment: WFL  Hearing:   WFL  Perception:   WFL  Observation:   General Observation:  mild swelling to R forearm  Posture:   Fair  Sensation:   WFL  Proprioception:    WFL  Tone: Normotonic  Coordination Testing:   WFL    ROM:   (B) UE AROM WFL  Strength:   (B) UE strength grossly 3    Decision Making: medium complexity  Clinical Presentation: evolving      Subjective    General: Patient supine in bed upon arrival, agreeable to therapy evaluation with encouragement. Pain: 8/10. Location: L buttock  Pain Interventions: RN notified           Activities of Daily Living    Basic Activities of Daily Living  Grooming: stand by assistance requires verbal cueing Increased time to complete task  Grooming Comments: SBA for grooming tasks seated on BSC in front of sink, washed hair, applied deodorant, washed hair and brushed hair withincreased time needed for throroughness  Upper Extremity Bathing: stand by assistance  Bathing Equipment: none  Bathing Comments: SBA for washing chest, face, and arms with warm wipes. Upper Extremity Dressing: contact guard assistance requires verbal cueing Increased time to complete task  Dressing Equipment: none  Dressing Comments: CGA for donning/doffing gown  Toileting Comments: Catheter in place  General Comments: Patient with minimal verbal cueing needed for initiation, sequencing, and hand placement for increased time needed for completion. Patient with increased tongue movement through session during bathing tasks. Instrumental Activities of Daily Living  No IADL completed on this date. Functional Mobility    Bed Mobility  Supine to Sit: supervision  Sit to Supine: supervision  Rolling Left: supervision  Scooting: supervision  Comments: Patient with bed flat and with use of side rails  Transfers  Sit to stand transfer:contact guard assistance  Stand to sit transfer: contact guard assistance  Comments: Patient with minimal verbal cueing needed for hand placement for increased safety with functional transfers. Functional Mobility:  Sitting Balance: stand by assistance. Standing Balance: contact guard assistance.     Standing Balance Comment: Assessment:  patient verbalizes understanding, would benefit from continued reinforcement    Assessment    Activity Tolerance: Patient tolerated treatment well, /79 HR 86 and second BP [id number]. Impairments Requiring Therapeutic Intervention: decreased functional mobility, decreased ADL status, decreased strength, decreased safety awareness, decreased cognition, decreased endurance, decreased balance, decreased IADL, decreased coordination, increased pain  Prognosis: fair  Clinical Assessment: Patient presents with the above deficits impacting occupational performance and functioning below baseline, skilled OT services needed to address deficits to facilitate safe return to PLOF.     Safety Interventions: patient left in bed, bed alarm in place, call light within reach, gait belt, patient at risk for falls, telesitter in use, nurse notified, and seizure mats in use       Plan    Frequency: 3-5 x/per week  Current Treatment Recommendations: strengthening, balance training, functional mobility training, transfer training, endurance training, patient/caregiver education, ADL/self-care training, IADL training, home management training, pain management, home exercise program, and safety education    Goals    Patient Goals: Patient did not state     Short Term Goals:  Time Frame: Discharge  Patient will complete lower body ADL at modified independent while maintaining WB precautions  Patient will complete toileting at modified independent while maintaining WB precautions  Patient will complete functional transfers at modified independent while maintaining WB precautions  Patient will complete functional mobility at modified independent while maintaining WB precautions       Therapy Session Time     Individual Group Co-treatment   Time In 1102      Time Out 1155      Minutes 53           Timed Code Treatment Minutes:   38 minutes    Total Treatment Minutes:  53 minutes     Electronically Signed By: Rashid Pollock OTR/L [id number]

## 2022-09-15 NOTE — PLAN OF CARE
Problem: Hematologic - Adult  Goal: Maintains hematologic stability  Outcome: Progressing     Problem: Anxiety  Goal: Will report anxiety at manageable levels  Description: INTERVENTIONS:  1. Administer medication as ordered  2. Teach and rehearse alternative coping skills  3. Provide emotional support with 1:1 interaction with staff  Outcome: Progressing     Problem: Pain  Goal: Verbalizes/displays adequate comfort level or baseline comfort level  Outcome: Progressing     Vitals:    09/14/22 2353   BP: 130/79   Pulse: 75   Resp: 16   Temp: 97.9 °F (36.6 °C)   SpO2: 96%     VSS. Pt c/o pain tonight in back/sacrum, declined Tylenol, and agreeable to Ultram. COWS score 11 with anxiety noted. Gave 50mg Ultram at 0014 and Neurontin per pt request for sleep. See [facility] & all flowsheets. Will continue to monitor.

## 2022-09-16 LAB
A/G RATIO: 0.6 (ref 1.1–2.2)
ALBUMIN SERPL-MCNC: 2.6 G/DL (ref 3.4–5)
ALP BLD-CCNC: 80 U/L (ref 40–129)
ALT SERPL-CCNC: 26 U/L (ref 10–40)
ANION GAP SERPL CALCULATED.3IONS-SCNC: 9 MMOL/L (ref 3–16)
AST SERPL-CCNC: 51 U/L (ref 15–37)
BASOPHILS ABSOLUTE: 0.1 K/UL (ref 0–0.2)
BASOPHILS RELATIVE PERCENT: 1.2 %
BILIRUB SERPL-MCNC: <0.2 MG/DL (ref 0–1)
BLOOD CULTURE, ROUTINE: NORMAL
BUN BLDV-MCNC: 8 MG/DL (ref 7–20)
CALCIUM SERPL-MCNC: 8.9 MG/DL (ref 8.3–10.6)
CHLORIDE BLD-SCNC: 109 MMOL/L (ref 99–110)
CO2: 23 MMOL/L (ref 21–32)
CREAT SERPL-MCNC: 0.6 MG/DL (ref 0.6–1.1)
CULTURE, BLOOD 2: NORMAL
CULTURE, BLOOD 3: NORMAL
EOSINOPHILS ABSOLUTE: 0.1 K/UL (ref 0–0.6)
EOSINOPHILS RELATIVE PERCENT: 1.9 %
GFR AFRICAN AMERICAN: >60
GFR NON-AFRICAN AMERICAN: >60
GLUCOSE BLD-MCNC: 84 MG/DL (ref 70–99)
HCT VFR BLD CALC: 34 % (ref 36–48)
HCV QNT BY NAAT IU/ML: ABNORMAL IU/ML
HCV QNT BY NAAT LOG IU/ML: 6.6 LOG IU/ML
HEMOGLOBIN: 10.9 G/DL (ref 12–16)
INTERPRETATION: DETECTED
LYMPHOCYTES ABSOLUTE: 2.1 K/UL (ref 1–5.1)
LYMPHOCYTES RELATIVE PERCENT: 37.1 %
MCH RBC QN AUTO: 28.7 PG (ref 26–34)
MCHC RBC AUTO-ENTMCNC: 32.1 G/DL (ref 31–36)
MCV RBC AUTO: 89.5 FL (ref 80–100)
MONOCYTES ABSOLUTE: 0.4 K/UL (ref 0–1.3)
MONOCYTES RELATIVE PERCENT: 7.1 %
NEUTROPHILS ABSOLUTE: 3 K/UL (ref 1.7–7.7)
NEUTROPHILS RELATIVE PERCENT: 52.7 %
PDW BLD-RTO: 16.6 % (ref 12.4–15.4)
PLATELET # BLD: 457 K/UL (ref 135–450)
PMV BLD AUTO: 6.6 FL (ref 5–10.5)
POTASSIUM REFLEX MAGNESIUM: 3.9 MMOL/L (ref 3.5–5.1)
RBC # BLD: 3.8 M/UL (ref 4–5.2)
SODIUM BLD-SCNC: 141 MMOL/L (ref 136–145)
TOTAL PROTEIN: 6.8 G/DL (ref 6.4–8.2)
WBC # BLD: 5.7 K/UL (ref 4–11)

## 2022-09-16 PROCEDURE — 6360000002 HC RX W HCPCS: Performed by: INTERNAL MEDICINE

## 2022-09-16 PROCEDURE — 2060000000 HC ICU INTERMEDIATE R&B

## 2022-09-16 PROCEDURE — 6370000000 HC RX 637 (ALT 250 FOR IP): Performed by: INTERNAL MEDICINE

## 2022-09-16 PROCEDURE — 97535 SELF CARE MNGMENT TRAINING: CPT

## 2022-09-16 PROCEDURE — 97116 GAIT TRAINING THERAPY: CPT

## 2022-09-16 PROCEDURE — 99233 SBSQ HOSP IP/OBS HIGH 50: CPT | Performed by: INTERNAL MEDICINE

## 2022-09-16 PROCEDURE — 97110 THERAPEUTIC EXERCISES: CPT

## 2022-09-16 PROCEDURE — 36415 COLL VENOUS BLD VENIPUNCTURE: CPT

## 2022-09-16 PROCEDURE — 80053 COMPREHEN METABOLIC PANEL: CPT

## 2022-09-16 PROCEDURE — 2580000003 HC RX 258: Performed by: INTERNAL MEDICINE

## 2022-09-16 PROCEDURE — 85025 COMPLETE CBC W/AUTO DIFF WBC: CPT

## 2022-09-16 PROCEDURE — 97530 THERAPEUTIC ACTIVITIES: CPT

## 2022-09-16 RX ORDER — TRAMADOL HYDROCHLORIDE 50 MG/1
50 TABLET ORAL PRN
Status: DISCONTINUED | OUTPATIENT
Start: 2022-09-16 | End: 2022-09-18

## 2022-09-16 RX ORDER — CIPROFLOXACIN 500 MG/1
500 TABLET, FILM COATED ORAL EVERY 12 HOURS SCHEDULED
Status: DISCONTINUED | OUTPATIENT
Start: 2022-09-16 | End: 2022-09-21 | Stop reason: HOSPADM

## 2022-09-16 RX ORDER — LACTOBACILLUS RHAMNOSUS GG 10B CELL
1 CAPSULE ORAL 2 TIMES DAILY WITH MEALS
Status: DISCONTINUED | OUTPATIENT
Start: 2022-09-16 | End: 2022-09-21 | Stop reason: HOSPADM

## 2022-09-16 RX ADMIN — TRAMADOL HYDROCHLORIDE 50 MG: 50 TABLET ORAL at 16:54

## 2022-09-16 RX ADMIN — TRAMADOL HYDROCHLORIDE 50 MG: 50 TABLET ORAL at 04:52

## 2022-09-16 RX ADMIN — VANCOMYCIN HYDROCHLORIDE 1000 MG: 1 INJECTION, POWDER, LYOPHILIZED, FOR SOLUTION INTRAVENOUS at 14:17

## 2022-09-16 RX ADMIN — METHOCARBAMOL TABLETS 750 MG: 500 TABLET, COATED ORAL at 22:12

## 2022-09-16 RX ADMIN — DOXYCYCLINE HYCLATE 100 MG: 100 TABLET, COATED ORAL at 08:39

## 2022-09-16 RX ADMIN — METHOCARBAMOL TABLETS 750 MG: 500 TABLET, COATED ORAL at 04:52

## 2022-09-16 RX ADMIN — TRAMADOL HYDROCHLORIDE 50 MG: 50 TABLET ORAL at 07:44

## 2022-09-16 RX ADMIN — TRAMADOL HYDROCHLORIDE 50 MG: 50 TABLET ORAL at 22:17

## 2022-09-16 RX ADMIN — HYDROXYZINE PAMOATE 50 MG: 25 CAPSULE ORAL at 11:29

## 2022-09-16 RX ADMIN — GABAPENTIN 300 MG: 300 CAPSULE ORAL at 22:12

## 2022-09-16 RX ADMIN — HYDROXYZINE PAMOATE 50 MG: 25 CAPSULE ORAL at 20:21

## 2022-09-16 RX ADMIN — TRAMADOL HYDROCHLORIDE 50 MG: 50 TABLET ORAL at 02:29

## 2022-09-16 RX ADMIN — GABAPENTIN 300 MG: 300 CAPSULE ORAL at 12:53

## 2022-09-16 RX ADMIN — Medication 10 ML: at 22:13

## 2022-09-16 RX ADMIN — LAMOTRIGINE 100 MG: 100 TABLET ORAL at 08:39

## 2022-09-16 RX ADMIN — METHOCARBAMOL TABLETS 750 MG: 500 TABLET, COATED ORAL at 16:55

## 2022-09-16 RX ADMIN — TRAZODONE HYDROCHLORIDE 50 MG: 50 TABLET ORAL at 22:12

## 2022-09-16 RX ADMIN — CIPROFLOXACIN 500 MG: 500 TABLET, FILM COATED ORAL at 20:21

## 2022-09-16 RX ADMIN — GABAPENTIN 300 MG: 300 CAPSULE ORAL at 04:52

## 2022-09-16 RX ADMIN — TRAMADOL HYDROCHLORIDE 50 MG: 50 TABLET ORAL at 14:10

## 2022-09-16 RX ADMIN — METHOCARBAMOL TABLETS 750 MG: 500 TABLET, COATED ORAL at 11:29

## 2022-09-16 RX ADMIN — TRAMADOL HYDROCHLORIDE 50 MG: 50 TABLET ORAL at 20:21

## 2022-09-16 RX ADMIN — Medication 10 ML: at 08:39

## 2022-09-16 RX ADMIN — Medication 1 CAPSULE: at 18:39

## 2022-09-16 RX ADMIN — TRAMADOL HYDROCHLORIDE 50 MG: 50 TABLET ORAL at 12:07

## 2022-09-16 ASSESSMENT — ENCOUNTER SYMPTOMS
DIARRHEA: 0
EYE DISCHARGE: 0
SINUS PAIN: 0
SHORTNESS OF BREATH: 0
WHEEZING: 0
CONSTIPATION: 0
RHINORRHEA: 0
NAUSEA: 0
EYE REDNESS: 0
SINUS PRESSURE: 0
SORE THROAT: 0
BACK PAIN: 0
ABDOMINAL PAIN: 0
COUGH: 0

## 2022-09-16 ASSESSMENT — PAIN DESCRIPTION - LOCATION
LOCATION: PELVIS;GENERALIZED
LOCATION: BUTTOCKS;BACK
LOCATION: BUTTOCKS;BACK
LOCATION: BACK;BUTTOCKS

## 2022-09-16 ASSESSMENT — PAIN DESCRIPTION - DESCRIPTORS
DESCRIPTORS: THROBBING

## 2022-09-16 ASSESSMENT — PAIN SCALES - GENERAL
PAINLEVEL_OUTOF10: 8
PAINLEVEL_OUTOF10: 7
PAINLEVEL_OUTOF10: 7
PAINLEVEL_OUTOF10: 5
PAINLEVEL_OUTOF10: 8
PAINLEVEL_OUTOF10: 6
PAINLEVEL_OUTOF10: 7
PAINLEVEL_OUTOF10: 6

## 2022-09-16 ASSESSMENT — PAIN SCALES - WONG BAKER: WONGBAKER_NUMERICALRESPONSE: 0

## 2022-09-16 ASSESSMENT — PAIN DESCRIPTION - ORIENTATION
ORIENTATION: RIGHT;LEFT;POSTERIOR
ORIENTATION: POSTERIOR;RIGHT;LEFT
ORIENTATION: RIGHT;LEFT;POSTERIOR

## 2022-09-16 NOTE — PROGRESS NOTES
[address] [facility],6[address] Msb Department   Phone: [phone]    Occupational Therapy    [] Initial Evaluation            [x] Daily Treatment Note         [] Discharge Summary      Patient: Maricruz Merida   : 1975   MRN: [id number]   Date of Service:  2022    Admitting Diagnosis:  Acute metabolic encephalopathy  Current Admission Summary: Pt is a 41 yo female with a hx of Hep C and heroin abuse. The patient presents after a fall in a parking lot. She was admitted for acute encephalopathy, sepsis, and (L) buttock pain. Pelvic x-ray showed (L) SI erosive changes and comminuted fxs on both sides of the (L) SI joint. Ortho consulted - recommending 50% (L) LE NWB with walker or crutches. ID following pt. Past Medical History:  has a past medical history of Hepatitis C and History of heroin abuse ([facility] [address] Past Surgical History:  has a past surgical history that includes  section. Discharge Recommendations: Maricruz Merida scored a 17/24 on the AM-PAC ADL Inpatient form. Current research shows that an AM-PAC score of 18 or greater is typically associated with a discharge to the patient's home setting. Based on the patient's AM-PAC score, and their current ADL deficits, it is recommended that the patient have 2-3 sessions per week of Occupational Therapy at d/c to increase the patient's independence. At this time, this patient demonstrates the endurance and safety to discharge home with home health services (home vs OP services) and a follow up treatment frequency of 2-3x/wk. Please see assessment section for further patient specific details. Patient may refuse this recommendations, patient with poor maintenance of weight bearing status and therapy needed for increased carryover and promoting safer return to PLOF.       HOME HEALTH CARE: LEVEL 3 SAFETY     - Initial home health evaluation to occur within 24-48 hours, in patient home   - Therapy repositioned in chair and BLE lowered in chair, pt reported 0/10 pain            Activities of Daily Living    Basic Activities of Daily Living  Grooming: setup assistance supervision . Comment: pt completed grooming seated in recliner with set up / sup   Grooming Comments: Pt washed hair, dried and combed and completed oral hygiene  Upper Extremity Bathing: setup assistance  Instrumental Activities of Daily Living  No IADL completed on this date. Functional Mobility    Bed Mobility  Sit to Supine: supervision  Comments: Patient with bed flat and with use of side rails  Transfers  Sit to stand transfer:stand by assistance  Stand to sit transfer: stand by assistance  Comments: Patient with minimal verbal cues to maintain LLE 50% WB. Functional Mobility:  Sitting Balance: supervision. Standing Balance: stand by assistance, contact guard assistance.     Standing Balance Comment: Functional transfers, functional mobility  Functional Mobility: .  contact guard assistance  Functional Mobility Activity: in room pt walked to door from recliner, then from door to window  Functional Mobility Device Use: rolling walker  Functional Mobility Comment: Patient with CGA with use of RW for support, patient with some difficulty maintaining 50 % weight bearing, min verbal cues needed for pt to maintain with mobility and standing      Other Therapeutic Interventions      Functional Outcomes  -PAC Inpatient Daily Activity Raw Score: 17      Cognition  Overall Cognitive Status: Impaired  Arousal/Alterness: delayed responses to stimuli, inconsistent responses to stimuli  Following Commands: follows one step commands with repetition, follows one step commands with increased time  Attention Span: attends with cues to redirect  Memory: decreased recall of precautions, decreased short term memory  Safety Judgement: decreased awareness of need for assistance, decreased awareness of need for safety  Problem Solving: assistance required to generate solutions, assistance required to implement solutions, assistance required to identify errors made, assistance required to correct errors made  Insights: decreased awareness of deficits  Initiation: requires cues for some  Sequencing: requires cues for some  Comments: Patient with flat affect at times, patient with anxiousness/tearfulness throughout and increased encouragement needed for participation. 9/16 Pt was alert and oriented. She reported feeling more relaxed and was cooperative with all treatment activities/    Orientation:    alert and oriented x 4  Command Following:   [facility]     Education    Barriers To Learning: cognition and emotional  Patient Education: patient educated on goals, OT role and benefits, plan of care, precautions, ADL adaptive strategies, weight-bearing education, adaptive device training, energy conservation, disease specific education, transfer training, discharge recommendations  Learning Assessment:  patient verbalizes understanding, would benefit from continued reinforcement    Assessment    Activity Tolerance: Patient tolerated treatment well, she had fair standing tolerance,  was able to stand 5 min without pain/fatigue min verbal cues for LLE 50% WB  Impairments Requiring Therapeutic Intervention: decreased functional mobility, decreased ADL status, decreased strength, decreased safety awareness, decreased cognition, decreased endurance, decreased balance, decreased IADL, decreased coordination, increased pain  Prognosis: fair  Clinical Assessment: Patient presents with the above deficits impacting occupational performance and functioning below baseline, skilled OT services needed to address deficits to facilitate safe return to OF.     Safety Interventions: patient left in bed, bed alarm in place, call light within reach, patient at risk for falls, telesitter in use, and nurse notified       Plan    Frequency: 3-5 x/per week  Current Treatment

## 2022-09-16 NOTE — CONSULTS
Clinical Pharmacy Note: Pharmacy to Dose Vancomycin    Tsering Mcgowan is a 42 y.o. female started on Vancomycin per ID; consult received from Dr. Lynsey Carlisle to manage therapy. Also receiving the following antibiotics: PO cipro. Goal AUC: 400-600 mg/L*hr  Goal Trough Level: 15-20 mcg/mL    Assessment/Plan:  Patient was on 1000 mg q21 during this admission. Will re-initiate vancomycin at that dose. Initiate vancomycin 1000 mg IV every 12 hours. A vancomycin random level has been ordered for 9/18 at 0600  Changes in regimen will be determined based on culture results, renal function, and clinical response. Pharmacy will continue to monitor and adjust regimen as necessary. Allergies:  Penicillins     Recent Labs     09/14/22  0408 09/16/22  0439   CREATININE <0.5* 0.6       Recent Labs     09/14/22  0408 09/16/22  0439   WBC 9.9 5.7       Ht Readings from Last 1 Encounters:   09/13/22 5' (1.524 m)        Wt Readings from Last 1 Encounters:   09/16/22 95 lb 9.6 oz (43.4 kg)         Estimated Creatinine Clearance: 82 mL/min (based on SCr of 0.6 mg/dL).       Thank you for the consult,    Debi Mayfield, PharmD, [facility]

## 2022-09-16 NOTE — ACP (ADVANCE CARE PLANNING)
Advanced Care Planning Note. Purpose of Encounter: Advanced care planning in light of acute metabolic encephalopathy  Parties In Attendance: Patient  Decisional Capacity: Yes  Subjective: Patient with R eye vision change  Objective: Cr 0.6  Goals of Care Determination: Patient wants full support (CPR, vent, surgery, HD, trach, PEG)  Plan:  Abx. MRI Brain. NS consult. Ortho and ID consults  Code Status: Full code   Time spent on Advanced care Plannin minutes  Advanced Care Planning Documents: Completed advanced directives on chart, boyfriend is the POA.     Arelis Sorenson MD  2022 10:33 AM

## 2022-09-16 NOTE — PROGRESS NOTES
[facility]   Phone: [phone]    Physical Therapy    [] Initial Evaluation            [x] Daily Treatment Note         [] Discharge Summary      Patient: Karlos Denver   : 1975   MRN: [id number]   Date of Service:  2022  Admitting Diagnosis: Acute metabolic encephalopathy  Current Admission Summary: Pt is a 43 yo female with a hx of Hep C and heroin abuse. The patient presents after a fall in a parking lot. She was admitted for acute encephalopathy, sepsis, and (L) buttock pain. Pelvic x-ray showed (L) SI erosive changes and comminuted fxs on both sides of the (L) SI joint. Ortho consulted - recommending 50% (L) LE NWB with walker or crutches. ID following pt. Past Medical History:  has a past medical history of Hepatitis C and History of heroin abuse ([facility] [address] Past Surgical History:  has a past surgical history that includes  section. Discharge Recommendations: Karlos Denver scored a 20/24 on the AM-PAC short mobility form. Current research shows that an AM-PAC score of 18 or greater is typically associated with a discharge to the patient's home setting. Based on the patient's AM-PAC score and their current functional mobility deficits, it is recommended that the patient have 2-3 sessions per week of Physical Therapy at d/c to increase the patient's independence. At this time, this patient demonstrates the endurance and safety to discharge home with home PT and a follow up treatment frequency of 2-3x/wk. Please see assessment section for further patient specific details. Pt currently declining [facility] however would benefit to promote consistency with [place] precautions and safety.      HOME HEALTH CARE: LEVEL 1 STANDARD    - Initial home health evaluation to occur within 24-48 hours, in patient home   - Therapy to evaluate with goal of regaining prior level of functioning   - Therapy to evaluate if patient has [id number] needs for personal care    If patient discharges prior to next session this note will serve as a discharge summary. Please see below for the latest assessment towards goals. DME Required For Discharge: rolling walker (pt states she has one, however is not sure it came with her to the ED)    Precautions/Restrictions: high fall risk, weight bearing  Weight Bearing Restrictions: partial weight bearing - 50 %  [] Right Upper Extremity  [] Left Upper Extremity [] Right Lower Extremity  [x] Left Lower Extremity     Required Braces/Orthotics: no braces required   [] Right  [] Left  Positional Restrictions:no positional restrictions    Pre-Admission Information   Lives With: significant other, .   Comment: works full time     Type of Home: hotel  Home Layout: one level  Home Access: level entry  [facility]/shower [facility]  [employer]: grab bars in shower  Toilet Height: standard [address]: rolling walker  Transfer Assistance: Independent without use of device  Ambulation Assistance:Independent without use of device  ADL Assistance: independent with all ADL's  IADL Assistance: independent with homemaking tasks, go out to eat for meals, the hotel has a laundry room  Active :        [x] Yes  [] No  Hand Dominance: [] Left  [x] Right  Current Employment: unemployed  Hobbies:   Recent Falls: 1 fall prior to admission, tripped    Examination   Vision:   Vision Gross Assessment: WFL  Hearing:   WFL  Observation:   General Observation:  mild swelling to (R) forearm  Posture:   Fair - forward head, rounded shoulders  Sensation:   WFL  Proprioception:    WFL  ROM:   (B) LE AROM WFL  Strength:   (L) Hip: deferred 2/2 pain        (R) Hip flex: 5  (L) Knee: deferred 2/2 pain    (R) Knee flex/ext: 5  (L) Ankle: deferred 2/2 pain   (R) Ankle DF/PF: 5  Decision Making: medium complexity  Clinical Presentation: evolving      Subjective  General: Pt supine in bed upon arrival and agreeable to working with PT. Pt reports feeling much better, denies pain at rest. Wanting to use bathroom. Also denied pain after activity. Pain: 0/10  Pain Interventions: repositioned        Functional Mobility  Bed Mobility  Supine to Sit: supervision  Scooting: supervision  Comments: Pt sat EOB with good balance. Transfers  Sit to stand transfer: stand by assistance  Stand to sit transfer: stand by assistance  Toilet transfer: stand by assistance  Comments: VCs for hand placement, transfers from EOB, toilet, and chair. Ambulation  Surface:level surface  Assistive Device: rolling walker  Assistance: stand by assistance  Distance: 15 ft, 10 ft, 100 ft  Gait Mechanics: Step-to pattern, decreased step length  Comments:  Pt given VCs to maintain 50% WB L LE. Amb to bathroom and urinated, performed her own hygiene. Bathed and gown and brief changed while seated in toilet. Pt, stood at sink for ADLs with good standing balance. Pt sat to rest before amb in hallway. Returned to recliner chair and positioned for comfort, needs placed in reach. Stair Mobility  Stair mobility not completed on this date. Comments:  Wheelchair Mobility:  No w/c mobility completed on this date. Comments:  Balance  Static Sitting Balance: good: independent with functional balance in unsupported position  Dynamic Sitting Balance: good: independent with functional balance in unsupported position  Static Standing Balance: fair (-): maintains balance at SBA with use of UE support  Dynamic Standing Balance: fair (-): maintains balance at CGA with use of UE support  Comments: Pt consistently uses (B) UE support on RW for all standing balance, minimal cues need to maintain 50% (L) LE WBing in standing. Other Therapeutic Interventions  Toileting, bathing, gown and brief change. Pt able to clean herself with wipes, assisted with back.  Pt stood at sink for hand washing and brushing teeth, attempting combing hair but extremely matted and tangled, OT notified of pt desire to address hair issues. Pt performed seated there ex including AP x 10 bilat, LAQs 2x10 bilat, marching x 10 bilat. Functional Outcomes  AM-PAC Inpatient Mobility Raw Score : 20              Cognition  Overall Cognitive Status: WFL  Arousal/Alterness: appropriate responses to stimuli  Following Commands: follows all commands without difficulty  Attention Span: attends with cues to redirect, difficulty dividing attention  Memory: appears intact  Safety Judgement: decreased awareness of need for safety  Problem Solving: decreased awareness of errors, assistance required to identify errors made  Insights: decreased awareness of deficits  Initiation: requires cues for some  Sequencing: requires cues for some  Comments: Pt with increased anxiety initially however seems to calm throughout session. Questionable carry over with education provided. Orientation:    alert and oriented x 4  Command Following:   [facility]    Education  Barriers To Learning: cognition and emotional  Patient Education: patient educated on goals, PT role and benefits, plan of care, precautions, weight-bearing education, general safety, functional mobility training, proper use of assistive device/equipment, disease specific education, transfer training, discharge recommendations  Learning Assessment:  patient verbalizes understanding, would benefit from continued reinforcement, patient will require reinforcement due to cognitive deficits    Assessment  Activity Tolerance: Pt tolerated treatment session well. Impairments Requiring Therapeutic Intervention: decreased functional mobility, decreased ADL status, decreased strength, decreased endurance, decreased balance, decreased IADL, increased pain, decreased posture  Prognosis: good  Clinical Assessment: Pt needing supervision for bed mob, SBA for tranfers and gait with RW.  Pt is a 41 yo female admitted to [facility] for acute encephalopathy and sepsis, found to have (L) sacral and ilieal comminuted fxs. At this time the patient requires constant cues and visual feedback to consistently maintain (L) LE 50% WBing, however the patient is able to ambulate household distances. She demonstrated minimal impact from pain during the session. Recommending continued skilled PT to safely progress independence with functional mobility, however the patient is resistive to further therapy outside of acute care. Her hx of drug use will likely result in longer healing time for the fractures. Per ortho there is a possibility she will need transferred to [facility] for pelvic stabilization.    Safety Interventions: patient left in bed, bed alarm in place, call light within reach, gait belt, patient at risk for falls, telesitter in use, nurse notified, and seizure mats in use    Plan  Frequency: 5-7 x/week  Current Treatment Recommendations: strengthening, balance training, functional mobility training, transfer training, gait training, endurance training, patient/caregiver education, pain management, home exercise program, safety education, equipment evaluation/education, and positioning    Goals  Patient Goals: Go home   Short Term Goals:  Time Frame: Before discharge  Patient will complete bed mobility at Independent   Patient will complete transfers at modified independent while maintaining 50% (L)  [facility]   Patient will ambulate 50 ft with use of rolling walker at modified independent while maintaining 50% (L) LE WBing   Patient to maintain standing at modified independent for 10 minutes while maintaining 50% (L) LE WBing in order to complete ADLs  **No goals met in full this date 916    Therapy Session Time      Individual Group Co-treatment   Time In [place] 45       Time Out 1002       Minutes 45         Total Treatment Minutes:  45 minutes       Electronically Signed By: MAURICE [id number]

## 2022-09-16 NOTE — PLAN OF CARE
Problem: Discharge Planning  Goal: Discharge to home or other facility with appropriate resources  Outcome: Progressing  Flowsheets  Taken 9/16/2022 0052  Discharge to home or other facility with appropriate resources:   Identify barriers to discharge with patient and caregiver   Arrange for needed discharge resources and transportation as appropriate   Identify discharge learning needs (meds, wound care, etc)  Taken 9/15/2022 2030  Discharge to home or other facility with appropriate resources:   Identify barriers to discharge with patient and caregiver   Arrange for needed discharge resources and transportation as appropriate   Identify discharge learning needs (meds, wound care, etc)     Problem: Pain  Goal: Verbalizes/displays adequate comfort level or baseline comfort level  Outcome: Progressing  Flowsheets (Taken 9/16/2022 0052)  Verbalizes/displays adequate comfort level or baseline comfort level:   Administer analgesics based on type and severity of pain and evaluate response   Assess pain using appropriate pain scale     Problem: Skin/Tissue Integrity  Goal: Absence of new skin breakdown  Description: 1. Monitor for areas of redness and/or skin breakdown  2. Assess vascular access sites hourly  3. Every 4-6 hours minimum:  Change oxygen saturation probe site  4. Every 4-6 hours:  If on nasal continuous positive airway pressure, respiratory therapy assess nares and determine need for appliance change or resting period. Outcome: Progressing  Note: No new skin breakdown or new reddened bony prominences this shift. Pt repositions self. Will keep skin clean and dry, encourage ambulation and provide adequate hydration.          Problem: Safety - Adult  Goal: Free from fall injury  Outcome: Progressing  Flowsheets (Taken 9/16/2022 0052)  Free From Fall Injury: Instruct family/caregiver on patient safety     Problem: ABCDS Injury Assessment  Goal: Absence of physical injury  Outcome: Progressing  Flowsheets (Taken 9/16/2022 0052)  Absence of Physical Injury: Implement safety measures based on patient assessment     Problem: Neurosensory - Adult  Goal: Achieves stable or improved neurological status  Outcome: Progressing  Flowsheets  Taken 9/16/2022 0052  Achieves stable or improved neurological status: Assess for and report changes in neurological status  Taken 9/15/2022 2030  Achieves stable or improved neurological status: Assess for and report changes in neurological status     Problem: Skin/Tissue Integrity - Adult  Goal: Skin integrity remains intact  Outcome: Progressing  Flowsheets (Taken 9/16/2022 0052)  Skin Integrity Remains Intact: Monitor for areas of redness and/or skin breakdown     Problem: Musculoskeletal - Adult  Goal: Return mobility to safest level of function  Outcome: Progressing  Flowsheets  Taken 9/16/2022 0052  Return Mobility to Safest Level of Function: Assess patient stability and activity tolerance for standing, transferring and ambulating with or without assistive devices  Taken 9/15/2022 2030  Return Mobility to Safest Level of Function:   Assess patient stability and activity tolerance for standing, transferring and ambulating with or without assistive devices   Assist with transfers and ambulation using safe patient handling equipment as needed     Problem: Genitourinary - Adult  Goal: Absence of urinary retention  Outcome: Progressing  Flowsheets  Taken 9/16/2022 0052  Absence of urinary retention: Assess patients ability to void and empty bladder  Taken 9/15/2022 2030  Absence of urinary retention:   Assess patients ability to void and empty bladder   Monitor intake/output and perform bladder scan as needed  Note: Pt up to bathroom almost every two hours. Problem: Anxiety  Goal: Will report anxiety at manageable levels  Description: INTERVENTIONS:  1. Administer medication as ordered  2. Teach and rehearse alternative coping skills  3.  Provide emotional support with 1:1 interaction with staff  Outcome: Progressing  Flowsheets (Taken 9/16/2022 0052)  Will report anxiety at manageable levels: Administer medication as ordered  Note: Atarax given per orders for anxiety and sleep. Problem: Hematologic - Adult  Goal: Maintains hematologic stability  Outcome: Progressing  Note: Pt vital signs will be assessed per floor protocol and as needed.  Medications will be administered accordingly to keep vitals signs within parameters that are normal or that are chosen by MD.

## 2022-09-16 NOTE — PROGRESS NOTES
Infectious Diseases   Progress Note      Admission Date: 9/12/2022  Hospital Day: Hospital Day: 5   Attending: Jennifer Oconnor MD  Date of service: 9/16/2022     Chief complaint/ Reason for consult:     Severe sepsis on admission with tachypnea, tachycardia, bandemia and acute metabolic encephalopathy on admission  Fall before admission leading to bilateral ileal and sacral aspect fractures of the left sacroiliac joint  Concern for pathologic fractures and left sacroiliac joint septic arthritis on CT imaging  Elevated sed rate and CRP  Bibasal atelectasis    Microbiology:        I have reviewed allavailable micro lab data and cultures    Blood culture (2/2) - collected on 9/12/2022: Negative  Urine culture  - collected on 9/12/2022: Negative      Antibiotics and immunizations:       Current antibiotics: All antibiotics and their doses were reviewed by me    Recent Abx Admin                     doxycycline hyclate (VIBRA-TABS) tablet 100 mg (mg) 100 mg Given 09/16/22 0839     100 mg Given 09/15/22 2058     100 mg Given  1318                      Immunization History: All immunization history was reviewed by me today. Immunization History   Administered Date(s) Administered    COVID-19, J&J, (age 18y+), IM, 0.5 mL 07/22/2021       Known drug allergies: All allergies were reviewed and updated    Allergies   Allergen Reactions    Penicillins Anaphylaxis       Social history:     Social History:  All social andepidemiologic history was reviewed and updated by me today as needed. Tobacco use:   reports that she has been smoking cigarettes. She has been smoking an average of 1 pack per day. She has never used smokeless tobacco.  Alcohol use:   reports that she does not currently use alcohol. Currently lives in: [facility]   reports current drug use. Drug: Opiates .      COVID VACCINATION AND LAB RESULT RECORDS:     Internal Administration   First Dose COVID-19, J&J, (age 18y+), IM, 0.5 mL  07/22/2021 Second Dose           Last COVID Lab No results found for: SARS-COV-2, SARS-COV-2 RNA, SARS-COV-2, SARS-COV-2, SARS-COV-2 BY PCR, SARS-COV-2, SARS-COV-2, SARS-COV-2         Assessment:     The patient is a 42 y.o. old female who  has a past medical history of Hepatitis C and History of heroin abuse ([facility] [address] with following problems:    Severe sepsis on admission with tachypnea, tachycardia, bandemia and acute metabolic encephalopathy on admission-this is resolved  Fall before admission leading to bilateral ileal and sacral aspect fractures of the left sacroiliac joint-MRI concerning for osteomyelitis  Concern for pathologic fractures and left sacroiliac joint septic arthritis on CT imaging-unclear if there is infection  Elevated sed rate and CRP-should improve slowly  Bibasal atelectasis  Chronic emphysema  IV drug user  Amphetamine and heroin abuse-counseling done  Fentanyl abuse  Cocaine abuse  Chronic hepatitis C  Need for HIV screen-this was negative on September 13      Discussion:      The patient is afebrile. She is on oral doxycycline. MRI of the pelvis without contrast has been completed yesterday. It did show acute septic arthritis/osteomyelitis of the left SI joint with abscess and myositis and pathological fracture of the left sacral vein. There was also concern for possible phlegmon extending into the epidural space of the sacral canal.    Her serum creatinine 0.6 today. White cell count is 5700. Platelet count is 540,391. Plan:     Diagnostic Workup:    Recommend biopsy of the left SI joint area by interventional radiology. Send specimen for bacterial, fungal and AFB culture and surgical pathology. Continue to follow  fever curve, WBC count and blood cultures. Continue to monitor blood counts, liver and renal function.     Antimicrobials:    Given the findings of MRI of the pelvis, I think we will have to switch her back to IV antibiotics  Will order IV vancomycin for her for empiric through fever etc.  Discussed patient's condition and what to expect. All of the patient's questions were addressed in a satisfactory manner and patient verbalized understanding all instructions. Level of complexity of visit and medical decision making: High     Risk of Complications/Morbidity: High     Illness(es)/ Infection present that pose threat to bodily function. There is potential for severe exacerbation of infection/side effects of treatment. Therapy requires intensive monitoring for antimicrobial agent toxicity. TIME SPENT TODAY:     - Spent over  37 minutes on visit (including interval history, physical exam, review of data including labs, cultures, imaging, development and implementation of treatment plan and coordination of complex care). More than 50 percent of this includes face-to-face time spent with the patient for counseling and coordination of care. Thank you for involving me in the care of your patient. I will continue to follow. If you have anyadditional questions, please do not hesitate to contact me. Subjective: Interval history: Interval history was obtained from chart review and patient/ RN. The patient is afebrile. She is tolerating antibiotics okay. No diarrhea     REVIEW OF SYSTEMS:      Review of Systems   Constitutional:  Negative for chills, diaphoresis and fever. HENT:  Negative for ear discharge, ear pain, postnasal drip, rhinorrhea, sinus pressure, sinus pain and sore throat. Eyes:  Negative for discharge and redness. Respiratory:  Negative for cough, shortness of breath and wheezing. Cardiovascular:  Negative for chest pain and leg swelling. Gastrointestinal:  Negative for abdominal pain, constipation, diarrhea and nausea. Endocrine: Negative for cold intolerance, heat intolerance and polydipsia. Genitourinary:  Negative for dysuria, flank pain, frequency, hematuria and urgency. Musculoskeletal:  Positive for arthralgias.  Negative for back pain and myalgias. Ongoing left SI joint area pain   Skin:  Negative for rash. Allergic/Immunologic: Negative for immunocompromised state. Neurological:  Negative for dizziness, seizures and headaches. Hematological:  Does not bruise/bleed easily. Psychiatric/Behavioral:  Negative for agitation, hallucinations and suicidal ideas. The patient is not nervous/anxious. All other systems reviewed and are negative. Past Medical History: All past medical history reviewed today. Past Medical History:   Diagnosis Date    Hepatitis C     History of heroin abuse ([facility] [address]     last used 2019       Past Surgical History: All past surgical history was reviewed today. Past Surgical History:   Procedure Laterality Date     SECTION         Family History: All family history was reviewed today. History reviewed. No pertinent family history. Objective:       PHYSICAL EXAM:      Vitals:   Vitals:    22 1114 22 1207 22 1230 22 1237   BP: 125/79  124/83    Pulse: 67  68    Resp:  16 16 16   Temp: 98.1 °F (36.7 °C)  98.1 °F (36.7 °C)    TempSrc: Oral  Oral    SpO2: 97%      Weight:       Height:           Physical Exam  Vitals and nursing note reviewed. Constitutional:       Appearance: She is well-developed. She is not diaphoretic. Comments: The patient was seen earlier today. HENT:      Head: Normocephalic and atraumatic. Right Ear: External ear normal. There is no impacted cerumen. Left Ear: External ear normal. There is no impacted cerumen. Nose: Nose normal.      Mouth/Throat:      Mouth: Mucous membranes are moist.      Pharynx: Oropharynx is clear. No oropharyngeal exudate. Eyes:      General: No scleral icterus. Right eye: No discharge. Left eye: No discharge. Conjunctiva/sclera: Conjunctivae normal.      Pupils: Pupils are equal, round, and reactive to light. Neck:      Thyroid: No thyromegaly. Cardiovascular:      Rate and Rhythm: Normal rate and regular rhythm. Heart sounds: Normal heart sounds. No murmur heard. No friction rub. Pulmonary:      Effort: No respiratory distress. Breath sounds: No stridor. No wheezing or rales. Abdominal:      General: Bowel sounds are normal.      Palpations: Abdomen is soft. Tenderness: There is no abdominal tenderness. There is no guarding or rebound. Musculoskeletal:         General: Tenderness present. No swelling or deformity. Normal range of motion. Cervical back: Normal range of motion and neck supple. Right lower leg: No edema. Left lower leg: No edema. Comments: Left SI joint area tenderness noted   Lymphadenopathy:      Cervical: No cervical adenopathy. Skin:     General: Skin is warm and dry. Coloration: Skin is not jaundiced. Findings: No bruising, erythema or rash. Neurological:      General: No focal deficit present. Mental Status: She is alert and oriented to person, place, and time. Mental status is at baseline. Motor: No abnormal muscle tone. Psychiatric:         Mood and Affect: Mood normal.         Behavior: Behavior normal.      *    Lines and drains: All vascular access sites are healthy with no local erythema, discharge or tenderness. Intake and output:    I/O last 3 completed shifts: In: 10 [I.V.:10]  Out: 4275 [Urine:4275]    Lab Data:   All available labs and old records have been reviewed by me.     CBC:  Recent Labs     09/14/22 0408 09/16/22 0439   WBC 9.9 5.7   RBC 3.56* 3.80*   HGB 10.1* 10.9*   HCT 30.6* 34.0*   * 457*   MCV 85.8 89.5   MCH 28.2 28.7   MCHC 32.9 32.1   RDW 16.6* 16.6*          BMP:  Recent Labs     09/14/22 0408 09/16/22 0439    141   K 3.4* 3.9    109   CO2 22 23   BUN 7 8   CREATININE <0.5* 0.6   CALCIUM 8.4 8.9   GLUCOSE 98 84          Hepatic Function Panel:   Lab Results   Component Value Date/Time    ALKPHOS 80 09/16/2022 04:39 AM    ALT 26 09/16/2022 04:39 AM    AST 51 09/16/2022 04:39 AM    PROT 6.8 09/16/2022 04:39 AM    BILITOT <0.2 09/16/2022 04:39 AM    LABALBU 2.6 09/16/2022 04:39 AM       CPK: No results found for: CKTOTAL  ESR:   Lab Results   Component Value Date    SEDRATE 66 (H) 09/13/2022     CRP:   Lab Results   Component Value Date    CRP 94.4 (H) 09/13/2022           Imaging: All pertinent images and reports for the current visit were reviewed by me during this visit. I reviewed the chest x-ray/CT scan/MRI images and independently interpreted the findings and results today. MRI PELVIS W WO CONTRAST   Final Result   Acute septic arthritis/osteomyelitis of the left sacroiliac joint, with   adjacent myositis. Pathologic fracture of the left sacral wing. Probable phlegmon extending into the epidural space of the sacral canal.         MRI LUMBAR SPINE W WO CONTRAST   Final Result   No acute abnormality of the L-spine identified. Partially visualized septic arthritis of the left sacroiliac joint. CT CHEST PULMONARY EMBOLISM W CONTRAST   Final Result   Negative examination for pulmonary embolism. Bibasilar dependent atelectasis. No focal infiltrates or pleural effusions. Emphysema and right apical subpleural bulla or blebs         CT ABDOMEN PELVIS W IV CONTRAST Additional Contrast? None   Final Result   1. Irregularity of the left SI joint with erosive change and fracture at both   the ileal and sacral aspects. This may reflect septic arthritis with   pathologic fractures at those sites. 2. Moderate body wall edema. CT CERVICAL SPINE WO CONTRAST   Final Result   No acute abnormality of the cervical spine. .      Multilevel degenerative disc disease and spurring         CT HEAD WO CONTRAST   Final Result   No acute intracranial abnormality. MRI BRAIN W WO CONTRAST    (Results Pending)       Medications: All current and past medications were reviewed.      doxycycline hyclate  100 mg Oral BID    sodium chloride flush  10 mL IntraVENous 2 times per day    lamoTRIgine  100 mg Oral Daily        sodium chloride         traMADol, methocarbamol, dicyclomine, gabapentin, hydrOXYzine pamoate, promethazine, traZODone, sodium chloride flush, sodium chloride, magnesium sulfate, promethazine **OR** ondansetron, acetaminophen **OR** acetaminophen      Problem list:       Patient Active Problem List   Diagnosis Code    Acute encephalopathy G93.40    Polysubstance abuse ([facility] [address] F19.10    Acute cystitis without hematuria N30.00    Cellulitis of buttock L03.317    Sacral fracture, closed ([address] S32.10XA    Hypoglycemia E16.2    Severe sepsis (HCC) A41.9, R65.20    Unwitnessed fall R29.6    Pulmonary emphysema ([address] J43.9    Mild bibasilar atelectasis J98.11    Amphetamine abuse ([facility] [address] F15.10    Cocaine abuse ([facility] [address] F14.10    Fentanyl use disorder, mild, abuse ([address] F11.10    Screening for HIV (human immunodeficiency virus) Z11.4    Chronic hepatitis C without hepatic coma ([facility] [address] B18.2    Elevated C-reactive protein (CRP) R79.82    Elevated sed rate R70.0    Septic arthritis of sacroiliac joint [facility]) M46.58    Fall W19. [place]    Bandemia K33.613    Acute metabolic encephalopathy G91.32    Tobacco abuse counseling Z71.6       Please note that this chart was generated using Dragon dictation software. Although every effort was made to ensure the accuracy of this automated transcription, some errors in transcription may have occurred inadvertently. If you may need any clarification, please do not hesitate to contact me through EPIC or at the phone number provided below with my electronic signature. Any pictures or media included in this note were obtained after taking informed verbal consent from the patient and with their approval to include those in the patient's medical record.         Yaron Acuna MD, MPH, FACP, FIDSA  9/16/2022, 1:16 PM  [facility] Infectious Disease   [id number] [facility], [address]  Office: [phone]  Fax: [phone]  In-person Clinic days:  Tuesday & Thursday a.m. Virtual clinic days: Monday, Wednesday & Friday a.m.

## 2022-09-16 NOTE — PROGRESS NOTES
0800-Assessment complete and charted. Patient alert and oriented, resting comfortably in bed. Patient c/o pain of 7 on 0-10 scale, patient remains 5-7 on COWS scale, tramadol given per protocol. Bed in lowest position, call light in reach, wheels livier      1400-Vancomycin administered as ordered.   Patient will be having bone biopsy on Monday and will NPO at midnight Sunday

## 2022-09-16 NOTE — PROGRESS NOTES
Hospitalist Progress Note      PCP: No primary care provider on file. Date of Admission: 9/12/2022    Chief Complaint:  [facility] Course: 43 yo F with Hep C, h/o cocaine abuse, fentanyl abuse, amphetamine abuse was brought to ER for AMS. Admitted as inpatient for acute metabolic encephalopathy, L sacroiliitis, sacral and ileal fractures and leukocytosis. Followed by ID and Ortho. For MRI Pelvis and L spine. Started on IV Abx. Echo:  Normal left ventricle size, wall thickness, and systolic function with an   estimated ejection fraction of 60%. No regional wall motion abnormalities are seen. Right atrial yuridia terminalis more prominent. No suggestion of vegetation. IR unable to aspirate SI joint as no fluid noted per IR attending. Changed to Doxycycine on 9/15. MRI Pelvis     Acute septic arthritis/osteomyelitis of the left sacroiliac joint, with   adjacent myositis. Pathologic fracture of the left sacral wing. Probable phlegmon extending into the epidural space of the sacral canal.      Neurosurgery consulted for sacral epidural phlegmon/abscess    MRI L spine:  No acute abnormality of the L-spine identified. Partially visualized septic arthritis of the left sacroiliac joint. Patient with R eye vision changes. MRI Brain requested    Subjective:  Patient with R eye vision changes. Sees white film from R eye. No CP, SOB, HA or abdominal pain. No fevers. Stable back pain.       Medications:  Reviewed    Infusion Medications    sodium chloride       Scheduled Medications    doxycycline hyclate  100 mg Oral BID    sodium chloride flush  10 mL IntraVENous 2 times per day    lamoTRIgine  100 mg Oral Daily     PRN Meds: methocarbamol, dicyclomine, gabapentin, hydrOXYzine pamoate, promethazine, traZODone, sodium chloride flush, sodium chloride, magnesium sulfate, promethazine **OR** ondansetron, acetaminophen **OR** acetaminophen      Intake/Output Summary (Last 24 hours) at 9/16/2022 1023  Last data filed at 9/15/2022 1518  Gross per 24 hour   Intake --   Output 1000 ml   Net -1000 ml         Physical Exam Performed:    /81   Pulse 68   Temp 98 °F (36.7 °C) (Oral)   Resp 16   Ht 5' (1.524 m)   Wt 95 lb 9.6 oz (43.4 kg)   LMP  (LMP Unknown)   SpO2 98%   BMI 18.67 kg/m²     General appearance: No apparent distress, appears stated age and cooperative. HEENT: Pupils equal, round, and reactive to light. Conjunctivae/corneas clear. Neck: Supple, with full range of motion. No jugular venous distention. Trachea midline. Respiratory:  Normal respiratory effort. Clear to auscultation, bilaterally without Rales/Wheezes/Rhonchi. Cardiovascular: Regular rate and rhythm with normal S1/S2 without murmurs, rubs or gallops. Abdomen: Soft, non-tender, non-distended with normal bowel sounds. Musculoskeletal: Tender L sacroiliac area. Skin: Skin color, texture, turgor normal.  No rashes or lesions. Neurologic:  Neurovascularly intact without any focal sensory/motor deficits. Cranial nerves: II-XII intact, grossly non-focal.  Psychiatric: Alert and oriented, thought content appropriate, normal insight  Capillary Refill: Brisk, 3 seconds, normal   Peripheral Pulses: +2 palpable, equal bilaterally       Labs:   Recent Labs     09/14/22  0408 09/16/22 0439   WBC 9.9 5.7   HGB 10.1* 10.9*   HCT 30.6* 34.0*   * 457*       Recent Labs     09/14/22 0408 09/16/22 0439    141   K 3.4* 3.9    109   CO2 22 23   BUN 7 8   CREATININE <0.5* 0.6   CALCIUM 8.4 8.9       Recent Labs     09/14/22 0408 09/16/22 0439   AST 49* 51*   ALT 21 26   BILITOT 0.4 <0.2   ALKPHOS 72 80       No results for input(s): INR in the last 72 hours. No results for input(s): Kristin Kras in the last 72 hours.       Urinalysis:      Lab Results   Component Value Date/Time    NITRU Negative 09/12/2022 06:31 PM    WBCUA 29 09/12/2022 06:31 PM    BACTERIA Rare 09/12/2022 06:31 PM RBCUA 2 09/12/2022 06:31 PM    BLOODU Negative 09/12/2022 06:31 PM    SPECGRAV 1.020 09/12/2022 06:31 PM    GLUCOSEU Negative 09/12/2022 06:31 PM       Radiology:  MRI PELVIS W WO CONTRAST   Final Result   Acute septic arthritis/osteomyelitis of the left sacroiliac joint, with   adjacent myositis. Pathologic fracture of the left sacral wing. Probable phlegmon extending into the epidural space of the sacral canal.         MRI LUMBAR SPINE W WO CONTRAST   Final Result   No acute abnormality of the L-spine identified. Partially visualized septic arthritis of the left sacroiliac joint. CT CHEST PULMONARY EMBOLISM W CONTRAST   Final Result   Negative examination for pulmonary embolism. Bibasilar dependent atelectasis. No focal infiltrates or pleural effusions. Emphysema and right apical subpleural bulla or blebs         CT ABDOMEN PELVIS W IV CONTRAST Additional Contrast? None   Final Result   1. Irregularity of the left SI joint with erosive change and fracture at both   the ileal and sacral aspects. This may reflect septic arthritis with   pathologic fractures at those sites. 2. Moderate body wall edema. CT CERVICAL SPINE WO CONTRAST   Final Result   No acute abnormality of the cervical spine. .      Multilevel degenerative disc disease and spurring         CT HEAD WO CONTRAST   Final Result   No acute intracranial abnormality.          MRI BRAIN W WO CONTRAST    (Results Pending)           Assessment/Plan:    Active Hospital Problems    Diagnosis     Tobacco abuse counseling [Z71.6]      Priority: Medium    Polysubstance abuse ([address] [F19.10]      Priority: Medium    Acute cystitis without hematuria [N30.00]      Priority: Medium    Cellulitis of buttock [A30.587]      Priority: Medium    Sacral fracture, closed ([address] [S32.10XA]      Priority: Medium    Hypoglycemia [E16.2]      Priority: Medium    Severe sepsis ([address] [A41.9, R65.20]      Priority: Medium    Unwitnessed fall [R29.6]      Priority: Medium    Pulmonary emphysema ([facility] [address] [J43.9]      Priority: Medium    Mild bibasilar atelectasis [J98.11]      Priority: Medium    Amphetamine abuse ([facility] [address] [F15.10]      Priority: Medium    Cocaine abuse ([facility] [address] [F14.10]      Priority: Medium    Fentanyl use disorder, mild, abuse ([facility] [address] [F11.10]      Priority: Medium    Screening for HIV (human immunodeficiency virus) [Z11.4]      Priority: Medium    Chronic hepatitis C without hepatic coma ([facility] [address] [B18.2]      Priority: Medium    Elevated C-reactive protein (CRP) [R79.82]      Priority: Medium    Elevated sed rate [R70.0]      Priority: Medium    Septic arthritis of sacroiliac joint ([facility] [address] [M46.58]      Priority: Medium    Fall [W19. XXXA]      Priority: Medium    Bandemia [D72.825]      Priority: Medium    Acute metabolic encephalopathy [O09.75]      Priority: Medium    Acute encephalopathy [G93.40]      Priority: Medium     NS consult for epidural phlegmon/abscess  If no NS intervention, need IR to aspirate sacral area or bone  Echo does not support endocarditis  Check MRI Brain with contrast given vision changes  Elevated ESR and CRP  COWS protocol  DO NOT CALL FOR NARCOTICS   PT/OT eval recommend home PT/OT  Replete K  Counseled on drug cessation  Patient willing to go to SNF upon DC with PICC    DVT Prophylaxis: Lovenox  Diet: ADULT DIET; Regular; 3 carb choices (45 gm/meal); Low Fat/Low Chol/High Fiber/2 gm Na  Code Status: Full Code  PT/OT Eval Status: Following    Dispo - SNF    Discussed with patient, Zackary Beckman (NS NP), Dr Rina Chavez (ID), nursing and CM. Will see what NS thinks. Will get MRI Brain.     Karsten Palomino MD

## 2022-09-16 NOTE — CONSULTS
Pt with Hep C, h/o cocaine abuse, fentanyl abuse, amphetamine abuse was brought to ER for AMS. Admitted as inpatient for acute metabolic encephalopathy, L sacroiliitis, sacral and ileal fractures and leukocytosis. Followed by ID and Ortho. For MRI Pelvis and L spine. Started on IV Abx. Dr. Reeves Meckel and I personally reviewed the patient's pelvic MRI which does show left SI joint septic arthritis/ osteomyelitis with adjacent  myositis. No significant findings in the sacral canal nor is any surgical intervention indicated. Treatment consists of IV antibiotics at the direction of ID. Agree with or through recommendations that in the future she may need SI joint fusion after she has fully recovered from the infection. Will sign off.

## 2022-09-17 ENCOUNTER — APPOINTMENT (OUTPATIENT)
Dept: MRI IMAGING | Age: 47
DRG: 720 | End: 2022-09-17
Payer: MEDICARE

## 2022-09-17 LAB
A/G RATIO: 0.7 (ref 1.1–2.2)
ALBUMIN SERPL-MCNC: 2.9 G/DL (ref 3.4–5)
ALP BLD-CCNC: 88 U/L (ref 40–129)
ALT SERPL-CCNC: 33 U/L (ref 10–40)
ANION GAP SERPL CALCULATED.3IONS-SCNC: 10 MMOL/L (ref 3–16)
AST SERPL-CCNC: 59 U/L (ref 15–37)
BASOPHILS ABSOLUTE: 0 K/UL (ref 0–0.2)
BASOPHILS RELATIVE PERCENT: 0.3 %
BILIRUB SERPL-MCNC: 0.3 MG/DL (ref 0–1)
BUN BLDV-MCNC: 12 MG/DL (ref 7–20)
CALCIUM SERPL-MCNC: 9.2 MG/DL (ref 8.3–10.6)
CHLORIDE BLD-SCNC: 101 MMOL/L (ref 99–110)
CO2: 24 MMOL/L (ref 21–32)
CREAT SERPL-MCNC: 0.6 MG/DL (ref 0.6–1.1)
EOSINOPHILS ABSOLUTE: 0.2 K/UL (ref 0–0.6)
EOSINOPHILS RELATIVE PERCENT: 2.9 %
GFR AFRICAN AMERICAN: >60
GFR NON-AFRICAN AMERICAN: >60
GLUCOSE BLD-MCNC: 95 MG/DL (ref 70–99)
HCT VFR BLD CALC: 35.8 % (ref 36–48)
HEMOGLOBIN: 11.7 G/DL (ref 12–16)
LYMPHOCYTES ABSOLUTE: 2 K/UL (ref 1–5.1)
LYMPHOCYTES RELATIVE PERCENT: 32.9 %
MCH RBC QN AUTO: 28.4 PG (ref 26–34)
MCHC RBC AUTO-ENTMCNC: 32.7 G/DL (ref 31–36)
MCV RBC AUTO: 86.9 FL (ref 80–100)
MONOCYTES ABSOLUTE: 0.5 K/UL (ref 0–1.3)
MONOCYTES RELATIVE PERCENT: 8.1 %
NEUTROPHILS ABSOLUTE: 3.3 K/UL (ref 1.7–7.7)
NEUTROPHILS RELATIVE PERCENT: 55.8 %
PDW BLD-RTO: 16.7 % (ref 12.4–15.4)
PLATELET # BLD: 500 K/UL (ref 135–450)
PMV BLD AUTO: 6.9 FL (ref 5–10.5)
POTASSIUM REFLEX MAGNESIUM: 4.1 MMOL/L (ref 3.5–5.1)
RBC # BLD: 4.12 M/UL (ref 4–5.2)
SODIUM BLD-SCNC: 135 MMOL/L (ref 136–145)
TOTAL PROTEIN: 7.3 G/DL (ref 6.4–8.2)
WBC # BLD: 6 K/UL (ref 4–11)

## 2022-09-17 PROCEDURE — 70551 MRI BRAIN STEM W/O DYE: CPT

## 2022-09-17 PROCEDURE — 85025 COMPLETE CBC W/AUTO DIFF WBC: CPT

## 2022-09-17 PROCEDURE — 2580000003 HC RX 258: Performed by: INTERNAL MEDICINE

## 2022-09-17 PROCEDURE — C1751 CATH, INF, PER/CENT/MIDLINE: HCPCS

## 2022-09-17 PROCEDURE — 80053 COMPREHEN METABOLIC PANEL: CPT

## 2022-09-17 PROCEDURE — 6370000000 HC RX 637 (ALT 250 FOR IP): Performed by: INTERNAL MEDICINE

## 2022-09-17 PROCEDURE — 6360000002 HC RX W HCPCS: Performed by: INTERNAL MEDICINE

## 2022-09-17 PROCEDURE — 2060000000 HC ICU INTERMEDIATE R&B

## 2022-09-17 PROCEDURE — 36415 COLL VENOUS BLD VENIPUNCTURE: CPT

## 2022-09-17 PROCEDURE — 36569 INSJ PICC 5 YR+ W/O IMAGING: CPT

## 2022-09-17 PROCEDURE — 02HV33Z INSERTION OF INFUSION DEVICE INTO SUPERIOR VENA CAVA, PERCUTANEOUS APPROACH: ICD-10-PCS | Performed by: INTERNAL MEDICINE

## 2022-09-17 PROCEDURE — 36573 INSJ PICC RS&I 5 YR+: CPT

## 2022-09-17 RX ORDER — SODIUM CHLORIDE 0.9 % (FLUSH) 0.9 %
5-40 SYRINGE (ML) INJECTION EVERY 12 HOURS SCHEDULED
Status: DISCONTINUED | OUTPATIENT
Start: 2022-09-17 | End: 2022-09-21 | Stop reason: HOSPADM

## 2022-09-17 RX ORDER — SODIUM CHLORIDE 9 MG/ML
25 INJECTION, SOLUTION INTRAVENOUS PRN
Status: DISCONTINUED | OUTPATIENT
Start: 2022-09-17 | End: 2022-09-21 | Stop reason: HOSPADM

## 2022-09-17 RX ORDER — BUPRENORPHINE AND NALOXONE 8; 2 MG/1; MG/1
1 FILM, SOLUBLE BUCCAL; SUBLINGUAL DAILY
Status: DISCONTINUED | OUTPATIENT
Start: 2022-09-17 | End: 2022-09-17

## 2022-09-17 RX ORDER — BUPRENORPHINE HYDROCHLORIDE AND NALOXONE HYDROCHLORIDE DIHYDRATE 2; .5 MG/1; MG/1
2 TABLET SUBLINGUAL PRN
Status: DISCONTINUED | OUTPATIENT
Start: 2022-09-17 | End: 2022-09-17

## 2022-09-17 RX ORDER — SODIUM CHLORIDE 0.9 % (FLUSH) 0.9 %
5-40 SYRINGE (ML) INJECTION PRN
Status: DISCONTINUED | OUTPATIENT
Start: 2022-09-17 | End: 2022-09-21 | Stop reason: HOSPADM

## 2022-09-17 RX ORDER — BUPRENORPHINE AND NALOXONE 2; .5 MG/1; MG/1
1 FILM, SOLUBLE BUCCAL; SUBLINGUAL DAILY
Status: DISCONTINUED | OUTPATIENT
Start: 2022-09-17 | End: 2022-09-17

## 2022-09-17 RX ORDER — LIDOCAINE HYDROCHLORIDE 10 MG/ML
5 INJECTION, SOLUTION EPIDURAL; INFILTRATION; INTRACAUDAL; PERINEURAL ONCE
Status: DISCONTINUED | OUTPATIENT
Start: 2022-09-17 | End: 2022-09-21 | Stop reason: HOSPADM

## 2022-09-17 RX ORDER — BUPRENORPHINE AND NALOXONE 2; .5 MG/1; MG/1
2 FILM, SOLUBLE BUCCAL; SUBLINGUAL PRN
Status: DISCONTINUED | OUTPATIENT
Start: 2022-09-17 | End: 2022-09-18

## 2022-09-17 RX ADMIN — BUPRENORPHINE AND NALOXONE 2 FILM: 2; .5 FILM BUCCAL; SUBLINGUAL at 13:59

## 2022-09-17 RX ADMIN — HYDROXYZINE PAMOATE 50 MG: 25 CAPSULE ORAL at 20:49

## 2022-09-17 RX ADMIN — HYDROXYZINE PAMOATE 50 MG: 25 CAPSULE ORAL at 06:53

## 2022-09-17 RX ADMIN — BUPRENORPHINE AND NALOXONE 2 FILM: 2; .5 FILM BUCCAL; SUBLINGUAL at 20:35

## 2022-09-17 RX ADMIN — Medication 1 CAPSULE: at 08:55

## 2022-09-17 RX ADMIN — GABAPENTIN 300 MG: 300 CAPSULE ORAL at 16:42

## 2022-09-17 RX ADMIN — Medication 1 CAPSULE: at 16:40

## 2022-09-17 RX ADMIN — SODIUM CHLORIDE 25 ML: 9 INJECTION, SOLUTION INTRAVENOUS at 03:42

## 2022-09-17 RX ADMIN — Medication 10 ML: at 20:57

## 2022-09-17 RX ADMIN — CIPROFLOXACIN 500 MG: 500 TABLET, FILM COATED ORAL at 08:55

## 2022-09-17 RX ADMIN — Medication 10 ML: at 20:56

## 2022-09-17 RX ADMIN — TRAMADOL HYDROCHLORIDE 50 MG: 50 TABLET ORAL at 08:54

## 2022-09-17 RX ADMIN — TRAMADOL HYDROCHLORIDE 50 MG: 50 TABLET ORAL at 04:25

## 2022-09-17 RX ADMIN — SODIUM CHLORIDE 25 ML: 9 INJECTION, SOLUTION INTRAVENOUS at 15:34

## 2022-09-17 RX ADMIN — CIPROFLOXACIN 500 MG: 500 TABLET, FILM COATED ORAL at 20:36

## 2022-09-17 RX ADMIN — LAMOTRIGINE 100 MG: 100 TABLET ORAL at 08:55

## 2022-09-17 RX ADMIN — VANCOMYCIN HYDROCHLORIDE 1000 MG: 1 INJECTION, POWDER, LYOPHILIZED, FOR SOLUTION INTRAVENOUS at 03:46

## 2022-09-17 RX ADMIN — VANCOMYCIN HYDROCHLORIDE 1000 MG: 1 INJECTION, POWDER, LYOPHILIZED, FOR SOLUTION INTRAVENOUS at 15:35

## 2022-09-17 ASSESSMENT — PAIN SCALES - GENERAL
PAINLEVEL_OUTOF10: 8
PAINLEVEL_OUTOF10: 4

## 2022-09-17 NOTE — PROGRESS NOTES
PICC PLACEMENT:    Upon arrival to place PICC line assessed chart for issues related to picc placement, check for consent, and did time out with ARIAN Taylor. Pt. Tolerated PICC placement well, no difficulty accessing basilic vein and 3CG technology used to verify PICC tip placement. Positive P wave with no negative deflection. Printed wave form and placed In chart.  Reported off to ARIAN Taylor ok to use line

## 2022-09-17 NOTE — PROGRESS NOTES
Assessment complete. Vitals:    09/16/22 1900   BP: 121/75   Pulse: 78   Resp: 16   Temp: 98.2 °F (36.8 °C)   SpO2: 99%   No SOB or any distress noted. Cows 6 tramadol given. Pain at 8/10. Pain is generalized and in the left hip. POC discussed and agreed upon. Call light within reach, pt encouraged to call if any needs arise. No further requests at this time. Will continue to monitor.

## 2022-09-17 NOTE — PROGRESS NOTES
Occupational R Paulette Herron 114 in bed on arrival. Pt adamantly refusing any OOB mobility this date, reporting 8/10 L LE pain. Provided various treatment options, such as UE exercises, bed level ADLs, however pt continues to decline therapy services. Will reattempt as pt is available and appropriate for services.      Josep Segal [facility] OTR/L [id number]

## 2022-09-17 NOTE — PROGRESS NOTES
Hospitalist Progress Note      PCP: No primary care provider on file. Date of Admission: 9/12/2022    Chief Complaint:  [facility] Course: 43 yo F with Hep C, h/o cocaine abuse, fentanyl abuse, amphetamine abuse was brought to ER for AMS. Admitted as inpatient for acute metabolic encephalopathy, L sacroiliitis, sacral and ileal fractures and leukocytosis. Followed by ID and Ortho. For MRI Pelvis and L spine. Started on IV Abx. Echo:  Normal left ventricle size, wall thickness, and systolic function with an   estimated ejection fraction of 60%. No regional wall motion abnormalities are seen. Right atrial yuridia terminalis more prominent. No suggestion of vegetation. IR unable to aspirate SI joint as no fluid noted per IR attending. Changed to Doxycycine on 9/15. MRI Pelvis     Acute septic arthritis/osteomyelitis of the left sacroiliac joint, with   adjacent myositis. Pathologic fracture of the left sacral wing. Probable phlegmon extending into the epidural space of the sacral canal.      Neurosurgery consulted for sacral epidural phlegmon/abscess; recommend   \"Treatment consists of IV antibiotics at the direction of ID. Agree with or through recommendations that in the future she may need SI joint fusion after she has fully recovered from the infection. \"    MRI L spine:  No acute abnormality of the L-spine identified. Partially visualized septic arthritis of the left sacroiliac joint. Patient with R eye vision changes. MRI Brain   Minimal hyperintense FLAIR signal within the left parietal lobe   periventricular white matter. This finding likely represents minimal chronic   microvascular disease. However a demyelinating process such as multiple   sclerosis is not excluded. No significant orbital abnormality is identified. MRI Brain with and w/o contrast requested to r/o infection or MS. Subjective:  Patient wants Suboxone.   No CP, SOB, HA or abdominal pain. No fevers. Complaining of back pain      Medications:  Reviewed    Infusion Medications    sodium chloride 25 mL (09/17/22 1534)    sodium chloride 25 mL (09/17/22 6022)     Scheduled Medications    lidocaine 1 % injection  5 mL IntraDERmal Once    sodium chloride flush  5-40 mL IntraVENous 2 times per day    ciprofloxacin  500 mg Oral 2 times per day    lactobacillus  1 capsule Oral BID WC    vancomycin  1,000 mg IntraVENous Q12H    sodium chloride flush  10 mL IntraVENous 2 times per day    lamoTRIgine  100 mg Oral Daily     PRN Meds: sodium chloride flush, sodium chloride, buprenorphine-naloxone, traMADol, methocarbamol, dicyclomine, gabapentin, hydrOXYzine pamoate, promethazine, traZODone, sodium chloride flush, sodium chloride, magnesium sulfate, promethazine **OR** ondansetron, acetaminophen **OR** acetaminophen    No intake or output data in the 24 hours ending 09/17/22 1547      Physical Exam Performed:    /67   Pulse 68   Temp 98.6 °F (37 °C) (Oral)   Resp 17   Ht 5' (1.524 m)   Wt 95 lb 12.8 oz (43.5 kg)   LMP  (LMP Unknown)   SpO2 97%   BMI 18.71 kg/m²     General appearance: No apparent distress, appears stated age and cooperative. HEENT: Pupils equal, round, and reactive to light. Conjunctivae/corneas clear. Neck: Supple, with full range of motion. No jugular venous distention. Trachea midline. Respiratory:  Normal respiratory effort. Clear to auscultation, bilaterally without Rales/Wheezes/Rhonchi. Cardiovascular: Regular rate and rhythm with normal S1/S2 without murmurs, rubs or gallops. Abdomen: Soft, non-tender, non-distended with normal bowel sounds. Musculoskeletal: Tender L sacroiliac area. Skin: Skin color, texture, turgor normal.  No rashes or lesions. Neurologic:  Neurovascularly intact without any focal sensory/motor deficits.  Cranial nerves: II-XII intact, grossly non-focal.  Psychiatric: Alert and oriented, thought content appropriate, normal insight  Capillary Refill: Brisk, 3 seconds, normal   Peripheral Pulses: +2 palpable, equal bilaterally       Labs:   Recent Labs     09/16/22 0439 09/17/22 0420   WBC 5.7 6.0   HGB 10.9* 11.7*   HCT 34.0* 35.8*   * 500*       Recent Labs     09/16/22 0439 09/17/22 0420    135*   K 3.9 4.1    101   CO2 23 24   BUN 8 12   CREATININE 0.6 0.6   CALCIUM 8.9 9.2       Recent Labs     09/16/22 0439 09/17/22 0420   AST 51* 59*   ALT 26 33   BILITOT <0.2 0.3   ALKPHOS 80 88       No results for input(s): INR in the last 72 hours. No results for input(s): Elliot Shorts in the last 72 hours. Urinalysis:      Lab Results   Component Value Date/Time    NITRU Negative 09/12/2022 06:31 PM    WBCUA 29 09/12/2022 06:31 PM    BACTERIA Rare 09/12/2022 06:31 PM    RBCUA 2 09/12/2022 06:31 PM    BLOODU Negative 09/12/2022 06:31 PM    SPECGRAV 1.020 09/12/2022 06:31 PM    GLUCOSEU Negative 09/12/2022 06:31 PM       Radiology:  MRI BRAIN WO CONTRAST   Final Result   Minimal hyperintense FLAIR signal within the left parietal lobe   periventricular white matter. This finding likely represents minimal chronic   microvascular disease. However a demyelinating process such as multiple   sclerosis is not excluded. No significant orbital abnormality is identified. MRI PELVIS W WO CONTRAST   Final Result   Acute septic arthritis/osteomyelitis of the left sacroiliac joint, with   adjacent myositis. Pathologic fracture of the left sacral wing. Probable phlegmon extending into the epidural space of the sacral canal.         MRI LUMBAR SPINE W WO CONTRAST   Final Result   No acute abnormality of the L-spine identified. Partially visualized septic arthritis of the left sacroiliac joint. CT CHEST PULMONARY EMBOLISM W CONTRAST   Final Result   Negative examination for pulmonary embolism. Bibasilar dependent atelectasis. No focal infiltrates or pleural effusions. Emphysema and right apical subpleural bulla or blebs         CT ABDOMEN PELVIS W IV CONTRAST Additional Contrast? None   Final Result   1. Irregularity of the left SI joint with erosive change and fracture at both   the ileal and sacral aspects. This may reflect septic arthritis with   pathologic fractures at those sites. 2. Moderate body wall edema. CT CERVICAL SPINE WO CONTRAST   Final Result   No acute abnormality of the cervical spine. .      Multilevel degenerative disc disease and spurring         CT HEAD WO CONTRAST   Final Result   No acute intracranial abnormality. CT BIOPSY DEEP BONE PERCUTANEOUS    (Results Pending)           Assessment/Plan:    Active Hospital Problems    Diagnosis     Tobacco abuse counseling [Z71.6]      Priority: Medium    Polysubstance abuse ([address] [F19.10]      Priority: Medium    Acute cystitis without hematuria [N30.00]      Priority: Medium    Cellulitis of buttock [O21.081]      Priority: Medium    Sacral fracture, closed ([address] [S32.10XA]      Priority: Medium    Hypoglycemia [E16.2]      Priority: Medium    Severe sepsis ([address] [A41.9, R65.20]      Priority: Medium    Unwitnessed fall [R29.6]      Priority: Medium    Pulmonary emphysema ([address] [J43.9]      Priority: Medium    Mild bibasilar atelectasis [J98.11]      Priority: Medium    Amphetamine abuse ([address] [F15.10]      Priority: Medium    Cocaine abuse ([address] [F14.10]      Priority: Medium    Fentanyl use disorder, mild, abuse ([address] [F11.10]      Priority: Medium    Drug abuse counseling and surveillance of drug abuser [Z71.51]      Priority: Medium    Chronic hepatitis C without hepatic coma (HCC) [B18.2]      Priority: Medium    Elevated C-reactive protein (CRP) [R79.82]      Priority: Medium    Elevated sed rate [R70.0]      Priority: Medium    Septic arthritis of sacroiliac joint ([address] [M46.58]      Priority: Medium    Fall [W19. XXXA]      Priority: Medium    Bandemia [D72.825]      Priority: Medium Acute metabolic encephalopathy [Z36.18]      Priority: Medium    Encephalopathy [G93.40]      Priority: Medium     PICC line for access  CT guided bone biopsy requested (likely 9/19)  Echo does not support endocarditis  Check MRI Brain with contrast given vision changes  Elevated ESR and CRP  COWS protocol  Suboxone via COWS protocol  PT/OT eval recommend home PT/OT  Replete K  Counseled on drug cessation  Patient willing to go to SNF upon DC with PICC    DVT Prophylaxis: Lovenox  Diet: ADULT DIET; Regular; 3 carb choices (45 gm/meal); Low Fat/Low Chol/High Fiber/2 gm Na  Diet NPO Exceptions are: Ice Chips, Sips of Water with Meds  Code Status: Full Code  PT/OT Eval Status: Following    Dispo - SNF    Discussed with patient, Marley Gutierrez (PharmD) and nursing. Need CT guided bone bx and results. Will need SNF placement.     Diana Sosa MD

## 2022-09-18 LAB
A/G RATIO: 0.8 (ref 1.1–2.2)
ALBUMIN SERPL-MCNC: 3.4 G/DL (ref 3.4–5)
ALP BLD-CCNC: 92 U/L (ref 40–129)
ALT SERPL-CCNC: 33 U/L (ref 10–40)
ANION GAP SERPL CALCULATED.3IONS-SCNC: 8 MMOL/L (ref 3–16)
AST SERPL-CCNC: 53 U/L (ref 15–37)
BASOPHILS ABSOLUTE: 0.1 K/UL (ref 0–0.2)
BASOPHILS RELATIVE PERCENT: 1.5 %
BILIRUB SERPL-MCNC: <0.2 MG/DL (ref 0–1)
BILIRUBIN URINE: NEGATIVE
BLOOD, URINE: NEGATIVE
BUN BLDV-MCNC: 11 MG/DL (ref 7–20)
CALCIUM SERPL-MCNC: 9.6 MG/DL (ref 8.3–10.6)
CHLORIDE BLD-SCNC: 100 MMOL/L (ref 99–110)
CLARITY: CLEAR
CO2: 30 MMOL/L (ref 21–32)
COLOR: YELLOW
CREAT SERPL-MCNC: 0.8 MG/DL (ref 0.6–1.1)
EOSINOPHILS ABSOLUTE: 0.2 K/UL (ref 0–0.6)
EOSINOPHILS RELATIVE PERCENT: 3.1 %
GFR AFRICAN AMERICAN: >60
GFR NON-AFRICAN AMERICAN: >60
GLUCOSE BLD-MCNC: 120 MG/DL (ref 70–99)
GLUCOSE URINE: NEGATIVE MG/DL
HCT VFR BLD CALC: 35.4 % (ref 36–48)
HEMOGLOBIN: 11.5 G/DL (ref 12–16)
KETONES, URINE: NEGATIVE MG/DL
LEUKOCYTE ESTERASE, URINE: NEGATIVE
LYMPHOCYTES ABSOLUTE: 2.4 K/UL (ref 1–5.1)
LYMPHOCYTES RELATIVE PERCENT: 34.5 %
MCH RBC QN AUTO: 27.8 PG (ref 26–34)
MCHC RBC AUTO-ENTMCNC: 32.4 G/DL (ref 31–36)
MCV RBC AUTO: 85.7 FL (ref 80–100)
MICROSCOPIC EXAMINATION: NORMAL
MONOCYTES ABSOLUTE: 0.6 K/UL (ref 0–1.3)
MONOCYTES RELATIVE PERCENT: 8.5 %
NEUTROPHILS ABSOLUTE: 3.6 K/UL (ref 1.7–7.7)
NEUTROPHILS RELATIVE PERCENT: 52.4 %
NITRITE, URINE: NEGATIVE
PDW BLD-RTO: 16.9 % (ref 12.4–15.4)
PH UA: 7 (ref 5–8)
PLATELET # BLD: 406 K/UL (ref 135–450)
PMV BLD AUTO: 6.4 FL (ref 5–10.5)
POTASSIUM REFLEX MAGNESIUM: 4.2 MMOL/L (ref 3.5–5.1)
PROTEIN UA: NEGATIVE MG/DL
RBC # BLD: 4.14 M/UL (ref 4–5.2)
SODIUM BLD-SCNC: 138 MMOL/L (ref 136–145)
SPECIFIC GRAVITY UA: <=1.005 (ref 1–1.03)
TOTAL PROTEIN: 7.6 G/DL (ref 6.4–8.2)
URINE REFLEX TO CULTURE: NORMAL
URINE TYPE: NORMAL
UROBILINOGEN, URINE: 0.2 E.U./DL
VANCOMYCIN RANDOM: 19.9 UG/ML
WBC # BLD: 6.8 K/UL (ref 4–11)

## 2022-09-18 PROCEDURE — 2060000000 HC ICU INTERMEDIATE R&B

## 2022-09-18 PROCEDURE — 6370000000 HC RX 637 (ALT 250 FOR IP): Performed by: INTERNAL MEDICINE

## 2022-09-18 PROCEDURE — 85025 COMPLETE CBC W/AUTO DIFF WBC: CPT

## 2022-09-18 PROCEDURE — 2580000003 HC RX 258: Performed by: INTERNAL MEDICINE

## 2022-09-18 PROCEDURE — 81003 URINALYSIS AUTO W/O SCOPE: CPT

## 2022-09-18 PROCEDURE — 80202 ASSAY OF VANCOMYCIN: CPT

## 2022-09-18 PROCEDURE — 6360000002 HC RX W HCPCS: Performed by: INTERNAL MEDICINE

## 2022-09-18 PROCEDURE — 80053 COMPREHEN METABOLIC PANEL: CPT

## 2022-09-18 RX ORDER — BUPRENORPHINE AND NALOXONE 2; .5 MG/1; MG/1
2 FILM, SOLUBLE BUCCAL; SUBLINGUAL 3 TIMES DAILY
Status: DISCONTINUED | OUTPATIENT
Start: 2022-09-18 | End: 2022-09-20

## 2022-09-18 RX ADMIN — LAMOTRIGINE 100 MG: 100 TABLET ORAL at 08:38

## 2022-09-18 RX ADMIN — Medication 10 ML: at 08:40

## 2022-09-18 RX ADMIN — BUPRENORPHINE AND NALOXONE 2 FILM: 2; .5 FILM BUCCAL; SUBLINGUAL at 21:24

## 2022-09-18 RX ADMIN — VANCOMYCIN HYDROCHLORIDE 1000 MG: 1 INJECTION, POWDER, LYOPHILIZED, FOR SOLUTION INTRAVENOUS at 02:13

## 2022-09-18 RX ADMIN — HYDROXYZINE PAMOATE 50 MG: 25 CAPSULE ORAL at 07:05

## 2022-09-18 RX ADMIN — BUPRENORPHINE AND NALOXONE 2 FILM: 2; .5 FILM BUCCAL; SUBLINGUAL at 00:27

## 2022-09-18 RX ADMIN — GABAPENTIN 300 MG: 300 CAPSULE ORAL at 21:25

## 2022-09-18 RX ADMIN — GABAPENTIN 300 MG: 300 CAPSULE ORAL at 00:24

## 2022-09-18 RX ADMIN — VANCOMYCIN HYDROCHLORIDE 1250 MG: 10 INJECTION, POWDER, LYOPHILIZED, FOR SOLUTION INTRAVENOUS at 14:52

## 2022-09-18 RX ADMIN — BUPRENORPHINE AND NALOXONE 2 FILM: 2; .5 FILM BUCCAL; SUBLINGUAL at 14:46

## 2022-09-18 RX ADMIN — CIPROFLOXACIN 500 MG: 500 TABLET, FILM COATED ORAL at 21:25

## 2022-09-18 RX ADMIN — CIPROFLOXACIN 500 MG: 500 TABLET, FILM COATED ORAL at 08:38

## 2022-09-18 RX ADMIN — Medication 10 ML: at 21:26

## 2022-09-18 RX ADMIN — Medication 1 CAPSULE: at 17:06

## 2022-09-18 RX ADMIN — GABAPENTIN 300 MG: 300 CAPSULE ORAL at 08:38

## 2022-09-18 RX ADMIN — BUPRENORPHINE AND NALOXONE 2 FILM: 2; .5 FILM BUCCAL; SUBLINGUAL at 08:38

## 2022-09-18 RX ADMIN — TRAZODONE HYDROCHLORIDE 50 MG: 50 TABLET ORAL at 21:26

## 2022-09-18 RX ADMIN — HYDROXYZINE PAMOATE 50 MG: 25 CAPSULE ORAL at 17:06

## 2022-09-18 RX ADMIN — Medication 1 CAPSULE: at 08:38

## 2022-09-18 RX ADMIN — GABAPENTIN 300 MG: 300 CAPSULE ORAL at 16:27

## 2022-09-18 RX ADMIN — TRAZODONE HYDROCHLORIDE 50 MG: 50 TABLET ORAL at 00:30

## 2022-09-18 RX ADMIN — METHOCARBAMOL TABLETS 750 MG: 500 TABLET, COATED ORAL at 07:05

## 2022-09-18 ASSESSMENT — PAIN SCALES - WONG BAKER
WONGBAKER_NUMERICALRESPONSE: 0

## 2022-09-18 ASSESSMENT — PAIN SCALES - GENERAL
PAINLEVEL_OUTOF10: 8
PAINLEVEL_OUTOF10: 8
PAINLEVEL_OUTOF10: 7
PAINLEVEL_OUTOF10: 4

## 2022-09-18 NOTE — PLAN OF CARE
Problem: Pain  Goal: Verbalizes/displays adequate comfort level or baseline comfort level  Outcome: Not Progressing  Flowsheets  Taken 9/18/2022 0326  Verbalizes/displays adequate comfort level or baseline comfort level:   Assess pain using appropriate pain scale   Administer analgesics based on type and severity of pain and evaluate response   Implement non-pharmacological measures as appropriate and evaluate response  Taken 9/18/2022 0030  Verbalizes/displays adequate comfort level or baseline comfort level:   Encourage patient to monitor pain and request assistance   Assess pain using appropriate pain scale   Administer analgesics based on type and severity of pain and evaluate response     Problem: Discharge Planning  Goal: Discharge to home or other facility with appropriate resources  Outcome: Progressing  Flowsheets (Taken 9/18/2022 0326)  Discharge to home or other facility with appropriate resources:   Identify barriers to discharge with patient and caregiver   Arrange for needed discharge resources and transportation as appropriate   Identify discharge learning needs (meds, wound care, etc)  Note: Pt should be going to SNF. Problem: Skin/Tissue Integrity  Goal: Absence of new skin breakdown  Description: 1. Monitor for areas of redness and/or skin breakdown  2. Assess vascular access sites hourly  3. Every 4-6 hours minimum:  Change oxygen saturation probe site  4. Every 4-6 hours:  If on nasal continuous positive airway pressure, respiratory therapy assess nares and determine need for appliance change or resting period. Outcome: Progressing  Note: Pt able to ambulate and turn herself I the bed. Pt also gets up to the chair. Problem: Safety - Adult  Goal: Free from fall injury  Outcome: Progressing  Flowsheets (Taken 9/16/2022 0052)  Free From Fall Injury: Instruct family/caregiver on patient safety  Note: Pt more steady on her feet. Pt is now getting up to the bathroom independently.  Camera remains in the room for safety. Problem: ABCDS Injury Assessment  Goal: Absence of physical injury  Outcome: Progressing  Flowsheets (Taken 9/16/2022 0052)  Absence of Physical Injury: Implement safety measures based on patient assessment     Problem: Neurosensory - Adult  Goal: Achieves stable or improved neurological status  Outcome: Progressing    Problem: Genitourinary - Adult  Goal: Absence of urinary retention  Outcome: Progressing  Absence of urinary retention: Assess patients ability to void and empty bladder  Note: Pt up to the bathroom numerous times t/o shift . Calls out to inform staff that she is getting out of bed. Problem: Anxiety  Goal: Will report anxiety at manageable levels  Description: INTERVENTIONS:  1. Administer medication as ordered  2. Teach and rehearse alternative coping skills  3. Provide emotional support with 1:1 interaction with staff  Outcome: Progressing    Problem: Coping  Goal: Pt/Family able to verbalize concerns and demonstrate effective coping strategies  Description: INTERVENTIONS:  1. Assist patient/family to identify coping skills, available support systems and cultural and spiritual values  2. Provide emotional support, including active listening and acknowledgement of concerns of patient and caregivers  3. Reduce environmental stimuli, as able  4. Instruct patient/family in relaxation techniques, as appropriate  5.  Assess for spiritual pain/suffering and initiate Spiritual Care, Psychosocial Clinical Specialist consults as needed  Outcome: Progressing  Patient/family able to verbalize anxieties, fears, and concerns, and demonstrate effective coping: Provide emotional support, including active listening and acknowledgement of concerns of patient and caregivers   Will report anxiety at manageable levels:   Administer medication as ordered   Provide emotional support with 1:1 interaction with staff   uriah: Skin/Tissue Integrity - Adult  Goal: Skin integrity remains intact  Outcome: Progressing     Problem: Musculoskeletal - Adult  Goal: Return mobility to safest level of function  Outcome: Progressing  Return Mobility to Safest Level of Function: Assess patient stability and activity tolerance for standing, transferring and ambulating with or without assistive devices  Note: Ambulation has improved.       Problem: Pain  Goal: Verbalizes/displays adequate comfort level or baseline comfort level  Outcome: Not Progressing  Flowsheets  Taken 9/18/2022 0326  Verbalizes/displays adequate comfort level or baseline comfort level:   Assess pain using appropriate pain scale   Administer analgesics based on type and severity of pain and evaluate response   Implement non-pharmacological measures as appropriate and evaluate response  Taken 9/18/2022 0030  Verbalizes/displays adequate comfort level or baseline comfort level:   Encourage patient to monitor pain and request assistance   Assess pain using appropriate pain scale   Administer analgesics based on type and severity of pain and evaluate response

## 2022-09-18 NOTE — PROGRESS NOTES
Clinical Pharmacy Note: Pharmacy to Dose Vancomycin    Vancomycin Day: 7  Current Dose: 1000 mg q12  Dosing Method: Bayesian Modeling    Random: 19.9    Recent Labs     09/17/22  0420 09/18/22  1100   BUN 12 11       Recent Labs     09/17/22  0420 09/18/22  1100   CREATININE 0.6 0.8       Recent Labs     09/17/22  0420 09/18/22  1100   WBC 6.0 6.8       No intake or output data in the 24 hours ending 09/18/22 1228      Ht Readings from Last 1 Encounters:   09/13/22 5' (1.524 m)        Wt Readings from Last 1 Encounters:   09/18/22 103 lb 4.8 oz (46.9 kg)         Body mass index is 20.17 kg/m². Estimated Creatinine Clearance: 64 mL/min (based on SCr of 0.8 mg/dL). Assessment/Plan:  Vancomycin level is Supratherapeutic. Decrease vancomycin regimen to 1250 mg every 24 hours. Bayesian Modeling predicts an AUC of 519 mg/L*hr and trough of 14.2 mg/L. A vancomycin random level has been ordered on 9/20 at 0600 for follow-up. Changes in regimen will be determined based on culture results, renal function, and clinical response. Pharmacy will continue to monitor and adjust regimen as necessary.     Thank you for the consult,    May Morales, PharmD, [facility]

## 2022-09-18 NOTE — PROGRESS NOTES
Hospitalist Progress Note      PCP: No primary care provider on file. Date of Admission: 9/12/2022    Chief Complaint:  [facility] Course: 41 yo F with Hep C, h/o cocaine abuse, fentanyl abuse, amphetamine abuse was brought to ER for AMS. Admitted as inpatient for acute metabolic encephalopathy, L sacroiliitis, sacral and ileal fractures and leukocytosis. Followed by ID and Ortho. For MRI Pelvis and L spine. Started on IV Abx. Echo:  Normal left ventricle size, wall thickness, and systolic function with an   estimated ejection fraction of 60%. No regional wall motion abnormalities are seen. Right atrial yuridia terminalis more prominent. No suggestion of vegetation. IR unable to aspirate SI joint as no fluid noted per IR attending. Changed to Doxycycine on 9/15. MRI Pelvis     Acute septic arthritis/osteomyelitis of the left sacroiliac joint, with   adjacent myositis. Pathologic fracture of the left sacral wing. Probable phlegmon extending into the epidural space of the sacral canal.      Neurosurgery consulted for sacral epidural phlegmon/abscess; recommend   \"Treatment consists of IV antibiotics at the direction of ID. Agree with or through recommendations that in the future she may need SI joint fusion after she has fully recovered from the infection. \"    MRI L spine:  No acute abnormality of the L-spine identified. Partially visualized septic arthritis of the left sacroiliac joint. Patient with R eye vision changes. MRI Brain   Minimal hyperintense FLAIR signal within the left parietal lobe   periventricular white matter. This finding likely represents minimal chronic   microvascular disease. However a demyelinating process such as multiple   sclerosis is not excluded. No significant orbital abnormality is identified. MRI Brain with and w/o contrast requested to r/o infection or MS. PICC placed on 9/17/22.     On Vanco IV and PO Cipro.    Subjective:  Patient wants more Suboxone. No CP, SOB, HA or abdominal pain. No fevers. Complaining of back pain      Medications:  Reviewed    Infusion Medications    sodium chloride 100 mL/hr at 09/18/22 0212    sodium chloride 25 mL (09/17/22 0342)     Scheduled Medications    buprenorphine-naloxone  2 Film SubLINGual TID    lidocaine 1 % injection  5 mL IntraDERmal Once    sodium chloride flush  5-40 mL IntraVENous 2 times per day    ciprofloxacin  500 mg Oral 2 times per day    lactobacillus  1 capsule Oral BID WC    vancomycin  1,000 mg IntraVENous Q12H    sodium chloride flush  10 mL IntraVENous 2 times per day    lamoTRIgine  100 mg Oral Daily     PRN Meds: sodium chloride flush, sodium chloride, methocarbamol, dicyclomine, gabapentin, hydrOXYzine pamoate, promethazine, traZODone, sodium chloride flush, sodium chloride, magnesium sulfate, promethazine **OR** ondansetron, acetaminophen **OR** acetaminophen    No intake or output data in the 24 hours ending 09/18/22 1140      Physical Exam Performed:    /64   Pulse 93   Temp 98.3 °F (36.8 °C) (Oral)   Resp 16   Ht 5' (1.524 m)   Wt 103 lb 4.8 oz (46.9 kg)   LMP  (LMP Unknown)   SpO2 96%   BMI 20.17 kg/m²     General appearance: No apparent distress, appears stated age and cooperative. HEENT: Pupils equal, round, and reactive to light. Conjunctivae/corneas clear. Neck: Supple, with full range of motion. No jugular venous distention. Trachea midline. Respiratory:  Normal respiratory effort. Clear to auscultation, bilaterally without Rales/Wheezes/Rhonchi. Cardiovascular: Regular rate and rhythm with normal S1/S2 without murmurs, rubs or gallops. Abdomen: Soft, non-tender, non-distended with normal bowel sounds. Musculoskeletal: Tender L sacroiliac area. Skin: Skin color, texture, turgor normal.  No rashes or lesions. Neurologic:  Neurovascularly intact without any focal sensory/motor deficits.  Cranial nerves: II-XII intact, grossly non-focal.  Psychiatric: Alert and oriented, thought content appropriate, normal insight  Capillary Refill: Brisk, 3 seconds, normal   Peripheral Pulses: +2 palpable, equal bilaterally       Labs:   Recent Labs     09/16/22 0439 09/17/22 0420 09/18/22  1100   WBC 5.7 6.0 6.8   HGB 10.9* 11.7* 11.5*   HCT 34.0* 35.8* 35.4*   * 500* 406       Recent Labs     09/16/22 0439 09/17/22 0420    135*   K 3.9 4.1    101   CO2 23 24   BUN 8 12   CREATININE 0.6 0.6   CALCIUM 8.9 9.2       Recent Labs     09/16/22 0439 09/17/22 0420   AST 51* 59*   ALT 26 33   BILITOT <0.2 0.3   ALKPHOS 80 88       No results for input(s): INR in the last 72 hours. No results for input(s): Cali Shackle in the last 72 hours. Urinalysis:      Lab Results   Component Value Date/Time    NITRU Negative 09/12/2022 06:31 PM    WBCUA 29 09/12/2022 06:31 PM    BACTERIA Rare 09/12/2022 06:31 PM    RBCUA 2 09/12/2022 06:31 PM    BLOODU Negative 09/12/2022 06:31 PM    SPECGRAV 1.020 09/12/2022 06:31 PM    GLUCOSEU Negative 09/12/2022 06:31 PM       Radiology:  MRI BRAIN WO CONTRAST   Final Result   Minimal hyperintense FLAIR signal within the left parietal lobe   periventricular white matter. This finding likely represents minimal chronic   microvascular disease. However a demyelinating process such as multiple   sclerosis is not excluded. No significant orbital abnormality is identified. MRI PELVIS W WO CONTRAST   Final Result   Acute septic arthritis/osteomyelitis of the left sacroiliac joint, with   adjacent myositis. Pathologic fracture of the left sacral wing. Probable phlegmon extending into the epidural space of the sacral canal.         MRI LUMBAR SPINE W WO CONTRAST   Final Result   No acute abnormality of the L-spine identified. Partially visualized septic arthritis of the left sacroiliac joint.          CT CHEST PULMONARY EMBOLISM W CONTRAST   Final Result   Negative examination for pulmonary embolism. Bibasilar dependent atelectasis. No focal infiltrates or pleural effusions. Emphysema and right apical subpleural bulla or blebs         CT ABDOMEN PELVIS W IV CONTRAST Additional Contrast? None   Final Result   1. Irregularity of the left SI joint with erosive change and fracture at both   the ileal and sacral aspects. This may reflect septic arthritis with   pathologic fractures at those sites. 2. Moderate body wall edema. CT CERVICAL SPINE WO CONTRAST   Final Result   No acute abnormality of the cervical spine. .      Multilevel degenerative disc disease and spurring         CT HEAD WO CONTRAST   Final Result   No acute intracranial abnormality.          CT BIOPSY DEEP BONE PERCUTANEOUS    (Results Pending)   MRI BRAIN W WO CONTRAST    (Results Pending)           Assessment/Plan:    Active Hospital Problems    Diagnosis     Tobacco abuse counseling [Z71.6]      Priority: Medium    Polysubstance abuse ([address] [F19.10]      Priority: Medium    Acute cystitis without hematuria [N30.00]      Priority: Medium    Cellulitis of buttock [H70.422]      Priority: Medium    Sacral fracture, closed ([address] [S32.10XA]      Priority: Medium    Hypoglycemia [E16.2]      Priority: Medium    Severe sepsis ([address] [A41.9, R65.20]      Priority: Medium    Unwitnessed fall [R29.6]      Priority: Medium    Pulmonary emphysema ([address] [J43.9]      Priority: Medium    Mild bibasilar atelectasis [J98.11]      Priority: Medium    Amphetamine abuse ([address] [F15.10]      Priority: Medium    Cocaine abuse ([address] [F14.10]      Priority: Medium    Fentanyl use disorder, mild, abuse ([address] [F11.10]      Priority: Medium    Drug abuse counseling and surveillance of drug abuser [Z71.51]      Priority: Medium    Chronic hepatitis C without hepatic coma (HCC) [B18.2]      Priority: Medium    Elevated C-reactive protein (CRP) [R79.82]      Priority: Medium    Elevated sed rate [R70.0]      Priority: Medium    Septic arthritis of sacroiliac joint ([facility] [M46.58]      Priority: Medium    Fall [W19. XXXA]      Priority: Medium    Bandemia [D72.825]      Priority: Medium    Acute metabolic encephalopathy [V95.77]      Priority: Medium    Encephalopathy [G93.40]      Priority: Medium     Increase Suboxone to q8h scheduled, not COWS  CT guided bone biopsy tomorrow  Echo does not support endocarditis  Await MRI Brain with contrast given vision changes  On Vanco IV and PO Cipro  PICC placed 9/17/22  Stop Tramadol  PT/OT eval recommend home PT/OT  Repleted K  Counseled on drug cessation  Patient willing to go to SNF upon DC with PICC  Will give Vivitrol shot before SNF    DVT Prophylaxis: Lovenox  Diet: ADULT DIET; Regular  Diet NPO Exceptions are: Sips of Water with Meds  Code Status: Full Code  PT/OT Eval Status: Following    Dispo - SNF    Discussed with patient, Leonel Franco (PharmD) and nursing. Need MRI Brain with contrast and CT guided bone bx results. Needs SNF placement.     Angie Dasilva MD

## 2022-09-18 NOTE — PROGRESS NOTES
Assessment complete. VSS no SOB or any distress noted. Suboxone given for COWS of 12. Pt tearful and angry at this time. POC discussed along with pain management and withdrawal regimen. Call light within reach, pt encouraged to call if any needs arise. No further requests at this time. Will continue to monitor.

## 2022-09-19 ENCOUNTER — APPOINTMENT (OUTPATIENT)
Dept: MRI IMAGING | Age: 47
DRG: 720 | End: 2022-09-19
Payer: MEDICARE

## 2022-09-19 ENCOUNTER — APPOINTMENT (OUTPATIENT)
Dept: CT IMAGING | Age: 47
DRG: 720 | End: 2022-09-19
Payer: MEDICARE

## 2022-09-19 LAB
A/G RATIO: 0.7 (ref 1.1–2.2)
ALBUMIN SERPL-MCNC: 3.3 G/DL (ref 3.4–5)
ALP BLD-CCNC: 92 U/L (ref 40–129)
ALT SERPL-CCNC: 34 U/L (ref 10–40)
ANION GAP SERPL CALCULATED.3IONS-SCNC: 10 MMOL/L (ref 3–16)
AST SERPL-CCNC: 59 U/L (ref 15–37)
BASOPHILS ABSOLUTE: 0.1 K/UL (ref 0–0.2)
BASOPHILS RELATIVE PERCENT: 1.1 %
BILIRUB SERPL-MCNC: <0.2 MG/DL (ref 0–1)
BUN BLDV-MCNC: 14 MG/DL (ref 7–20)
CALCIUM SERPL-MCNC: 9.5 MG/DL (ref 8.3–10.6)
CHLORIDE BLD-SCNC: 102 MMOL/L (ref 99–110)
CO2: 28 MMOL/L (ref 21–32)
CREAT SERPL-MCNC: 0.7 MG/DL (ref 0.6–1.1)
EOSINOPHILS ABSOLUTE: 0.2 K/UL (ref 0–0.6)
EOSINOPHILS RELATIVE PERCENT: 3.3 %
GFR AFRICAN AMERICAN: >60
GFR NON-AFRICAN AMERICAN: >60
GLUCOSE BLD-MCNC: 105 MG/DL (ref 70–99)
HCT VFR BLD CALC: 33.9 % (ref 36–48)
HEMOGLOBIN: 11 G/DL (ref 12–16)
LYMPHOCYTES ABSOLUTE: 2.5 K/UL (ref 1–5.1)
LYMPHOCYTES RELATIVE PERCENT: 37.4 %
MCH RBC QN AUTO: 28.3 PG (ref 26–34)
MCHC RBC AUTO-ENTMCNC: 32.5 G/DL (ref 31–36)
MCV RBC AUTO: 86.8 FL (ref 80–100)
MONOCYTES ABSOLUTE: 0.6 K/UL (ref 0–1.3)
MONOCYTES RELATIVE PERCENT: 9.4 %
NEUTROPHILS ABSOLUTE: 3.2 K/UL (ref 1.7–7.7)
NEUTROPHILS RELATIVE PERCENT: 48.8 %
PDW BLD-RTO: 17.2 % (ref 12.4–15.4)
PLATELET # BLD: 376 K/UL (ref 135–450)
PMV BLD AUTO: 6.6 FL (ref 5–10.5)
POTASSIUM REFLEX MAGNESIUM: 4.2 MMOL/L (ref 3.5–5.1)
RBC # BLD: 3.91 M/UL (ref 4–5.2)
SODIUM BLD-SCNC: 140 MMOL/L (ref 136–145)
TOTAL PROTEIN: 7.8 G/DL (ref 6.4–8.2)
WBC # BLD: 6.6 K/UL (ref 4–11)

## 2022-09-19 PROCEDURE — 88311 DECALCIFY TISSUE: CPT

## 2022-09-19 PROCEDURE — A9577 INJ MULTIHANCE: HCPCS | Performed by: INTERNAL MEDICINE

## 2022-09-19 PROCEDURE — 6360000002 HC RX W HCPCS: Performed by: INTERNAL MEDICINE

## 2022-09-19 PROCEDURE — 07DR3ZX EXTRACTION OF ILIAC BONE MARROW, PERCUTANEOUS APPROACH, DIAGNOSTIC: ICD-10-PCS | Performed by: RADIOLOGY

## 2022-09-19 PROCEDURE — 6370000000 HC RX 637 (ALT 250 FOR IP): Performed by: INTERNAL MEDICINE

## 2022-09-19 PROCEDURE — 6360000002 HC RX W HCPCS: Performed by: RADIOLOGY

## 2022-09-19 PROCEDURE — 2580000003 HC RX 258: Performed by: INTERNAL MEDICINE

## 2022-09-19 PROCEDURE — 80053 COMPREHEN METABOLIC PANEL: CPT

## 2022-09-19 PROCEDURE — 6360000004 HC RX CONTRAST MEDICATION: Performed by: INTERNAL MEDICINE

## 2022-09-19 PROCEDURE — 88341 IMHCHEM/IMCYTCHM EA ADD ANTB: CPT

## 2022-09-19 PROCEDURE — 70552 MRI BRAIN STEM W/DYE: CPT

## 2022-09-19 PROCEDURE — 99233 SBSQ HOSP IP/OBS HIGH 50: CPT | Performed by: INTERNAL MEDICINE

## 2022-09-19 PROCEDURE — 82365 CALCULUS SPECTROSCOPY: CPT

## 2022-09-19 PROCEDURE — 97530 THERAPEUTIC ACTIVITIES: CPT

## 2022-09-19 PROCEDURE — 88307 TISSUE EXAM BY PATHOLOGIST: CPT

## 2022-09-19 PROCEDURE — 2060000000 HC ICU INTERMEDIATE R&B

## 2022-09-19 PROCEDURE — 2709999900 CT BIOPSY DEEP BONE PERCUTANEOUS

## 2022-09-19 PROCEDURE — 85025 COMPLETE CBC W/AUTO DIFF WBC: CPT

## 2022-09-19 PROCEDURE — 88342 IMHCHEM/IMCYTCHM 1ST ANTB: CPT

## 2022-09-19 PROCEDURE — 97116 GAIT TRAINING THERAPY: CPT

## 2022-09-19 PROCEDURE — 77012 CT SCAN FOR NEEDLE BIOPSY: CPT

## 2022-09-19 PROCEDURE — 0QB33ZX EXCISION OF LEFT PELVIC BONE, PERCUTANEOUS APPROACH, DIAGNOSTIC: ICD-10-PCS | Performed by: RADIOLOGY

## 2022-09-19 RX ORDER — DIAZEPAM 5 MG/1
5 TABLET ORAL EVERY 6 HOURS PRN
Status: DISCONTINUED | OUTPATIENT
Start: 2022-09-19 | End: 2022-09-20

## 2022-09-19 RX ORDER — MIDAZOLAM HYDROCHLORIDE 2 MG/2ML
INJECTION, SOLUTION INTRAMUSCULAR; INTRAVENOUS
Status: COMPLETED | OUTPATIENT
Start: 2022-09-19 | End: 2022-09-19

## 2022-09-19 RX ORDER — ACETAMINOPHEN 325 MG/1
650 TABLET ORAL EVERY 6 HOURS
Status: DISCONTINUED | OUTPATIENT
Start: 2022-09-19 | End: 2022-09-21

## 2022-09-19 RX ORDER — SODIUM CHLORIDE 0.9 % (FLUSH) 0.9 %
10 SYRINGE (ML) INJECTION ONCE
Status: COMPLETED | OUTPATIENT
Start: 2022-09-19 | End: 2022-09-19

## 2022-09-19 RX ADMIN — GABAPENTIN 300 MG: 300 CAPSULE ORAL at 21:40

## 2022-09-19 RX ADMIN — LAMOTRIGINE 100 MG: 100 TABLET ORAL at 08:25

## 2022-09-19 RX ADMIN — GADOBENATE DIMEGLUMINE 9 ML: 529 INJECTION, SOLUTION INTRAVENOUS at 19:21

## 2022-09-19 RX ADMIN — Medication 10 ML: at 19:24

## 2022-09-19 RX ADMIN — BUPRENORPHINE AND NALOXONE 2 FILM: 2; .5 FILM BUCCAL; SUBLINGUAL at 20:05

## 2022-09-19 RX ADMIN — Medication 10 ML: at 01:06

## 2022-09-19 RX ADMIN — MIDAZOLAM HYDROCHLORIDE 1 MG: 1 INJECTION, SOLUTION INTRAMUSCULAR; INTRAVENOUS at 10:33

## 2022-09-19 RX ADMIN — DIAZEPAM 5 MG: 5 TABLET ORAL at 20:05

## 2022-09-19 RX ADMIN — Medication 10 ML: at 08:29

## 2022-09-19 RX ADMIN — GABAPENTIN 300 MG: 300 CAPSULE ORAL at 08:24

## 2022-09-19 RX ADMIN — CIPROFLOXACIN 500 MG: 500 TABLET, FILM COATED ORAL at 08:24

## 2022-09-19 RX ADMIN — Medication 1 CAPSULE: at 17:13

## 2022-09-19 RX ADMIN — ACETAMINOPHEN 650 MG: 325 TABLET ORAL at 20:05

## 2022-09-19 RX ADMIN — DIAZEPAM 5 MG: 5 TABLET ORAL at 13:36

## 2022-09-19 RX ADMIN — ACETAMINOPHEN 650 MG: 325 TABLET ORAL at 13:34

## 2022-09-19 RX ADMIN — GABAPENTIN 300 MG: 300 CAPSULE ORAL at 13:40

## 2022-09-19 RX ADMIN — Medication 10 ML: at 20:08

## 2022-09-19 RX ADMIN — VANCOMYCIN HYDROCHLORIDE 1250 MG: 10 INJECTION, POWDER, LYOPHILIZED, FOR SOLUTION INTRAVENOUS at 13:58

## 2022-09-19 RX ADMIN — BUPRENORPHINE AND NALOXONE 2 FILM: 2; .5 FILM BUCCAL; SUBLINGUAL at 08:30

## 2022-09-19 RX ADMIN — DIAZEPAM 5 MG: 5 TABLET ORAL at 08:25

## 2022-09-19 RX ADMIN — CIPROFLOXACIN 500 MG: 500 TABLET, FILM COATED ORAL at 20:05

## 2022-09-19 RX ADMIN — BUPRENORPHINE AND NALOXONE 2 FILM: 2; .5 FILM BUCCAL; SUBLINGUAL at 13:34

## 2022-09-19 RX ADMIN — Medication 1 CAPSULE: at 08:25

## 2022-09-19 RX ADMIN — PROMETHAZINE HYDROCHLORIDE 25 MG: 25 TABLET ORAL at 08:24

## 2022-09-19 RX ADMIN — METHOCARBAMOL TABLETS 750 MG: 500 TABLET, COATED ORAL at 20:04

## 2022-09-19 RX ADMIN — ACETAMINOPHEN 650 MG: 325 TABLET ORAL at 08:25

## 2022-09-19 RX ADMIN — TRAZODONE HYDROCHLORIDE 50 MG: 50 TABLET ORAL at 20:05

## 2022-09-19 RX ADMIN — MIDAZOLAM HYDROCHLORIDE 1 MG: 1 INJECTION, SOLUTION INTRAMUSCULAR; INTRAVENOUS at 10:27

## 2022-09-19 RX ADMIN — MIDAZOLAM HYDROCHLORIDE 1 MG: 1 INJECTION, SOLUTION INTRAMUSCULAR; INTRAVENOUS at 10:28

## 2022-09-19 RX ADMIN — HYDROXYZINE PAMOATE 50 MG: 25 CAPSULE ORAL at 04:07

## 2022-09-19 RX ADMIN — METHOCARBAMOL TABLETS 750 MG: 500 TABLET, COATED ORAL at 04:07

## 2022-09-19 ASSESSMENT — ENCOUNTER SYMPTOMS
COUGH: 0
EYE DISCHARGE: 0
RHINORRHEA: 0
BACK PAIN: 0
WHEEZING: 0
NAUSEA: 0
CONSTIPATION: 0
DIARRHEA: 0
EYE REDNESS: 0
ABDOMINAL PAIN: 0
SINUS PRESSURE: 0
SORE THROAT: 0
SHORTNESS OF BREATH: 0
SINUS PAIN: 0

## 2022-09-19 ASSESSMENT — PAIN SCALES - WONG BAKER
WONGBAKER_NUMERICALRESPONSE: 0

## 2022-09-19 ASSESSMENT — PAIN SCALES - GENERAL
PAINLEVEL_OUTOF10: 7
PAINLEVEL_OUTOF10: 6
PAINLEVEL_OUTOF10: 7
PAINLEVEL_OUTOF10: 0
PAINLEVEL_OUTOF10: 0

## 2022-09-19 ASSESSMENT — PAIN DESCRIPTION - LOCATION
LOCATION: HIP;BUTTOCKS
LOCATION: BUTTOCKS;HIP
LOCATION: HIP;SACRUM

## 2022-09-19 ASSESSMENT — PAIN DESCRIPTION - PAIN TYPE: TYPE: ACUTE PAIN;CHRONIC PAIN

## 2022-09-19 ASSESSMENT — PAIN DESCRIPTION - DESCRIPTORS
DESCRIPTORS: ACHING;THROBBING
DESCRIPTORS: ACHING
DESCRIPTORS: ACHING;THROBBING

## 2022-09-19 ASSESSMENT — PAIN DESCRIPTION - ORIENTATION
ORIENTATION: LEFT

## 2022-09-19 ASSESSMENT — PAIN DESCRIPTION - FREQUENCY: FREQUENCY: CONTINUOUS

## 2022-09-19 ASSESSMENT — PAIN - FUNCTIONAL ASSESSMENT: PAIN_FUNCTIONAL_ASSESSMENT: ACTIVITIES ARE NOT PREVENTED

## 2022-09-19 NOTE — CARE COORDINATION
Notified Sonam Keita and [facility], [phone] that patient was restarted on IV vanco via picc placed 9-17-22, pending Left iliac bone biopsy. Per MD 9-18-22  Increase Suboxone to q8h scheduled, not COWS  CT guided bone biopsy tomorrow  Echo does not support endocarditis  Await MRI Brain with contrast given vision changes  On Vanco IV and PO Cipro  PICC placed 9/17/22.

## 2022-09-19 NOTE — PROGRESS NOTES
0800-Assessment complete and charted. Patient awake, alert and oriented. Patient c/o pain. Call light in reach, patient instructed to call for assistance, bathroom      1100-Patient back from CT guided bone biopsy. Site clean dry and intact with very small area of blood noted. Patient groggy from versed given for procedure    1600-Patient stated her dentures were lost in ED when she was admitted.

## 2022-09-19 NOTE — ADT AUTH CERT
Cellulitis - Care Day 6 (9/17/2022) by Michelle Kelly RN       Review Entered Review Status   9/19/2022 10:41 Completed      Criteria Review      Care Day: 6 Care Date: 9/17/2022 Level of Care: Intermediate Care    Guideline Day 3    Clinical Status    (X) * Hemodynamic stability    9/19/2022 10:41 AM EDT by Coco Alves      CO 78 /77    (X) * Afebrile or fever improved    9/19/2022 10:41 AM EDT by Coco Alves      98.6 (37)    (X) * Skin exam stable or improved    9/19/2022 10:41 AM EDT by Coco Alves      Skin color, texture, turgor normal.  No rashes or lesions    (X) * Mental status at baseline    9/19/2022 10:41 AM EDT by Darlin Cabrera and oriented, thought content appropriate, normal insight    ( ) * Antibiotic treatment needs appropriate for next level of care    ( ) * Pain absent or manageable at next level of care    9/19/2022 10:41 AM EDT by Coco Alves      pain level 8/10    ( ) * Discharge plans and education understood    Activity    ( ) * Ambulatory or acceptable for next level of care    9/19/2022 10:41 AM EDT by Juana Greenberg      Up with assistance    Routes    ( ) * Oral hydration    9/19/2022 10:41 AM EDT by Coco Alves      NPO    ( ) * Oral medications or regimen acceptable for next level of care    9/19/2022 10:41 AM EDT by Coco Alves      SL Suboxone 2-0.5mg 2 film TID  PO Neurontin 300mg q8 PRN x4  PO Vistaril 50 mg q8 PRN x2  PO Robaxin 750mg q6 PRN x1  Po Desyrel 50mg Nightly PRN x2    ( ) * Oral diet or acceptable for next level of care    9/19/2022 10:41 AM EDT by Juana Greenberg      Diet NPO Exceptions are: [employer], Sips of Water with Meds    Interventions    (X) WBC    9/19/2022 10:41 AM EDT by Coco Alves      WBC 6.0    Medications    (X) Parenteral or oral antibiotics    9/19/2022 10:41 AM EDT by Coco Alves      PO Cipro 500mg BID   IV Vancocin 1250mg every 24 hrs  IV Vancomycin 1000 mg every 12 hrs    * Milestone   Additional Notes   DATE: Sept. 17, 2022

## 2022-09-19 NOTE — PROGRESS NOTES
[facility]   Phone: [phone]    Physical Therapy    [] Initial Evaluation            [x] Daily Treatment Note         [] Discharge Summary      Patient: Arlene Travis   : 1975   MRN: [id number]   Date of Service:  2022  Admitting Diagnosis: Acute metabolic encephalopathy  Current Admission Summary: Pt is a 43 yo female with a hx of Hep C and heroin abuse. The patient presents after a fall in a parking lot. She was admitted for acute encephalopathy, sepsis, and (L) buttock pain. Pelvic x-ray showed (L) SI erosive changes and comminuted fxs on both sides of the (L) SI joint. Ortho consulted - recommending 50% (L) LE NWB with walker or crutches. ID following pt. Past Medical History:  has a past medical history of Hepatitis C and History of heroin abuse ([facility] [address] Past Surgical History:  has a past surgical history that includes  section and CT BIOPSY DEEP BONE PERCUTANEOUS (2022). Discharge Recommendations: Arlene Travis scored a 20/24 on the AM-PAC short mobility form. Current research shows that an AM-PAC score of 18 or greater is typically associated with a discharge to the patient's home setting. Based on the patient's AM-PAC score and their current functional mobility deficits, it is recommended that the patient have 2-3 sessions per week of Physical Therapy at d/c to increase the patient's independence. At this time, this patient demonstrates the endurance and safety to discharge home with home PT and a follow up treatment frequency of 2-3x/wk. Please see assessment section for further patient specific details.        HOME HEALTH CARE: LEVEL 1 STANDARD    - Initial home health evaluation to occur within 24-48 hours, in patient home   - Therapy to evaluate with goal of regaining prior level of functioning   - Therapy to evaluate if patient has [address] needs for personal care    If patient discharges prior to next session this note will serve as a discharge summary. Please see below for the latest assessment towards goals. DME Required For Discharge: rolling walker (pt states she has one, however is not sure it came with her to the ED)    Precautions/Restrictions: high fall risk, weight bearing  Weight Bearing Restrictions: partial weight bearing - 50 %  [] Right Upper Extremity  [] Left Upper Extremity [] Right Lower Extremity  [x] Left Lower Extremity     Required Braces/Orthotics: no braces required   [] Right  [] Left  Positional Restrictions:no positional restrictions    Pre-Admission Information   Lives With: significant other, . Comment: works full time     Type of Home: hotel  Home Layout: one level  Home Access: level entry  [address]: tub/shower unit  [employer]: grab bars in shower  Toilet Height: standard [address]: rolling walker  Transfer Assistance: Independent without use of device  Ambulation Assistance:Independent without use of device  ADL Assistance: independent with all ADL's  IADL Assistance: independent with homemaking tasks, go out to eat for meals, the hotel has a laundry room  Active :        [x] Yes  [] No  Hand Dominance: [] Left  [x] Right  Current Employment: unemployed  Hobbies:   Recent Falls: 1 fall prior to admission, tripped      Subjective  General: Pt supine in bed upon arrival and agreeable to working with PT. Pt reports feeling much better, denies pain at rest. Pt admits to having spent last couple of days ambulating to restroom independently without AD and violating 50% WB restriction on LLE. Pt reports that yesterday she had increased pain on L side, but knows it was due to ambulating without device. Pain: 0/10 and 8/10.   Location: L hip  Pain Interventions: patient denies pain interventions and repositioned        Functional Mobility  Bed Mobility  Supine to Sit: supervision  Scooting: supervision  Comments: Pt sat EOB with good balance. Transfers  Sit to stand transfer: contact guard assistance  Stand to sit transfer: contact guard assistance  Comments: VCs for hand placement, transfers from EOB   Ambulation  Surface:level surface  Assistive Device: rolling walker  Assistance: contact guard assistance  Distance: ~250 ft  Gait Mechanics: Step-to pattern, decreased step length, pt reporting she has been \"bird-walking\" and demonstrates ambulating on balls of feet without heel contact during any point of gait cycle. Comments:  Pt given VCs to maintain 50% WB L LE and walker proximity, cues for heel toe gait pattern. Stair Mobility  Stair mobility not completed on this date. Comments:  Wheelchair Mobility:  No w/c mobility completed on this date. Comments:  Balance  Static Sitting Balance: good: independent with functional balance in unsupported position  Dynamic Sitting Balance: good: independent with functional balance in unsupported position  Static Standing Balance: fair (-): maintains balance at SBA with use of UE support  Dynamic Standing Balance: fair (-): maintains balance at CGA with use of UE support  Comments: Pt consistently uses (B) UE support on RW for all standing balance, minimal cues need to maintain 50% (L) LE WBing in standing.     Other Therapeutic Interventions    Functional Outcomes  -PAC Inpatient Mobility Raw Score : 20              Cognition  Overall Cognitive Status: WFL  Arousal/Alterness: appropriate responses to stimuli  Following Commands: follows all commands without difficulty  Attention Span: attends with cues to redirect, difficulty dividing attention  Memory: appears intact  Safety Judgement: decreased awareness of need for safety  Problem Solving: decreased awareness of errors, assistance required to identify errors made  Insights: decreased awareness of deficits  Initiation: requires cues for some  Sequencing: requires cues for some  Comments: Pt with increased anxiety initially however seems to calm throughout session. Questionable carry over with education provided. Orientation:    alert and oriented x 4  Command Following:   [facility]    Education  Barriers To Learning: cognition and emotional  Patient Education: patient educated on goals, PT role and benefits, plan of care, precautions, weight-bearing education, general safety, functional mobility training, proper use of assistive device/equipment, disease specific education, transfer training, discharge recommendations  Learning Assessment:  patient verbalizes understanding, would benefit from continued reinforcement, patient will require reinforcement due to cognitive deficits    Assessment  Activity Tolerance: Pt tolerated treatment session well. Impairments Requiring Therapeutic Intervention: decreased functional mobility, decreased ADL status, decreased strength, decreased endurance, decreased balance, decreased IADL, increased pain, decreased posture  Prognosis: good  Clinical Assessment: Pt needing supervision for bed mob, SBA for tranfers and gait with RW. Pt is a 43 yo female admitted to [facility] for acute encephalopathy and sepsis, found to have (L) sacral and ilieal comminuted fxs. At this time the patient requires constant cues and visual feedback to consistently maintain (L) LE 50% WBing, however the patient is able to ambulate household distances. She demonstrated minimal impact from pain during the session. Recommending continued skilled PT to safely progress independence with functional mobility. Pt reporting need to go to skilled nursing facility for IV antibiotics with PICC line secondary to admitted drug abuse.   Safety Interventions: patient left in bed, bed alarm in place, call light within reach, gait belt, patient at risk for falls, telesitter in use, nurse notified, and seizure mats in use    Plan  Frequency: 5-7 x/week  Current Treatment Recommendations: strengthening, balance training, functional mobility training, transfer training, gait training, endurance training, patient/caregiver education, pain management, home exercise program, safety education, equipment evaluation/education, and positioning    Goals  Patient Goals: Go home   Short Term Goals:  Time Frame: Before discharge  Patient will complete bed mobility at Independent   Patient will complete transfers at modified independent while maintaining 50% (L)  So [facility]   Patient will ambulate 50 ft with use of rolling walker at modified independent while maintaining 50% (L) LE WBing   Patient to maintain standing at modified independent for 10 minutes while maintaining 50% (L) LE WBing in order to complete ADLs  **No goals met in full this date 9/19    Therapy Session Time      Individual Group Co-treatment   Time In  1403       Time Out  1500       Minutes  57         Total Treatment Minutes:  57 minutes       Electronically Signed By: Carmine Ayers PT  Carmine Ayers PT, DPT, [id number]

## 2022-09-19 NOTE — PROGRESS NOTES
Infectious Diseases   Progress Note      Admission Date: 9/12/2022  Hospital Day: Hospital Day: 8   Attending: Teagan Lincoln MD  Date of service: 9/19/2022     Chief complaint/ Reason for consult:     Severe sepsis on admission with tachypnea, tachycardia, bandemia and acute metabolic encephalopathy on admission  Fall before admission leading to bilateral ileal and sacral aspect fractures of the left sacroiliac joint  Concern for pathologic fractures and left sacroiliac joint septic arthritis on CT imaging  Elevated sed rate and CRP  Bibasal atelectasis    Microbiology:        I have reviewed allavailable micro lab data and cultures    Blood culture (2/2) - collected on 9/12/2022: Negative  Urine culture  - collected on 9/12/2022: Negative      Antibiotics and immunizations:       Current antibiotics: All antibiotics and their doses were reviewed by me    Recent Abx Admin                     vancomycin (VANCOCIN) 1,250 mg in dextrose 5 % 250 mL IVPB (mg) 1,250 mg New Bag 09/19/22 1358    ciprofloxacin (CIPRO) tablet 500 mg (mg) 500 mg Given 09/19/22 0824     500 mg Given 09/18/22 2125                      Immunization History: All immunization history was reviewed by me today. Immunization History   Administered Date(s) Administered    COVID-19, J&J, (age 18y+), IM, 0.5 mL 07/22/2021       Known drug allergies: All allergies were reviewed and updated    Allergies   Allergen Reactions    Penicillins Anaphylaxis       Social history:     Social History:  All social andepidemiologic history was reviewed and updated by me today as needed. Tobacco use:   reports that she has been smoking cigarettes. She has been smoking an average of 1 pack per day. She has never used smokeless tobacco.  Alcohol use:   reports that she does not currently use alcohol. Currently lives in: [address] [place] [address]    reports current drug use. Drug: Opiates .      COVID VACCINATION AND LAB RESULT RECORDS:     Internal Administration First Dose COVID-19, J&J, (age 18y+), IM, 0.5 mL  07/22/2021   Second Dose           Last COVID Lab No results found for: SARS-COV-2, SARS-COV-2 RNA, SARS-COV-2, SARS-COV-2, SARS-COV-2 BY PCR, SARS-COV-2, SARS-COV-2, SARS-COV-2         Assessment:     The patient is a 42 y.o. old female who  has a past medical history of Hepatitis C and History of heroin abuse ([facility] [address] with following problems:    Severe sepsis on admission with tachypnea, tachycardia, bandemia and acute metabolic encephalopathy on admission-resolved  Fall before admission leading to bilateral ileal and sacral aspect fractures of the left sacroiliac joint-MRI concerning for osteomyelitis-s/p left iliac bone biopsy today  Concern for pathologic fractures and left sacroiliac joint septic arthritis on CT imaging-unclear if there is infection  Elevated sed rate and CRP-this will slowly improve  Bibasal atelectasis  Chronic emphysema  IV drug user  Amphetamine and heroin abuse-counseling done  Fentanyl abuse  Cocaine abuse  Chronic hepatitis C  Need for HIV screen-this was negative on September 13      Discussion:      The patient is afebrile. She is on IV vancomycin and ciprofloxacin. She is tolerating antibiotics okay. Blood cultures from September 12 have been negative. The patient underwent left iliac bone biopsy earlier today. Serum creatinine 0.7. Plan:     Diagnostic Workup:    Follow-up on left iliac bone biopsy tissue culture and surgical pathology  Continue to follow  fever curve, WBC count and blood cultures. Continue to monitor blood counts, liver and renal function. Antimicrobials: Will continue empiric IV vancomycin. .  Check Vancomycin trough before the 5th dose. Target vancomycin trough level of 15-20  mg/L or vancomycin area under curve (AUC) of 400-600 mg*h/L by Bayesian modeling method. If dosing vancomycin based on trough levels, keep vancomycin trough below 20 at all times.   Avoid increasing the dose of all times as all fluoroquinolones have the potential of causing hypoglycemia. If these symptoms develops, patient was instructed to stop the antibiotic and call my office at [phone]. Use sunscreen when going in bright sun while on Ciprofloxacin or Levofloxacin as these antibiotics can cause photosensitivity. Take 1 hour before or 2 hour after dairy, calcium, iron, magnesium, aluminum or zinc.       TIME SPENT TODAY:     - Spent over  37 minutes on visit (including interval history, physical exam, review of data including labs, cultures, imaging, development and implementation of treatment plan and coordination of complex care). More than 50 percent of this includes face-to-face time spent with the patient for counseling and coordination of care. Thank you for involving me in the care of your patient. I will continue to follow. If you have anyadditional questions, please do not hesitate to contact me. Subjective: Interval history: Interval history was obtained from chart review and patient/ RN. She is afebrile. She is tolerating antibiotics okay. No diarrhea     REVIEW OF SYSTEMS:      Review of Systems   Constitutional:  Negative for chills, diaphoresis and fever. HENT:  Negative for ear discharge, ear pain, postnasal drip, rhinorrhea, sinus pressure, sinus pain and sore throat. Eyes:  Negative for discharge and redness. Respiratory:  Negative for cough, shortness of breath and wheezing. Cardiovascular:  Negative for chest pain and leg swelling. Gastrointestinal:  Negative for abdominal pain, constipation, diarrhea and nausea. Endocrine: Negative for cold intolerance, heat intolerance and polydipsia. Genitourinary:  Negative for dysuria, flank pain, frequency, hematuria and urgency. Musculoskeletal:  Negative for back pain and myalgias. Skin:  Negative for rash. Allergic/Immunologic: Negative for immunocompromised state.    Neurological:  Negative for dizziness, seizures and headaches. Hematological:  Does not bruise/bleed easily. Psychiatric/Behavioral:  Negative for agitation, hallucinations and suicidal ideas. The patient is not nervous/anxious. All other systems reviewed and are negative. Past Medical History: All past medical history reviewed today. Past Medical History:   Diagnosis Date    Hepatitis C     History of heroin abuse ([facility] [address]     last used 2019       Past Surgical History: All past surgical history was reviewed today. Past Surgical History:   Procedure Laterality Date     SECTION      CT BIOPSY PERCUTANEOUS DEEP BONE  2022    CT BIOPSY PERCUTANEOUS DEEP BONE 2022 [facility] CT SCAN       Family History: All family history was reviewed today. History reviewed. No pertinent family history. Objective:       PHYSICAL EXAM:      Vitals:   Vitals:    22 1030 22 1035 22 1200 22 1404   BP: 92/63 99/66 98/61    Pulse: 74 72 68    Resp: 15 25 18 16   Temp:   98.6 °F (37 °C)    TempSrc:   Oral    SpO2: 100% 100%     Weight:       Height:           Physical Exam  Vitals and nursing note reviewed. Constitutional:       Appearance: She is well-developed. She is not diaphoretic. Comments: The patient was seen earlier today. HENT:      Head: Normocephalic and atraumatic. Right Ear: External ear normal. There is no impacted cerumen. Left Ear: External ear normal. There is no impacted cerumen. Nose: Nose normal.      Mouth/Throat:      Mouth: Mucous membranes are moist.      Pharynx: Oropharynx is clear. No oropharyngeal exudate. Eyes:      General: No scleral icterus. Right eye: No discharge. Left eye: No discharge. Conjunctiva/sclera: Conjunctivae normal.      Pupils: Pupils are equal, round, and reactive to light. Neck:      Thyroid: No thyromegaly. Cardiovascular:      Rate and Rhythm: Normal rate and regular rhythm. Heart sounds: Normal heart sounds.  No murmur heard.    No friction rub. Pulmonary:      Effort: No respiratory distress. Breath sounds: No stridor. No wheezing or rales. Abdominal:      General: Bowel sounds are normal.      Palpations: Abdomen is soft. Tenderness: There is no abdominal tenderness. There is no guarding or rebound. Musculoskeletal:         General: No swelling, tenderness or deformity. Normal range of motion. Cervical back: Normal range of motion and neck supple. Right lower leg: No edema. Left lower leg: No edema. Lymphadenopathy:      Cervical: No cervical adenopathy. Skin:     General: Skin is warm and dry. Coloration: Skin is not jaundiced. Findings: No bruising, erythema or rash. Neurological:      General: No focal deficit present. Mental Status: She is alert and oriented to person, place, and time. Mental status is at baseline. Motor: No abnormal muscle tone. Psychiatric:         Mood and Affect: Mood normal.         Behavior: Behavior normal.      *    Lines and drains: All vascular access sites are healthy with no local erythema, discharge or tenderness. Intake and output:    I/O last 3 completed shifts: In: 250 [P.O.:250]  Out: -     Lab Data:   All available labs and old records have been reviewed by me.     CBC:  Recent Labs     09/17/22 0420 09/18/22  1100 09/19/22  0850   WBC 6.0 6.8 6.6   RBC 4.12 4.14 3.91*   HGB 11.7* 11.5* 11.0*   HCT 35.8* 35.4* 33.9*   * 406 376   MCV 86.9 85.7 86.8   MCH 28.4 27.8 28.3   MCHC 32.7 32.4 32.5   RDW 16.7* 16.9* 17.2*          BMP:  Recent Labs     09/17/22  0420 09/18/22  1100 09/19/22  0850   * 138 140   K 4.1 4.2 4.2    100 102   CO2 24 30 28   BUN 12 11 14   CREATININE 0.6 0.8 0.7   CALCIUM 9.2 9.6 9.5   GLUCOSE 95 120* 105*          Hepatic Function Panel:   Lab Results   Component Value Date/Time    ALKPHOS 92 09/19/2022 08:50 AM    ALT 34 09/19/2022 08:50 AM    AST 59 09/19/2022 08:50 AM    PROT 7.8 09/19/2022 08:50 AM    BILITOT <0.2 09/19/2022 08:50 AM    LABALBU 3.3 09/19/2022 08:50 AM       CPK: No results found for: CKTOTAL  ESR:   Lab Results   Component Value Date    SEDRATE 66 (H) 09/13/2022     CRP:   Lab Results   Component Value Date    CRP 94.4 (H) 09/13/2022           Imaging: All pertinent images and reports for the current visit were reviewed by me during this visit. I reviewed the chest x-ray/CT scan/MRI images and independently interpreted the findings and results today. CT BIOPSY DEEP BONE PERCUTANEOUS   Final Result   Successful CT guided core biopsy of the left iliac bone. CT GUIDED NEEDLE PLACEMENT   Final Result   Successful CT guided core biopsy of the left iliac bone. MRI BRAIN WO CONTRAST   Final Result   Minimal hyperintense FLAIR signal within the left parietal lobe   periventricular white matter. This finding likely represents minimal chronic   microvascular disease. However a demyelinating process such as multiple   sclerosis is not excluded. No significant orbital abnormality is identified. MRI PELVIS W WO CONTRAST   Final Result   Acute septic arthritis/osteomyelitis of the left sacroiliac joint, with   adjacent myositis. Pathologic fracture of the left sacral wing. Probable phlegmon extending into the epidural space of the sacral canal.         MRI LUMBAR SPINE W WO CONTRAST   Final Result   No acute abnormality of the L-spine identified. Partially visualized septic arthritis of the left sacroiliac joint. CT CHEST PULMONARY EMBOLISM W CONTRAST   Final Result   Negative examination for pulmonary embolism. Bibasilar dependent atelectasis. No focal infiltrates or pleural effusions. Emphysema and right apical subpleural bulla or blebs         CT ABDOMEN PELVIS W IV CONTRAST Additional Contrast? None   Final Result   1.  Irregularity of the left SI joint with erosive change and fracture at both   the ileal and Elevated sed rate R70.0    Septic arthritis of sacroiliac joint [facility]) M46.58    Fall W19. Jose Rosasco    Bandemia H58.468    Acute metabolic encephalopathy Y60.29    Tobacco abuse counseling Z71.6       Please note that this chart was generated using Dragon dictation software. Although every effort was made to ensure the accuracy of this automated transcription, some errors in transcription may have occurred inadvertently. If you may need any clarification, please do not hesitate to contact me through EPIC or at the phone number provided below with my electronic signature. Any pictures or media included in this note were obtained after taking informed verbal consent from the patient and with their approval to include those in the patient's medical record. Fernandez Varela MD, MPH, [address] [place] [address]  9/19/2022, 3:14 PM  [facility] Infectious Disease   [address] [place] [address], [address] [place] [address]  Office: [phone]  Fax: [phone]  In-person Clinic days:  Tuesday & Thursday a.m. Virtual clinic days: Monday, Wednesday & Friday a.m.

## 2022-09-19 NOTE — BRIEF OP NOTE
Brief Postoperative Note    Jose Maria Montano  YOB: 1975  [id number]    Pre-operative Diagnosis: osteomyelitis    Post-operative Diagnosis: Same    Procedure: Left iliac bone biopsy    Anesthesia: Moderate Sedation    Surgeons: Jordyn Lorenzo MD    Estimated Blood Loss: Less than 5 mL    Complications: None    Specimens: Was Obtained: 2 bone cores    Findings: Successful CT-guided left iliac bone biopsy.     Electronically signed by Jordyn Lorenzo MD on 9/19/2022 at 10:43 AM

## 2022-09-19 NOTE — PLAN OF CARE
Problem: Discharge Planning  Goal: Discharge to home or other facility with appropriate resources  Outcome: Progressing  Flowsheets (Taken 9/18/2022 2355)  Discharge to home or other facility with appropriate resources:   Identify barriers to discharge with patient and caregiver   Arrange for needed discharge resources and transportation as appropriate   Identify discharge learning needs (meds, wound care, etc)   Refer to discharge planning if patient needs post-hospital services based on physician order or complex needs related to functional status, cognitive ability or social support system  Note: Pt will need assistance with continuation of her sobriety. Problem: Pain  Goal: Verbalizes/displays adequate comfort level or baseline comfort level  Outcome: Progressing  Flowsheets (Taken 9/18/2022 2350)  Verbalizes/displays adequate comfort level or baseline comfort level:   Encourage patient to monitor pain and request assistance   Assess pain using appropriate pain scale   Administer analgesics based on type and severity of pain and evaluate response     Problem: Skin/Tissue Integrity  Goal: Absence of new skin breakdown  Description: 1. Monitor for areas of redness and/or skin breakdown  2. Assess vascular access sites hourly  3. Every 4-6 hours minimum:  Change oxygen saturation probe site  4. Every 4-6 hours:  If on nasal continuous positive airway pressure, respiratory therapy assess nares and determine need for appliance change or resting period. Outcome: Progressing  Note: Assisted pt with showering, pt's skin is intact no open areas noted. Pt able to apply lotion and powder. Problem: Safety - Adult  Goal: Free from fall injury  Outcome: Progressing  Flowsheets (Taken 9/16/2022 0052)  Free From Fall Injury: Instruct family/caregiver on patient safety  Note: Pt able to ambulate w/wo walker. Encouraging pt to use walker when out of bed.       Problem: ABCDS Injury Assessment  Goal: Absence of physical injury  Outcome: Progressing  Flowsheets (Taken 9/16/2022 0052)  Absence of Physical Injury: Implement safety measures based on patient assessment  Note: Pt independent. However camera in the room. Problem: Neurosensory - Adult  Goal: Achieves stable or improved neurological status  Outcome: Progressing  Flowsheets (Taken 9/18/2022 2037)  Achieves stable or improved neurological status: Assess for and report changes in neurological status  Note: Pt highly emotional at beginning of shift. Stayed with patient and heard her grievances. Talked with pt until she calmed down and it was time for her medications. Medications given. Problem: Skin/Tissue Integrity - Adult  Goal: Skin integrity remains intact  Outcome: Progressing  Flowsheets (Taken 9/18/2022 2037)  Skin Integrity Remains Intact: Monitor for areas of redness and/or skin breakdown     Problem: Musculoskeletal - Adult  Goal: Return mobility to safest level of function  Outcome: Progressing  Flowsheets (Taken 9/18/2022 2355)  Return Mobility to Safest Level of Function:   Assist with transfers and ambulation using safe patient handling equipment as needed   Assess patient stability and activity tolerance for standing, transferring and ambulating with or without assistive devices  Note: Pt ambulating independently or with walker. Pt moving better but still some weakness in her legs d/t trauma. Problem: Genitourinary - Adult  Goal: Absence of urinary retention  Outcome: Progressing  Flowsheets (Taken 9/18/2022 2037)  Absence of urinary retention:   Assess patients ability to void and empty bladder   Monitor intake/output and perform bladder scan as needed  Note: Pt able to void with out difficulty. Problem: Anxiety  Goal: Will report anxiety at manageable levels  Description: INTERVENTIONS:  1. Administer medication as ordered  2. Teach and rehearse alternative coping skills  3.  Provide emotional support with 1:1 interaction with staff  Outcome: Progressing  Flowsheets (Taken 9/18/2022 2037)  Will report anxiety at manageable levels: Administer medication as ordered  Note: Frequent rounding and conversation with patient while awake. Problem: Coping  Goal: Pt/Family able to verbalize concerns and demonstrate effective coping strategies  Description: INTERVENTIONS:  1. Assist patient/family to identify coping skills, available support systems and cultural and spiritual values  2. Provide emotional support, including active listening and acknowledgement of concerns of patient and caregivers  3. Reduce environmental stimuli, as able  4. Instruct patient/family in relaxation techniques, as appropriate  5.  Assess for spiritual pain/suffering and initiate Spiritual Care, Psychosocial Clinical Specialist consults as needed  Recent Flowsheet Documentation  Taken 9/18/2022 2037 by Michae Klinefelter, RN  Patient/family able to verbalize anxieties, fears, and concerns, and demonstrate effective coping: Provide emotional support, including active listening and acknowledgement of concerns of patient and caregivers

## 2022-09-19 NOTE — PROGRESS NOTES
0800-Assessment complete and charted.  Bed in low position, call light in reach, bed alarm on    1000-Patient in CT having bone biopsy done    1200-Patient resting comfortably    1600-Patient up walking with fiance around unit    1845-Patient off unit to MRI

## 2022-09-19 NOTE — DISCHARGE INSTR - COC
Continuity of Care Form    Patient Name: José Manrique   :  1975  MRN:  [id number]    Admit date:  2022  Discharge date:  2022    Code Status Order: Full Code   Advance Directives:     Admitting Physician:  Billy Jama DO  PCP: No primary care provider on file. Discharging Nurse: [facility] Unit/Room#: 0GA-1180/0457-34  Discharging Unit Phone Number: [phone]    Emergency Contact:   Extended Emergency Contact Information  Primary Emergency Contact: Neto Burton  [place] Phone: [phone]  Relation: Spouse   needed? No    Past Surgical History:  Past Surgical History:   Procedure Laterality Date     SECTION      CT BIOPSY PERCUTANEOUS DEEP BONE  2022    CT BIOPSY PERCUTANEOUS DEEP BONE 2022 [facility] CT SCAN       Immunization History:   Immunization History   Administered Date(s) Administered    COVID-19, J&J, (age 18y+), IM, 0.5 mL 2021       Active Problems:  Patient Active Problem List   Diagnosis Code    Encephalopathy G93.40    Polysubstance abuse ([address] F19.10    Acute cystitis without hematuria N30.00    Cellulitis of buttock L03.317    Sacral fracture, closed ([address] S32.10XA    Hypoglycemia E16.2    Severe sepsis ([address] A41.9, R65.20    Unwitnessed fall R29.6    Pulmonary emphysema ([address] J43.9    Mild bibasilar atelectasis J98.11    Amphetamine abuse ([address] F15.10    Cocaine abuse ([address] F14.10    Fentanyl use disorder, mild, abuse ([address] F11.10    Drug abuse counseling and surveillance of drug abuser Z71.51    Chronic hepatitis C without hepatic coma ([address] B18.2    Elevated C-reactive protein (CRP) R79.82    Elevated sed rate R70.0    Septic arthritis of sacroiliac joint [facility]) M46.58    Fall W19. Grady Punt    Bandemia S91.517    Acute metabolic encephalopathy T14.52    Tobacco abuse counseling Z71.6       Isolation/Infection:   Isolation            No Isolation          Patient Infection Status       None to display            Nurse Assessment:  Last Vital Signs: BP 98/61   Pulse 68   Temp 98.6 °F (37 °C) (Oral)   Resp 16   Ht 5' (1.524 m)   Wt 101 lb 9.6 oz (46.1 kg)   LMP  (LMP Unknown)   SpO2 100%   BMI 19.84 kg/m²     Last documented pain score (0-10 scale): Pain Level: 6  Last Weight:   Wt Readings from Last 1 Encounters:   09/19/22 101 lb 9.6 oz (46.1 kg)     Mental Status:  alert    IV Access:  - None    Nursing Mobility/ADLs:  Walking   Independent  Transfer  Independent  Bathing  Independent  Dressing  Independent  [address]  Independent  Med Delivery   whole    Wound Care Documentation and Therapy:        Elimination:  Continence: Bowel: Yes  Bladder: Yes  Urinary Catheter: None   Colostomy/Ileostomy/Ileal Conduit: No       Date of Last BM: ***    Intake/Output Summary (Last 24 hours) at 9/19/2022 1516  Last data filed at 9/18/2022 2350  Gross per 24 hour   Intake 250 ml   Output --   Net 250 ml     I/O last 3 completed shifts: In: 250 [P.O.:250]  Out: -     Safety Concerns: At Risk for Falls    Impairments/Disabilities:      None    Nutrition Therapy:  Current Nutrition Therapy:   - Oral Diet:  General    Routes of Feeding: Oral  Liquids: No Restrictions  Daily Fluid Restriction: no  Last Modified Barium Swallow with Video (Video Swallowing Test): not done    Treatments at the Time of Hospital Discharge:   Respiratory Treatments: ***  Oxygen Therapy:  is not on home oxygen therapy.   Ventilator:    - No ventilator support    Rehab Therapies: Physical Therapy and Occupational Therapy  Weight Bearing Status/Restrictions: No weight bearing restrictions  Other Medical Equipment (for information only, NOT a DME order):  walker  Other Treatments: ***    Patient's personal belongings (please select all that are sent with patient):  {[facility] DME Belongings:[id number]}    RN SIGNATURE:  Electronically signed by Sumi Ramirez RN on 9/21/22 at 12:26 PM EDT    CASE MANAGEMENT/SOCIAL

## 2022-09-19 NOTE — PROGRESS NOTES
Hospitalist Progress Note      PCP: No primary care provider on file. Date of Admission: 9/12/2022    Chief Complaint:  [facility] Course: 43 yo F with Hep C, h/o cocaine abuse, fentanyl abuse, amphetamine abuse was brought to ER for AMS. Admitted as inpatient for acute metabolic encephalopathy, L sacroiliitis, sacral and ileal fractures and leukocytosis. Followed by ID and Ortho. For MRI Pelvis and L spine. Started on IV Abx. Echo:  Normal left ventricle size, wall thickness, and systolic function with an   estimated ejection fraction of 60%. No regional wall motion abnormalities are seen. Right atrial yuridia terminalis more prominent. No suggestion of vegetation. IR unable to aspirate SI joint as no fluid noted per IR attending. Changed to Doxycycine on 9/15. MRI Pelvis     Acute septic arthritis/osteomyelitis of the left sacroiliac joint, with   adjacent myositis. Pathologic fracture of the left sacral wing. Probable phlegmon extending into the epidural space of the sacral canal.      Neurosurgery consulted for sacral epidural phlegmon/abscess; recommend   \"Treatment consists of IV antibiotics at the direction of ID. Agree with or through recommendations that in the future she may need SI joint fusion after she has fully recovered from the infection. \"    MRI L spine:  No acute abnormality of the L-spine identified. Partially visualized septic arthritis of the left sacroiliac joint. Patient with R eye vision changes. MRI Brain   Minimal hyperintense FLAIR signal within the left parietal lobe   periventricular white matter. This finding likely represents minimal chronic   microvascular disease. However a demyelinating process such as multiple   sclerosis is not excluded. No significant orbital abnormality is identified. MRI Brain with and w/o contrast requested to r/o infection or MS. PICC placed on 9/17/22.     On Vanco IV and PO Cipro.    Underwent IR L iliac bone biopsy on 9/19. Subjective:  Patient wants more Suboxone again today. No CP, SOB, HA or abdominal pain. No fevers. Still complaining of back pain      Medications:  Reviewed    Infusion Medications    sodium chloride 100 mL/hr at 09/18/22 0212    sodium chloride 25 mL (09/17/22 0342)     Scheduled Medications    acetaminophen  650 mg Oral Q6H    buprenorphine-naloxone  2 Film SubLINGual TID    vancomycin  1,250 mg IntraVENous Q24H    lidocaine 1 % injection  5 mL IntraDERmal Once    sodium chloride flush  5-40 mL IntraVENous 2 times per day    ciprofloxacin  500 mg Oral 2 times per day    lactobacillus  1 capsule Oral BID WC    sodium chloride flush  10 mL IntraVENous 2 times per day    lamoTRIgine  100 mg Oral Daily     PRN Meds: diazePAM, sodium chloride flush, sodium chloride, methocarbamol, dicyclomine, gabapentin, hydrOXYzine pamoate, promethazine, traZODone, sodium chloride flush, sodium chloride, magnesium sulfate, promethazine **OR** ondansetron, acetaminophen **OR** acetaminophen      Intake/Output Summary (Last 24 hours) at 9/19/2022 0745  Last data filed at 9/18/2022 2350  Gross per 24 hour   Intake 250 ml   Output --   Net 250 ml         Physical Exam Performed:    BP (!) 93/57   Pulse 76   Temp 98.1 °F (36.7 °C) (Oral)   Resp 16   Ht 5' (1.524 m)   Wt 103 lb 4.8 oz (46.9 kg)   LMP  (LMP Unknown)   SpO2 95%   BMI 20.17 kg/m²     General appearance: No apparent distress, appears stated age and cooperative. HEENT: Pupils equal, round, and reactive to light. Conjunctivae/corneas clear. Neck: Supple, with full range of motion. No jugular venous distention. Trachea midline. Respiratory:  Normal respiratory effort. Clear to auscultation, bilaterally without Rales/Wheezes/Rhonchi. Cardiovascular: Regular rate and rhythm with normal S1/S2 without murmurs, rubs or gallops. Abdomen: Soft, non-tender, non-distended with normal bowel sounds.   Musculoskeletal: L-spine identified. Partially visualized septic arthritis of the left sacroiliac joint. CT CHEST PULMONARY EMBOLISM W CONTRAST   Final Result   Negative examination for pulmonary embolism. Bibasilar dependent atelectasis. No focal infiltrates or pleural effusions. Emphysema and right apical subpleural bulla or blebs         CT ABDOMEN PELVIS W IV CONTRAST Additional Contrast? None   Final Result   1. Irregularity of the left SI joint with erosive change and fracture at both   the ileal and sacral aspects. This may reflect septic arthritis with   pathologic fractures at those sites. 2. Moderate body wall edema. CT CERVICAL SPINE WO CONTRAST   Final Result   No acute abnormality of the cervical spine. .      Multilevel degenerative disc disease and spurring         CT HEAD WO CONTRAST   Final Result   No acute intracranial abnormality.          CT BIOPSY DEEP BONE PERCUTANEOUS    (Results Pending)   MRI BRAIN W WO CONTRAST    (Results Pending)           Assessment/Plan:    Active Hospital Problems    Diagnosis     Tobacco abuse counseling [Z71.6]      Priority: Medium    Polysubstance abuse ([address] [F19.10]      Priority: Medium    Acute cystitis without hematuria [N30.00]      Priority: Medium    Cellulitis of buttock [K02.686]      Priority: Medium    Sacral fracture, closed ([address] [S32.10XA]      Priority: Medium    Hypoglycemia [E16.2]      Priority: Medium    Severe sepsis ([address] [A41.9, R65.20]      Priority: Medium    Unwitnessed fall [R29.6]      Priority: Medium    Pulmonary emphysema ([address] [J43.9]      Priority: Medium    Mild bibasilar atelectasis [J98.11]      Priority: Medium    Amphetamine abuse ([address] [F15.10]      Priority: Medium    Cocaine abuse ([address] [F14.10]      Priority: Medium    Fentanyl use disorder, mild, abuse ([address] [F11.10]      Priority: Medium    Drug abuse counseling and surveillance of drug abuser [Z71.51]      Priority: Medium    Chronic hepatitis C without hepatic coma ([facility] [B18.2]      Priority: Medium    Elevated C-reactive protein (CRP) [R79.82]      Priority: Medium    Elevated sed rate [R70.0]      Priority: Medium    Septic arthritis of sacroiliac joint ([facility] [M46.58]      Priority: Medium    Fall [W19. XXXA]      Priority: Medium    Bandemia [D72.825]      Priority: Medium    Acute metabolic encephalopathy [I54.91]      Priority: Medium    Encephalopathy [G93.40]      Priority: Medium     Continue Suboxone q8h  CT guided bone biopsy today  Echo does not support endocarditis  Await MRI Brain with contrast given vision changes  On Vanco IV and PO Cipro  PICC placed 9/17/22  Start Tylenol q6h  Valium PRN anxiety  Consider Vivitrol IM as inpatient if possible  Repleted K  Counseled on drug cessation  Patient willing to go to SNF upon DC with PICC    DVT Prophylaxis: Lovenox  Diet: Diet NPO Exceptions are: Sips of Water with Meds  Code Status: Full Code  PT/OT Eval Status: Following    Dispo - SNF    Discussed with patient and nursing. Awaiting MRI Brain with contrast and CT guided bone bx results. Needs SNF placement for IV Abx.     Katrin Pisano MD

## 2022-09-19 NOTE — PRE SEDATION
Sedation Pre-Procedure Note    Patient Name: Uriel Holden   YOB: 1975  Room/Bed: [address] [place], [place] [address]22  Medical Record Number: [id number]  Date: 2022   Time: 10:41 AM       Indication:  Bone biopsy. Consent: I have discussed with the patient and/or the patient representative the indication, alternatives, and the possible risks and/or complications of the planned procedure and the anesthesia methods. The patient and/or patient representative appear to understand and agree to proceed. Vital Signs:   Vitals:    22 1035   BP: 99/66   Pulse: 72   Resp: 25   Temp:    SpO2: 100%       Past Medical History:   has a past medical history of Hepatitis C and History of heroin abuse ([facility] [address] Past Surgical History:   has a past surgical history that includes  section. Medications:   Scheduled Meds:    acetaminophen  650 mg Oral Q6H    buprenorphine-naloxone  2 Film SubLINGual TID    vancomycin  1,250 mg IntraVENous Q24H    lidocaine 1 % injection  5 mL IntraDERmal Once    sodium chloride flush  5-40 mL IntraVENous 2 times per day    ciprofloxacin  500 mg Oral 2 times per day    lactobacillus  1 capsule Oral BID WC    sodium chloride flush  10 mL IntraVENous 2 times per day    lamoTRIgine  100 mg Oral Daily     Continuous Infusions:    sodium chloride 100 mL/hr at 22 0212    sodium chloride 25 mL (22 0342)     PRN Meds: diazePAM, sodium chloride flush, sodium chloride, methocarbamol, dicyclomine, gabapentin, hydrOXYzine pamoate, promethazine, traZODone, sodium chloride flush, sodium chloride, magnesium sulfate, promethazine **OR** ondansetron, acetaminophen **OR** acetaminophen  Home Meds:   Prior to Admission medications    Medication Sig Start Date End Date Taking? Authorizing Provider   methadone (METHADOSE) 40 MG disintegrating tablet Take 95 mg by mouth daily.   Patient not taking: Reported on 2022    Historical Provider, MD   lamoTRIgine (LAMICTAL) 100 MG tablet Take 100 mg by mouth daily  Patient not taking: No sig reported    Historical Provider, MD     Coumadin Use Last 7 Days:  no  Antiplatelet drug therapy use last 7 days: no  Other anticoagulant use last 7 days: no  Additional Medication Information:  n/a      Pre-Sedation Documentation and Exam:   I have reviewed the patient's history and review of systems.     Mallampati Airway Assessment:  Mallampati Class II - (soft palate, fauces & uvula are visible)    Prior History of Anesthesia Complications:   none    ASA Classification:  Class 2 - A normal healthy patient with mild systemic disease    Sedation/ Anesthesia Plan:   intravenous sedation    Medications Planned:   midazolam (Versed) intravenously and fentanyl intravenously    Patient is an appropriate candidate for plan of sedation: yes    Electronically signed by Amena Ward MD on 9/19/2022 at 10:41 AM

## 2022-09-20 LAB
A/G RATIO: 0.8 (ref 1.1–2.2)
ALBUMIN SERPL-MCNC: 3.1 G/DL (ref 3.4–5)
ALP BLD-CCNC: 110 U/L (ref 40–129)
ALT SERPL-CCNC: 30 U/L (ref 10–40)
ANION GAP SERPL CALCULATED.3IONS-SCNC: 9 MMOL/L (ref 3–16)
AST SERPL-CCNC: 56 U/L (ref 15–37)
BASOPHILS ABSOLUTE: 0.1 K/UL (ref 0–0.2)
BASOPHILS RELATIVE PERCENT: 1.1 %
BILIRUB SERPL-MCNC: <0.2 MG/DL (ref 0–1)
BUN BLDV-MCNC: 20 MG/DL (ref 7–20)
CALCIUM SERPL-MCNC: 9.1 MG/DL (ref 8.3–10.6)
CHLORIDE BLD-SCNC: 101 MMOL/L (ref 99–110)
CO2: 28 MMOL/L (ref 21–32)
CREAT SERPL-MCNC: 0.8 MG/DL (ref 0.6–1.1)
EOSINOPHILS ABSOLUTE: 0.2 K/UL (ref 0–0.6)
EOSINOPHILS RELATIVE PERCENT: 2.5 %
GFR AFRICAN AMERICAN: >60
GFR NON-AFRICAN AMERICAN: >60
GLUCOSE BLD-MCNC: 94 MG/DL (ref 70–99)
HCT VFR BLD CALC: 31.1 % (ref 36–48)
HEMOGLOBIN: 10.3 G/DL (ref 12–16)
LYMPHOCYTES ABSOLUTE: 2 K/UL (ref 1–5.1)
LYMPHOCYTES RELATIVE PERCENT: 24.6 %
MCH RBC QN AUTO: 28.7 PG (ref 26–34)
MCHC RBC AUTO-ENTMCNC: 33.2 G/DL (ref 31–36)
MCV RBC AUTO: 86.5 FL (ref 80–100)
MONOCYTES ABSOLUTE: 0.6 K/UL (ref 0–1.3)
MONOCYTES RELATIVE PERCENT: 7.2 %
NEUTROPHILS ABSOLUTE: 5.2 K/UL (ref 1.7–7.7)
NEUTROPHILS RELATIVE PERCENT: 64.6 %
PDW BLD-RTO: 17 % (ref 12.4–15.4)
PLATELET # BLD: 296 K/UL (ref 135–450)
PMV BLD AUTO: 6.6 FL (ref 5–10.5)
POTASSIUM REFLEX MAGNESIUM: 4.2 MMOL/L (ref 3.5–5.1)
RBC # BLD: 3.59 M/UL (ref 4–5.2)
SODIUM BLD-SCNC: 138 MMOL/L (ref 136–145)
TOTAL PROTEIN: 6.9 G/DL (ref 6.4–8.2)
VANCOMYCIN RANDOM: 10.3 UG/ML
WBC # BLD: 8.1 K/UL (ref 4–11)

## 2022-09-20 PROCEDURE — 97530 THERAPEUTIC ACTIVITIES: CPT

## 2022-09-20 PROCEDURE — 85025 COMPLETE CBC W/AUTO DIFF WBC: CPT

## 2022-09-20 PROCEDURE — 94760 N-INVAS EAR/PLS OXIMETRY 1: CPT

## 2022-09-20 PROCEDURE — 80202 ASSAY OF VANCOMYCIN: CPT

## 2022-09-20 PROCEDURE — 6370000000 HC RX 637 (ALT 250 FOR IP): Performed by: INTERNAL MEDICINE

## 2022-09-20 PROCEDURE — 99233 SBSQ HOSP IP/OBS HIGH 50: CPT | Performed by: INTERNAL MEDICINE

## 2022-09-20 PROCEDURE — 6360000002 HC RX W HCPCS: Performed by: INTERNAL MEDICINE

## 2022-09-20 PROCEDURE — 80053 COMPREHEN METABOLIC PANEL: CPT

## 2022-09-20 PROCEDURE — 97110 THERAPEUTIC EXERCISES: CPT

## 2022-09-20 PROCEDURE — 2580000003 HC RX 258: Performed by: INTERNAL MEDICINE

## 2022-09-20 PROCEDURE — 97116 GAIT TRAINING THERAPY: CPT

## 2022-09-20 PROCEDURE — 2060000000 HC ICU INTERMEDIATE R&B

## 2022-09-20 RX ORDER — DIAZEPAM 5 MG/1
5 TABLET ORAL EVERY 6 HOURS PRN
Status: DISCONTINUED | OUTPATIENT
Start: 2022-09-20 | End: 2022-09-21 | Stop reason: HOSPADM

## 2022-09-20 RX ADMIN — BUPRENORPHINE AND NALOXONE 2 FILM: 2; .5 FILM BUCCAL; SUBLINGUAL at 14:18

## 2022-09-20 RX ADMIN — CIPROFLOXACIN 500 MG: 500 TABLET, FILM COATED ORAL at 08:23

## 2022-09-20 RX ADMIN — DIAZEPAM 5 MG: 5 TABLET ORAL at 17:27

## 2022-09-20 RX ADMIN — LAMOTRIGINE 100 MG: 100 TABLET ORAL at 08:21

## 2022-09-20 RX ADMIN — Medication 10 ML: at 20:42

## 2022-09-20 RX ADMIN — DIAZEPAM 5 MG: 5 TABLET ORAL at 04:55

## 2022-09-20 RX ADMIN — TRAZODONE HYDROCHLORIDE 50 MG: 50 TABLET ORAL at 21:22

## 2022-09-20 RX ADMIN — ACETAMINOPHEN 650 MG: 325 TABLET ORAL at 01:04

## 2022-09-20 RX ADMIN — Medication 1 CAPSULE: at 17:28

## 2022-09-20 RX ADMIN — GABAPENTIN 300 MG: 300 CAPSULE ORAL at 06:08

## 2022-09-20 RX ADMIN — GABAPENTIN 300 MG: 300 CAPSULE ORAL at 11:34

## 2022-09-20 RX ADMIN — Medication 10 ML: at 08:36

## 2022-09-20 RX ADMIN — ACETAMINOPHEN 650 MG: 325 TABLET ORAL at 14:18

## 2022-09-20 RX ADMIN — HYDROXYZINE PAMOATE 50 MG: 25 CAPSULE ORAL at 06:08

## 2022-09-20 RX ADMIN — HYDROXYZINE PAMOATE 50 MG: 25 CAPSULE ORAL at 21:22

## 2022-09-20 RX ADMIN — BUPRENORPHINE AND NALOXONE 2 FILM: 2; .5 FILM BUCCAL; SUBLINGUAL at 08:21

## 2022-09-20 RX ADMIN — DIAZEPAM 5 MG: 5 TABLET ORAL at 11:36

## 2022-09-20 RX ADMIN — Medication 10 ML: at 08:34

## 2022-09-20 RX ADMIN — ACETAMINOPHEN 650 MG: 325 TABLET ORAL at 08:21

## 2022-09-20 RX ADMIN — DIAZEPAM 5 MG: 5 TABLET ORAL at 23:52

## 2022-09-20 RX ADMIN — GABAPENTIN 300 MG: 300 CAPSULE ORAL at 21:22

## 2022-09-20 RX ADMIN — PROMETHAZINE HYDROCHLORIDE 25 MG: 25 TABLET ORAL at 01:04

## 2022-09-20 RX ADMIN — Medication 1 CAPSULE: at 08:21

## 2022-09-20 RX ADMIN — VANCOMYCIN HYDROCHLORIDE 1500 MG: 10 INJECTION, POWDER, LYOPHILIZED, FOR SOLUTION INTRAVENOUS at 14:27

## 2022-09-20 RX ADMIN — Medication 10 ML: at 20:41

## 2022-09-20 ASSESSMENT — ENCOUNTER SYMPTOMS
WHEEZING: 0
SINUS PRESSURE: 0
CONSTIPATION: 0
COUGH: 0
DIARRHEA: 0
RHINORRHEA: 0
BACK PAIN: 0
EYE DISCHARGE: 0
EYE REDNESS: 0
SHORTNESS OF BREATH: 0
SORE THROAT: 0
SINUS PAIN: 0
NAUSEA: 0
ABDOMINAL PAIN: 0

## 2022-09-20 ASSESSMENT — PAIN SCALES - GENERAL
PAINLEVEL_OUTOF10: 7
PAINLEVEL_OUTOF10: 5
PAINLEVEL_OUTOF10: 0
PAINLEVEL_OUTOF10: 0
PAINLEVEL_OUTOF10: 8

## 2022-09-20 ASSESSMENT — PAIN DESCRIPTION - ORIENTATION: ORIENTATION: LEFT

## 2022-09-20 ASSESSMENT — PAIN DESCRIPTION - LOCATION
LOCATION: HIP
LOCATION: HIP;BUTTOCKS

## 2022-09-20 ASSESSMENT — PAIN DESCRIPTION - DESCRIPTORS: DESCRIPTORS: THROBBING

## 2022-09-20 NOTE — PROGRESS NOTES
session this note will serve as a discharge summary. Please see below for the latest assessment towards goals. DME Required For Discharge: rolling walker (pt states she has one, however is not sure it came with her to the ED)    Precautions/Restrictions: high fall risk, weight bearing  Weight Bearing Restrictions: partial weight bearing - 50 %  [] Right Upper Extremity  [] Left Upper Extremity [] Right Lower Extremity  [x] Left Lower Extremity     Required Braces/Orthotics: no braces required   [] Right  [] Left  Positional Restrictions:no positional restrictions    Pre-Admission Information   Lives With: significant other, . Comment: works full time     Type of Home: hotel  Home Layout: one level  Home Access: level entry  [address]: tub/shower unit  [employer]: grab bars in shower  Toilet Height: standard [address]: rolling walker  Transfer Assistance: Independent without use of device  Ambulation Assistance:Independent without use of device  ADL Assistance: independent with all ADL's  IADL Assistance: independent with homemaking tasks, go out to eat for meals, the hotel has a laundry room  Active :        [x] Yes  [] No  Hand Dominance: [] Left  [x] Right  Current Employment: unemployed  Hobbies:   Recent Falls: 1 fall prior to admission, tripped      Subjective  General: Pt supine in bed upon arrival and agreeable to working with PT. Pt reporting decreased pain and increased ability to amb. Nursing in room upon arrival.  Pain: 0/10 at rest, slight pain noted upon standing but did not rate. Pain Interventions: patient denies pain interventions and repositioned        Functional Mobility  Bed Mobility  Supine to Sit: supervision  Scooting: supervision  Comments: Pt sat EOB with good balance. Transfers  Sit to stand transfer: stand by assistance  Stand to sit transfer: stand by assistance  Comments: VCs for hand placement, transfers from EOB, VCs for RW use in standing. Ambulation  Surface:level surface  Assistive Device: rolling walker  Assistance: contact guard assistance  Distance: ~250 ft, 50 ft  Gait Mechanics: Step-to pattern, decreased step length, VCs to keep inside RW and not let it get ahead of her. Comments:  Pt taking 1 standing rest break, and one seated rest break during amb. Tolerated well. Stair Mobility  Stair mobility not completed on this date. Comments:  Wheelchair Mobility:  No w/c mobility completed on this date. Comments:  Balance  Static Sitting Balance: good: independent with functional balance in unsupported position  Dynamic Sitting Balance: good: independent with functional balance in unsupported position  Static Standing Balance: fair (-): maintains balance at SBA with use of UE support  Dynamic Standing Balance: fair (-): maintains balance at CGA with use of UE support  Comments: Pt consistently uses (B) UE support on RW for all standing balance, minimal cues need to maintain 50% (L) LE WBing in standing. Other Therapeutic Interventions  Reviewed WB precautions throughout treatment. Reviewed there ex and pt unable to recall. Photo handout given of AP, QS, GS, LAQs and discussed, pt verbalized understanding, but would benefit from continued education. Discussed DC recommendations and complaints. Case management visit requested.   Functional Outcomes  AM-PAC Inpatient Mobility Raw Score : 21              Cognition  Overall Cognitive Status: WFL  Arousal/Alterness: appropriate responses to stimuli  Following Commands: follows all commands without difficulty  Attention Span: attends with cues to redirect, difficulty dividing attention  Memory: appears intact  Safety Judgement: decreased awareness of need for safety  Problem Solving: decreased awareness of errors, assistance required to identify errors made  Insights: decreased awareness of deficits  Initiation: requires cues for some  Sequencing: requires cues for some  Comments: Pt with increased anxiety initially however seems to calm throughout session. Questionable carry over with education provided. Orientation:    alert and oriented x 4  Command Following:   [place] [facility]    Education  Barriers To Learning: cognition and emotional  Patient Education: patient educated on goals, PT role and benefits, plan of care, precautions, weight-bearing education, general safety, functional mobility training, proper use of assistive device/equipment, disease specific education, transfer training, discharge recommendations  Learning Assessment:  patient verbalizes understanding, would benefit from continued reinforcement, patient will require reinforcement due to cognitive deficits    Assessment  Activity Tolerance: Pt tolerated treatment session well. Impairments Requiring Therapeutic Intervention: decreased functional mobility, decreased ADL status, decreased strength, decreased endurance, decreased balance, decreased IADL, increased pain, decreased posture  Prognosis: good  Clinical Assessment: Pt needing mod I for bed mob, SBA for tranfers and CGA gait with RW. Photo handout given for there ex. Pt is a 43 yo female admitted to [facility] for acute encephalopathy and sepsis, found to have (L) sacral and ilieal comminuted fxs. At this time the patient requires constant cues and visual feedback to consistently maintain (L) LE 50% WBing, however the patient is able to ambulate household distances. She demonstrated minimal impact from pain during the session. Recommending continued skilled PT to safely progress independence with functional mobility. Pt reporting need to go to skilled nursing facility for IV antibiotics with PICC line secondary to admitted drug abuse.   Safety Interventions: patient left in bed, bed alarm in place, call light within reach, gait belt, patient at risk for falls, telesitter in use, nurse notified, and seizure mats in use    Plan  Frequency: 5-7 x/week  Current Treatment Recommendations: strengthening, balance training, functional mobility training, transfer training, gait training, endurance training, patient/caregiver education, pain management, home exercise program, safety education, equipment evaluation/education, and positioning    Goals  Patient Goals: Go home   Short Term Goals:  Time Frame: Before discharge  Patient will complete bed mobility at Independent   Patient will complete transfers at modified independent while maintaining 50% (L)  So [facility]   Patient will ambulate 50 ft with use of rolling walker at modified independent while maintaining 50% (L) LE WBing   Patient to maintain standing at modified independent for 10 minutes while maintaining 50% (L) LE WBing in order to complete ADLs  **No goals met in full this date 9/20    Therapy Session Time      Individual Group Co-treatment   Time In 0836       Time Out 0921       Minutes 45         Total Treatment Minutes:  45 minutes       Electronically Signed By: Wes Arias, [id number]

## 2022-09-20 NOTE — PROGRESS NOTES
First Dose COVID-19, J&J, (age 18y+), IM, 0.5 mL  07/22/2021   Second Dose           Last COVID Lab No results found for: SARS-COV-2, SARS-COV-2 RNA, SARS-COV-2, SARS-COV-2, SARS-COV-2 BY PCR, SARS-COV-2, SARS-COV-2, SARS-COV-2         Assessment:     The patient is a 42 y.o. old female who  has a past medical history of Hepatitis C and History of heroin abuse ([facility] [address] with following problems:    Severe sepsis on admission with tachypnea, tachycardia, bandemia and acute metabolic encephalopathy on admission-this is resolved  Fall before admission leading to bilateral ileal and sacral aspect fractures of the left sacroiliac joint-MRI concerning for osteomyelitis-s/p left iliac bone biopsy on 9/19/2022, results awaited  Concern for pathologic fractures and left sacroiliac joint septic arthritis on CT imaging-unclear if there is infection  Elevated sed rate and CRP-should improve slowly  Bibasal atelectasis  Chronic emphysema  IV drug user  Amphetamine and heroin abuse-counseling done  Fentanyl abuse  Cocaine abuse  Chronic hepatitis C  Need for HIV screen-this was negative on September 13      Discussion:      The patient is afebrile. She is on IV vancomycin and ciprofloxacin. Blood cultures from 9/12/2022 remain negative. Serum creatinine 0.8 today. AST is 56. ALT is 30. White cell count is 8100. Hemoglobin is 10.3 and platelet count is 327,139. MRI of the brain with contrast done on 9/19/2022 was unremarkable. Plan:     Diagnostic Workup:    Follow-up on iliac bone surgical pathology report. Unclear condition was sent for cultures  Continue to follow  fever curve, WBC count and blood cultures. Continue to monitor blood counts, liver and renal function. Antimicrobials:     Will continue empiric IV vancomycin  Target vancomycin random level of 15-20  The patient had a PICC line placed on 9/17/2022  However, if no evidence of MRSA or any other cultures, will plan to stop IV vancomycin at discharge  Continue p.o. Cipro 500 mg every 12 hour  Continue oral probiotic twice daily  We will follow up on the culture results and clinical progress and will make further recommendations accordingly. Continue close vitals monitoring. Maintain good glycemic control. Fall precautions. Aspiration precautions. Continue to watch for new fever or diarrhea. DVT prophylaxis. Discussed all above with patient and RN. Drug Monitoring:    Continue monitoring for antibiotic toxicity as follows: CBC, CMP, QTc interval  Continue to watch for following: new or worsening fever, new hypotension, hives, lip swelling and redness or purulence at vascular access sites. I/v access Management:    Continue to monitor i.v access sites for erythema, induration, discharge or tenderness. As always, continue efforts to minimize tubes/lines/drains as clinically appropriate to reduce chances of line associated infections. Patient education and counseling: The patient was educated in detail about the side-effects of various antibiotics and things to watch for like new rashes, lip swelling, severe reaction, worsening diarrhea, break through fever etc.  Discussed patient's condition and what to expect. All of the patient's questions were addressed in a satisfactory manner and patient verbalized understanding all instructions. Level of complexity of visit and medical decision making: High     Illicit drug use and tobacco use disorder counseling:    I have counseled the patient extensively about risks of using illicit drugs and have encouraged the patient to maintain a healthy drug-free lifestyle. Information was given about various substance use prevention programs available in the area and their websites including www. addicted. org, www.madd. org, www.catsober. org, www.the[employer]appe. com and www. addictionservicescouncil.org.  I have counseled the patient extensively about risks of smoking and have encouraged the patient to maintain a healthy smoke-free lifestyle. Information was given about various smoking cessation programs and their websites like www.smokefree.gov, [place]. quitlogix. org as well as help lines like 1-800-QUIT-NOW and 1-800-LUNG-[place]. TIME SPENT TODAY:     - Spent over  37 minutes on visit (including interval history, physical exam, review of data including labs, cultures, imaging, development and implementation of treatment plan and coordination of complex care). More than 50 percent of this includes face-to-face time spent with the patient for counseling and coordination of care. Thank you for involving me in the care of your patient. I will continue to follow. If you have anyadditional questions, please do not hesitate to contact me. Subjective: Interval history: Interval history was obtained from chart review and patient/ RN. The patient is afebrile. She is tolerating antibiotics okay. No diarrhea     REVIEW OF SYSTEMS:      Review of Systems   Constitutional:  Negative for chills, diaphoresis and fever. HENT:  Negative for ear discharge, ear pain, postnasal drip, rhinorrhea, sinus pressure, sinus pain and sore throat. Eyes:  Negative for discharge and redness. Respiratory:  Negative for cough, shortness of breath and wheezing. Cardiovascular:  Negative for chest pain and leg swelling. Gastrointestinal:  Negative for abdominal pain, constipation, diarrhea and nausea. Endocrine: Negative for cold intolerance, heat intolerance and polydipsia. Genitourinary:  Negative for dysuria, flank pain, frequency, hematuria and urgency. Musculoskeletal:  Negative for back pain and myalgias. Skin:  Negative for rash. Allergic/Immunologic: Negative for immunocompromised state. Neurological:  Negative for dizziness, seizures and headaches. Hematological:  Does not bruise/bleed easily. Psychiatric/Behavioral:  Negative for agitation, hallucinations and suicidal ideas.  The patient is not nervous/anxious. All other systems reviewed and are negative. Past Medical History: All past medical history reviewed today. Past Medical History:   Diagnosis Date    Hepatitis C     History of heroin abuse ([address]     last used 2019       Past Surgical History: All past surgical history was reviewed today. Past Surgical History:   Procedure Laterality Date     SECTION      CT BIOPSY PERCUTANEOUS DEEP BONE  2022    CT BIOPSY PERCUTANEOUS DEEP BONE 2022 [facility] CT SCAN       Family History: All family history was reviewed today. History reviewed. No pertinent family history. Objective:       PHYSICAL EXAM:      Vitals:   Vitals:    22 0737 22 1030 22 1045 22 1515   BP:   106/68 103/66   Pulse:   86 90   Resp:  16 16 16   Temp:   98.3 °F (36.8 °C) 97.6 °F (36.4 °C)   TempSrc:   Oral Oral   SpO2:  97% 97% 99%   Weight: 101 lb 1.6 oz (45.9 kg)      Height:           Physical Exam  Vitals and nursing note reviewed. Constitutional:       Appearance: She is well-developed. She is not diaphoretic. Comments: The patient was seen earlier today. HENT:      Head: Normocephalic and atraumatic. Right Ear: External ear normal. There is no impacted cerumen. Left Ear: External ear normal. There is no impacted cerumen. Nose: Nose normal.      Mouth/Throat:      Mouth: Mucous membranes are moist.      Pharynx: Oropharynx is clear. No oropharyngeal exudate. Eyes:      General: No scleral icterus. Right eye: No discharge. Left eye: No discharge. Conjunctiva/sclera: Conjunctivae normal.      Pupils: Pupils are equal, round, and reactive to light. Neck:      Thyroid: No thyromegaly. Cardiovascular:      Rate and Rhythm: Normal rate and regular rhythm. Heart sounds: Normal heart sounds. No murmur heard. No friction rub. Pulmonary:      Effort: No respiratory distress. Breath sounds: No stridor.  No wheezing or rales. Abdominal:      General: Bowel sounds are normal.      Palpations: Abdomen is soft. Tenderness: There is no abdominal tenderness. There is no guarding or rebound. Musculoskeletal:         General: No swelling, tenderness or deformity. Normal range of motion. Cervical back: Normal range of motion and neck supple. Right lower leg: No edema. Left lower leg: No edema. Lymphadenopathy:      Cervical: No cervical adenopathy. Skin:     General: Skin is warm and dry. Coloration: Skin is not jaundiced. Findings: No bruising, erythema or rash. Neurological:      General: No focal deficit present. Mental Status: She is alert and oriented to person, place, and time. Mental status is at baseline. Motor: No abnormal muscle tone. Psychiatric:         Mood and Affect: Mood normal.         Behavior: Behavior normal.     *    Lines and drains: All vascular access sites are healthy with no local erythema, discharge or tenderness. Intake and output:    I/O last 3 completed shifts: In: 200 [P.O.:730; IV Piggyback:250]  Out: -     Lab Data:   All available labs and old records have been reviewed by me.     CBC:  Recent Labs     09/18/22  1100 09/19/22  0850 09/20/22  0600   WBC 6.8 6.6 8.1   RBC 4.14 3.91* 3.59*   HGB 11.5* 11.0* 10.3*   HCT 35.4* 33.9* 31.1*    376 296   MCV 85.7 86.8 86.5   MCH 27.8 28.3 28.7   MCHC 32.4 32.5 33.2   RDW 16.9* 17.2* 17.0*          BMP:  Recent Labs     09/18/22  1100 09/19/22  0850 09/20/22  0600    140 138   K 4.2 4.2 4.2    102 101   CO2 30 28 28   BUN 11 14 20   CREATININE 0.8 0.7 0.8   CALCIUM 9.6 9.5 9.1   GLUCOSE 120* 105* 94          Hepatic Function Panel:   Lab Results   Component Value Date/Time    ALKPHOS 110 09/20/2022 06:00 AM    ALT 30 09/20/2022 06:00 AM    AST 56 09/20/2022 06:00 AM    PROT 6.9 09/20/2022 06:00 AM    BILITOT <0.2 09/20/2022 06:00 AM    LABALBU 3.1 09/20/2022 06:00 AM       CPK: No results found for: CKTOTAL  ESR:   Lab Results   Component Value Date    SEDRATE 66 (H) 09/13/2022     CRP:   Lab Results   Component Value Date    CRP 94.4 (H) 09/13/2022           Imaging: All pertinent images and reports for the current visit were reviewed by me during this visit. I reviewed the chest x-ray/CT scan/MRI images and independently interpreted the findings and results today. MRI BRAIN W CONTRAST   Final Result   No abnormal enhancement. RECOMMENDATIONS:   Unavailable         CT BIOPSY DEEP BONE PERCUTANEOUS   Final Result   Successful CT guided core biopsy of the left iliac bone. CT GUIDED NEEDLE PLACEMENT   Final Result   Successful CT guided core biopsy of the left iliac bone. MRI BRAIN WO CONTRAST   Final Result   Minimal hyperintense FLAIR signal within the left parietal lobe   periventricular white matter. This finding likely represents minimal chronic   microvascular disease. However a demyelinating process such as multiple   sclerosis is not excluded. No significant orbital abnormality is identified. MRI PELVIS W WO CONTRAST   Final Result   Acute septic arthritis/osteomyelitis of the left sacroiliac joint, with   adjacent myositis. Pathologic fracture of the left sacral wing. Probable phlegmon extending into the epidural space of the sacral canal.         MRI LUMBAR SPINE W WO CONTRAST   Final Result   No acute abnormality of the L-spine identified. Partially visualized septic arthritis of the left sacroiliac joint. CT CHEST PULMONARY EMBOLISM W CONTRAST   Final Result   Negative examination for pulmonary embolism. Bibasilar dependent atelectasis. No focal infiltrates or pleural effusions. Emphysema and right apical subpleural bulla or blebs         CT ABDOMEN PELVIS W IV CONTRAST Additional Contrast? None   Final Result   1.  Irregularity of the left SI joint with erosive change and fracture at both   the ileal and sacral aspects. This may reflect septic arthritis with   pathologic fractures at those sites. 2. Moderate body wall edema. CT CERVICAL SPINE WO CONTRAST   Final Result   No acute abnormality of the cervical spine. .      Multilevel degenerative disc disease and spurring         CT HEAD WO CONTRAST   Final Result   No acute intracranial abnormality. Medications: All current and past medications were reviewed.      vancomycin  1,500 mg IntraVENous Q24H    acetaminophen  650 mg Oral Q6H    buprenorphine-naloxone  2 Film SubLINGual TID    lidocaine 1 % injection  5 mL IntraDERmal Once    sodium chloride flush  5-40 mL IntraVENous 2 times per day    ciprofloxacin  500 mg Oral 2 times per day    lactobacillus  1 capsule Oral BID WC    sodium chloride flush  10 mL IntraVENous 2 times per day    lamoTRIgine  100 mg Oral Daily        sodium chloride 100 mL/hr at 09/18/22 0212    sodium chloride 25 mL (09/17/22 0342)       diazePAM, sodium chloride flush, sodium chloride, methocarbamol, dicyclomine, gabapentin, hydrOXYzine pamoate, promethazine, traZODone, sodium chloride flush, sodium chloride, magnesium sulfate, promethazine **OR** ondansetron, acetaminophen **OR** acetaminophen      Problem list:       Patient Active Problem List   Diagnosis Code    Encephalopathy G93.40    Polysubstance abuse ([address] F19.10    Acute cystitis without hematuria N30.00    Cellulitis of buttock L03.317    Sacral fracture, closed ([address] S32.10XA    Hypoglycemia E16.2    Severe sepsis (HCC) A41.9, R65.20    Unwitnessed fall R29.6    Pulmonary emphysema ([address] J43.9    Mild bibasilar atelectasis J98.11    Amphetamine abuse ([address] F15.10    Cocaine abuse ([address] F14.10    Fentanyl use disorder, mild, abuse ([address] F11.10    Drug abuse counseling and surveillance of drug abuser Z71.51    Chronic hepatitis C without hepatic coma (HCC) B18.2    Elevated C-reactive protein (CRP) R79.82    Elevated sed rate R70.0    Septic arthritis of sacroiliac joint [facility]) M46.58    Fall W19. Estela Baca    Bandemia I37.318    Acute metabolic encephalopathy B29.35    Tobacco abuse counseling Z71.6       Please note that this chart was generated using Dragon dictation software. Although every effort was made to ensure the accuracy of this automated transcription, some errors in transcription may have occurred inadvertently. If you may need any clarification, please do not hesitate to contact me through EPIC or at the phone number provided below with my electronic signature. Any pictures or media included in this note were obtained after taking informed verbal consent from the patient and with their approval to include those in the patient's medical record. Linda Briseno MD, MPH, [address] [place] [address]  9/20/2022, 4:28 PM  [facility] Infectious Disease   [address] [place] [address], [address]  Office: [phone]  Fax: [phone]  In-person Clinic days:  Tuesday & Thursday a.m. Virtual clinic days: Monday, Wednesday & Friday a.m.

## 2022-09-20 NOTE — PROGRESS NOTES
Hospitalist Progress Note      PCP: No primary care provider on file. Date of Admission: 9/12/2022    Chief Complaint:  [facility] Course: 41 yo F with Hep C, h/o cocaine abuse, fentanyl abuse, amphetamine abuse was brought to ER for AMS. Admitted as inpatient for acute metabolic encephalopathy, L sacroiliitis, sacral and ileal fractures and leukocytosis. Followed by ID and Ortho. For MRI Pelvis and L spine. Started on IV Abx. Echo:  Normal left ventricle size, wall thickness, and systolic function with an   estimated ejection fraction of 60%. No regional wall motion abnormalities are seen. Right atrial yuridia terminalis more prominent. No suggestion of vegetation. IR unable to aspirate SI joint as no fluid noted per IR attending. Changed to Doxycycine on 9/15. MRI Pelvis     Acute septic arthritis/osteomyelitis of the left sacroiliac joint, with   adjacent myositis. Pathologic fracture of the left sacral wing. Probable phlegmon extending into the epidural space of the sacral canal.      Neurosurgery consulted for sacral epidural phlegmon/abscess; recommend   \"Treatment consists of IV antibiotics at the direction of ID. Agree with or through recommendations that in the future she may need SI joint fusion after she has fully recovered from the infection. \"    MRI L spine:  No acute abnormality of the L-spine identified. Partially visualized septic arthritis of the left sacroiliac joint. Patient with R eye vision changes. MRI Brain   Minimal hyperintense FLAIR signal within the left parietal lobe   periventricular white matter. This finding likely represents minimal chronic   microvascular disease. However a demyelinating process such as multiple   sclerosis is not excluded. No significant orbital abnormality is identified. MRI Brain with and w/o contrast:  No abnormal enhancement. PICC placed on 9/17/22. On Vanco IV and PO Cipro.     Underwent IR with normal bowel sounds. Musculoskeletal: Tender L sacroiliac area. Skin: Skin color, texture, turgor normal.  No rashes or lesions. Neurologic:  Neurovascularly intact without any focal sensory/motor deficits. Cranial nerves: II-XII intact, grossly non-focal.  Psychiatric: Alert and oriented, thought content appropriate, normal insight  Capillary Refill: Brisk, 3 seconds, normal   Peripheral Pulses: +2 palpable, equal bilaterally       Labs:   Recent Labs     09/18/22  1100 09/19/22  0850 09/20/22  0600   WBC 6.8 6.6 8.1   HGB 11.5* 11.0* 10.3*   HCT 35.4* 33.9* 31.1*    376 296       Recent Labs     09/18/22  1100 09/19/22  0850 09/20/22  0600    140 138   K 4.2 4.2 4.2    102 101   CO2 30 28 28   BUN 11 14 20   CREATININE 0.8 0.7 0.8   CALCIUM 9.6 9.5 9.1       Recent Labs     09/18/22  1100 09/19/22  0850 09/20/22  0600   AST 53* 59* 56*   ALT 33 34 30   BILITOT <0.2 <0.2 <0.2   ALKPHOS 92 92 110       No results for input(s): INR in the last 72 hours. No results for input(s): Yajaira William in the last 72 hours. Urinalysis:      Lab Results   Component Value Date/Time    NITRU Negative 09/18/2022 02:26 PM    [address] 09/12/2022 06:31 PM    BACTERIA Rare 09/12/2022 06:31 PM    RBCUA 2 09/12/2022 06:31 PM    BLOODU Negative 09/18/2022 02:26 PM    SPECGRAV <=1.005 09/18/2022 02:26 PM    GLUCOSEU Negative 09/18/2022 02:26 PM       Radiology:  MRI BRAIN W CONTRAST   Final Result   No abnormal enhancement. RECOMMENDATIONS:   Unavailable         CT BIOPSY DEEP BONE PERCUTANEOUS   Final Result   Successful CT guided core biopsy of the left iliac bone. CT GUIDED NEEDLE PLACEMENT   Final Result   Successful CT guided core biopsy of the left iliac bone. MRI BRAIN WO CONTRAST   Final Result   Minimal hyperintense FLAIR signal within the left parietal lobe   periventricular white matter. This finding likely represents minimal chronic   microvascular disease.   However a demyelinating process such as multiple   sclerosis is not excluded. No significant orbital abnormality is identified. MRI PELVIS W WO CONTRAST   Final Result   Acute septic arthritis/osteomyelitis of the left sacroiliac joint, with   adjacent myositis. Pathologic fracture of the left sacral wing. Probable phlegmon extending into the epidural space of the sacral canal.         MRI LUMBAR SPINE W WO CONTRAST   Final Result   No acute abnormality of the L-spine identified. Partially visualized septic arthritis of the left sacroiliac joint. CT CHEST PULMONARY EMBOLISM W CONTRAST   Final Result   Negative examination for pulmonary embolism. Bibasilar dependent atelectasis. No focal infiltrates or pleural effusions. Emphysema and right apical subpleural bulla or blebs         CT ABDOMEN PELVIS W IV CONTRAST Additional Contrast? None   Final Result   1. Irregularity of the left SI joint with erosive change and fracture at both   the ileal and sacral aspects. This may reflect septic arthritis with   pathologic fractures at those sites. 2. Moderate body wall edema. CT CERVICAL SPINE WO CONTRAST   Final Result   No acute abnormality of the cervical spine. .      Multilevel degenerative disc disease and spurring         CT HEAD WO CONTRAST   Final Result   No acute intracranial abnormality.                  Assessment/Plan:    Active Hospital Problems    Diagnosis     Tobacco abuse counseling [Z71.6]      Priority: Medium    Polysubstance abuse ([facility] [address] [F19.10]      Priority: Medium    Acute cystitis without hematuria [N30.00]      Priority: Medium    Cellulitis of buttock [M04.662]      Priority: Medium    Sacral fracture, closed ([address] [S32.10XA]      Priority: Medium    Hypoglycemia [E16.2]      Priority: Medium    Severe sepsis ([address] [A41.9, R65.20]      Priority: Medium    Unwitnessed fall [R29.6]      Priority: Medium    Pulmonary emphysema ([address] [J43.9]

## 2022-09-20 NOTE — PROGRESS NOTES
Clinical Pharmacy Note: Pharmacy to Dose Vancomycin    Vancomycin Day: 9  Indication: sepsis, osteo  Current Dose: 1250 mg IV every 24 hours  Dosing Method: Bayesian Modeling    Random: 10.3    Recent Labs     09/19/22  0850 09/20/22  0600   BUN 14 20       Recent Labs     09/19/22  0850 09/20/22  0600   CREATININE 0.7 0.8       Recent Labs     09/19/22  0850 09/20/22  0600   WBC 6.6 8.1         Intake/Output Summary (Last 24 hours) at 9/20/2022 0834  Last data filed at 9/19/2022 2151  Gross per 24 hour   Intake 730 ml   Output --   Net 730 ml         Ht Readings from Last 1 Encounters:   09/19/22 5' (1.524 m)        Wt Readings from Last 1 Encounters:   09/20/22 101 lb 1.6 oz (45.9 kg)         Body mass index is 19.74 kg/m². Estimated Creatinine Clearance: 64 mL/min (based on SCr of 0.8 mg/dL). Assessment/Plan:  Vancomycin level is subtherapeutic. Increase vancomycin regimen to 1500 mg IV every 24 hours. Bayesian Modeling predicts an AUC of 532 mg/L*hr and trough of 13.1 mg/L. A vancomycin random level has been ordered on 9/21 at 0600 for follow-up. Changes in regimen will be determined based on culture results, renal function, and clinical response. Pharmacy will continue to monitor and adjust regimen as necessary.     Thank you for the consult,    Ministerio Molina, PharmD, [address] [facility] Pharmacist  [id number]

## 2022-09-21 VITALS
OXYGEN SATURATION: 94 % | HEIGHT: 60 IN | TEMPERATURE: 98.7 F | BODY MASS INDEX: 19.85 KG/M2 | WEIGHT: 101.1 LBS | SYSTOLIC BLOOD PRESSURE: 106 MMHG | HEART RATE: 87 BPM | RESPIRATION RATE: 18 BRPM | DIASTOLIC BLOOD PRESSURE: 68 MMHG

## 2022-09-21 LAB
A/G RATIO: 0.9 (ref 1.1–2.2)
ALBUMIN SERPL-MCNC: 3.3 G/DL (ref 3.4–5)
ALP BLD-CCNC: 113 U/L (ref 40–129)
ALT SERPL-CCNC: 30 U/L (ref 10–40)
ANION GAP SERPL CALCULATED.3IONS-SCNC: 9 MMOL/L (ref 3–16)
AST SERPL-CCNC: 55 U/L (ref 15–37)
BASOPHILS ABSOLUTE: 0.1 K/UL (ref 0–0.2)
BASOPHILS RELATIVE PERCENT: 1.3 %
BILIRUB SERPL-MCNC: <0.2 MG/DL (ref 0–1)
BUN BLDV-MCNC: 11 MG/DL (ref 7–20)
CALCIUM SERPL-MCNC: 9.1 MG/DL (ref 8.3–10.6)
CALCULI COMPOSITION: NORMAL
CHLORIDE BLD-SCNC: 103 MMOL/L (ref 99–110)
CO2: 26 MMOL/L (ref 21–32)
CREAT SERPL-MCNC: 0.7 MG/DL (ref 0.6–1.1)
EOSINOPHILS ABSOLUTE: 0.2 K/UL (ref 0–0.6)
EOSINOPHILS RELATIVE PERCENT: 2.7 %
GFR AFRICAN AMERICAN: >60
GFR NON-AFRICAN AMERICAN: >60
GLUCOSE BLD-MCNC: 109 MG/DL (ref 70–99)
GONADOTROPIN, CHORIONIC (HCG) QUANT: <5 MIU/ML
HCT VFR BLD CALC: 30.9 % (ref 36–48)
HEMOGLOBIN: 9.9 G/DL (ref 12–16)
LYMPHOCYTES ABSOLUTE: 2.3 K/UL (ref 1–5.1)
LYMPHOCYTES RELATIVE PERCENT: 28.4 %
MASS: 55 MG
MCH RBC QN AUTO: 27.7 PG (ref 26–34)
MCHC RBC AUTO-ENTMCNC: 32.1 G/DL (ref 31–36)
MCV RBC AUTO: 86.3 FL (ref 80–100)
MONOCYTES ABSOLUTE: 0.6 K/UL (ref 0–1.3)
MONOCYTES RELATIVE PERCENT: 7.2 %
NEUTROPHILS ABSOLUTE: 4.8 K/UL (ref 1.7–7.7)
NEUTROPHILS RELATIVE PERCENT: 60.4 %
PDW BLD-RTO: 17.3 % (ref 12.4–15.4)
PLATELET # BLD: 285 K/UL (ref 135–450)
PMV BLD AUTO: 6.6 FL (ref 5–10.5)
POTASSIUM REFLEX MAGNESIUM: 4.2 MMOL/L (ref 3.5–5.1)
RBC # BLD: 3.58 M/UL (ref 4–5.2)
SODIUM BLD-SCNC: 138 MMOL/L (ref 136–145)
STONE DESCRIPTION: NORMAL
TOTAL PROTEIN: 7 G/DL (ref 6.4–8.2)
VANCOMYCIN RANDOM: 8.4 UG/ML
WBC # BLD: 8 K/UL (ref 4–11)

## 2022-09-21 PROCEDURE — 85025 COMPLETE CBC W/AUTO DIFF WBC: CPT

## 2022-09-21 PROCEDURE — 2580000003 HC RX 258: Performed by: INTERNAL MEDICINE

## 2022-09-21 PROCEDURE — 6370000000 HC RX 637 (ALT 250 FOR IP): Performed by: INTERNAL MEDICINE

## 2022-09-21 PROCEDURE — 84702 CHORIONIC GONADOTROPIN TEST: CPT

## 2022-09-21 PROCEDURE — 36415 COLL VENOUS BLD VENIPUNCTURE: CPT

## 2022-09-21 PROCEDURE — 80053 COMPREHEN METABOLIC PANEL: CPT

## 2022-09-21 PROCEDURE — 80202 ASSAY OF VANCOMYCIN: CPT

## 2022-09-21 RX ORDER — METHOCARBAMOL 750 MG/1
750 TABLET, FILM COATED ORAL EVERY 6 HOURS PRN
Qty: 40 TABLET | Refills: 0 | Status: SHIPPED | OUTPATIENT
Start: 2022-09-21 | End: 2022-10-01

## 2022-09-21 RX ORDER — HYDROXYZINE PAMOATE 50 MG/1
50 CAPSULE ORAL EVERY 8 HOURS PRN
Qty: 42 CAPSULE | Refills: 0 | Status: SHIPPED | OUTPATIENT
Start: 2022-09-21 | End: 2022-10-05

## 2022-09-21 RX ORDER — GREEN TEA/HOODIA GORDONII 315-12.5MG
1 CAPSULE ORAL 2 TIMES DAILY
Qty: 90 TABLET | Refills: 0 | Status: SHIPPED | OUTPATIENT
Start: 2022-09-21 | End: 2022-11-05

## 2022-09-21 RX ORDER — LINEZOLID 600 MG/1
600 TABLET, FILM COATED ORAL EVERY 12 HOURS SCHEDULED
Status: DISCONTINUED | OUTPATIENT
Start: 2022-09-21 | End: 2022-09-21 | Stop reason: HOSPADM

## 2022-09-21 RX ORDER — CIPROFLOXACIN 500 MG/1
500 TABLET, FILM COATED ORAL EVERY 12 HOURS SCHEDULED
Qty: 80 TABLET | Refills: 0 | Status: SHIPPED | OUTPATIENT
Start: 2022-09-21 | End: 2022-10-31

## 2022-09-21 RX ORDER — LINEZOLID 600 MG/1
600 TABLET, FILM COATED ORAL EVERY 12 HOURS SCHEDULED
Qty: 28 TABLET | Refills: 0 | Status: SHIPPED | OUTPATIENT
Start: 2022-09-21 | End: 2022-10-05

## 2022-09-21 RX ORDER — DICYCLOMINE HYDROCHLORIDE 10 MG/1
20 CAPSULE ORAL EVERY 6 HOURS PRN
Qty: 60 CAPSULE | Refills: 0 | Status: SHIPPED | OUTPATIENT
Start: 2022-09-21

## 2022-09-21 RX ORDER — GABAPENTIN 300 MG/1
300 CAPSULE ORAL EVERY 8 HOURS PRN
Qty: 90 CAPSULE | Refills: 0 | Status: SHIPPED | OUTPATIENT
Start: 2022-09-21 | End: 2022-10-21

## 2022-09-21 RX ORDER — PROMETHAZINE HYDROCHLORIDE 25 MG/1
12.5 TABLET ORAL EVERY 6 HOURS PRN
Qty: 15 TABLET | Refills: 0 | Status: SHIPPED | OUTPATIENT
Start: 2022-09-21 | End: 2022-09-29

## 2022-09-21 RX ADMIN — ACETAMINOPHEN 650 MG: 325 TABLET ORAL at 08:59

## 2022-09-21 RX ADMIN — Medication 1 CAPSULE: at 08:59

## 2022-09-21 RX ADMIN — CIPROFLOXACIN 500 MG: 500 TABLET, FILM COATED ORAL at 08:59

## 2022-09-21 RX ADMIN — DIAZEPAM 5 MG: 5 TABLET ORAL at 11:45

## 2022-09-21 RX ADMIN — LAMOTRIGINE 100 MG: 100 TABLET ORAL at 08:59

## 2022-09-21 RX ADMIN — HYDROXYZINE PAMOATE 50 MG: 25 CAPSULE ORAL at 09:01

## 2022-09-21 RX ADMIN — Medication 10 ML: at 09:02

## 2022-09-21 RX ADMIN — GABAPENTIN 300 MG: 300 CAPSULE ORAL at 08:59

## 2022-09-21 RX ADMIN — LINEZOLID 600 MG: 600 TABLET, FILM COATED ORAL at 11:45

## 2022-09-21 NOTE — DISCHARGE SUMMARY
Hospital Medicine Discharge Summary    Patient: Jannet Geiger     Gender: female  : 1975   Age: 42 y.o. MRN: [id number]    Admitting Physician: Trice Black DO  Discharge Physician: Arelis Sorenson MD     Code Status: Full Code     Admit Date: 2022   Discharge Date:   22    Disposition:  Home    Discharge Diagnoses: Active Hospital Problems    Diagnosis Date Noted    Tobacco abuse counseling [Z71.6] 2022     Priority: Medium    Polysubstance abuse ([address] [F19.10] 2022     Priority: Medium    Acute cystitis without hematuria [N30.00] 2022     Priority: Medium    Cellulitis of buttock [U35.404] 2022     Priority: Medium    Sacral fracture, closed ([address] [S32.10XA] 2022     Priority: Medium    Hypoglycemia [E16.2] 2022     Priority: Medium    Severe sepsis ([address] [A41.9, R65.20] 2022     Priority: Medium    Unwitnessed fall [R29.6] 2022     Priority: Medium    Pulmonary emphysema ([address] [J43.9] 2022     Priority: Medium    Mild bibasilar atelectasis [J98.11] 2022     Priority: Medium    Amphetamine abuse ([address] [F15.10] 2022     Priority: Medium    Cocaine abuse ([address] [F14.10] 2022     Priority: Medium    Fentanyl use disorder, mild, abuse ([address] [F11.10] 2022     Priority: Medium    Drug abuse counseling and surveillance of drug abuser [Z71.51] 2022     Priority: Medium    Chronic hepatitis C without hepatic coma ([address] [B18.2] 2022     Priority: Medium    Elevated C-reactive protein (CRP) [R79.82] 2022     Priority: Medium    Elevated sed rate [R70.0] 2022     Priority: Medium    Septic arthritis of sacroiliac joint ([address] [M46.58] 2022     Priority: Medium    Fall [W19. XXXA] 2022     Priority: Medium    Bandemia [D72.825] 2022     Priority: Medium    Acute metabolic encephalopathy [Y48.55] 2022     Priority: Medium    Encephalopathy [G93.40] 2022     Priority: Medium       Follow-up appointments:  one week    Outpatient to do list: Establish primary care. Do not use drugs, smoke or drink. F/U with ID and Neurosurgery. Condition at Discharge:  Stable    Hospital Course:   43 yo F with Hep C, h/o cocaine abuse, fentanyl abuse, amphetamine abuse was brought to ER for AMS. Admitted as inpatient for acute metabolic encephalopathy, L sacroiliitis, sacral and ileal fractures and leukocytosis. Followed by ID and Ortho. For MRI Pelvis and L spine. Started on IV Abx. Echo:  Normal left ventricle size, wall thickness, and systolic function with an   estimated ejection fraction of 60%. No regional wall motion abnormalities are seen. Right atrial yuridia terminalis more prominent. No suggestion of vegetation. IR unable to aspirate SI joint as no fluid noted per IR attending. Changed to Doxycycine on 9/15. MRI Pelvis     Acute septic arthritis/osteomyelitis of the left sacroiliac joint, with   adjacent myositis. Pathologic fracture of the left sacral wing. Probable phlegmon extending into the epidural space of the sacral canal.      Neurosurgery consulted for sacral epidural phlegmon/abscess; recommend   \"Treatment consists of IV antibiotics at the direction of ID. Agree with or through recommendations that in the future she may need SI joint fusion after she has fully recovered from the infection. \"    MRI L spine:  No acute abnormality of the L-spine identified. Partially visualized septic arthritis of the left sacroiliac joint. Patient with R eye vision changes. MRI Brain   Minimal hyperintense FLAIR signal within the left parietal lobe   periventricular white matter. This finding likely represents minimal chronic   microvascular disease. However a demyelinating process such as multiple   sclerosis is not excluded. No significant orbital abnormality is identified.       MRI Brain with and w/o contrast:  No abnormal enhancement. PICC placed on 9/17/22. On Vanco IV and PO Cipro. Underwent IR L iliac bone biopsy on 9/19. Stopped Suboxone on 9/20. CT Guided bx did not have cultures sent as ordered. ID recommends PO Zyvox and Cipro. PICC removed. Offered home care. Dr Cristiano Crenshaw (ID) to order labs and f/u with patient in 4 weeks. F/U with NS. Discharge Medications:   Current Discharge Medication List        START taking these medications    Details   ciprofloxacin (CIPRO) 500 MG tablet Take 1 tablet by mouth every 12 hours  Qty: 80 tablet, Refills: 0      linezolid (ZYVOX) 600 MG tablet Take 1 tablet by mouth every 12 hours for 14 days  Qty: 28 tablet, Refills: 0      Probiotic Acidophilus (FLORANEX) TABS Take 1 tablet by mouth 2 times daily  Qty: 90 tablet, Refills: 0      dicyclomine (BENTYL) 10 MG capsule Take 2 capsules by mouth every 6 hours as needed (Abdominal Cramping)  Qty: 60 capsule, Refills: 0      gabapentin (NEURONTIN) 300 MG capsule Take 1 capsule by mouth every 8 hours as needed (Neuropathic Pain) for up to 30 days.   Qty: 90 capsule, Refills: 0    Associated Diagnoses: Polysubstance abuse (HCC)      hydrOXYzine pamoate (VISTARIL) 50 MG capsule Take 1 capsule by mouth every 8 hours as needed for Anxiety (Lacrimation, Rhinorrhea)  Qty: 42 capsule, Refills: 0      promethazine (PHENERGAN) 25 MG tablet Take 0.5 tablets by mouth every 6 hours as needed for Nausea (Restless Leg Symptoms)  Qty: 15 tablet, Refills: 0      methocarbamol (ROBAXIN) 750 MG tablet Take 1 tablet by mouth every 6 hours as needed (myalgia)  Qty: 40 tablet, Refills: 0           Current Discharge Medication List        Current Discharge Medication List        Current Discharge Medication List        STOP taking these medications       methadone (METHADOSE) 40 MG disintegrating tablet Comments:   Reason for Stopping:         lamoTRIgine (LAMICTAL) 100 MG tablet Comments:   Reason for Stopping:               Discharge Exam:    BP 106/68   Pulse 87   Temp 98.7 °F (37.1 °C) (Oral)   Resp 18   Ht 5' (1.524 m)   Wt 101 lb 1.6 oz (45.9 kg)   LMP  (LMP Unknown)   SpO2 94%   BMI 19.74 kg/m²   General appearance: No apparent distress, appears stated age and cooperative. HEENT: Pupils equal, round, and reactive to light. Conjunctivae/corneas clear. Neck: Supple, with full range of motion. No jugular venous distention. Trachea midline. Respiratory:  Normal respiratory effort. Clear to auscultation, bilaterally without Rales/Wheezes/Rhonchi. Cardiovascular: Regular rate and rhythm with normal S1/S2 without murmurs, rubs or gallops. Abdomen: Soft, non-tender, non-distended with normal bowel sounds. Musculoskeletal: Less tender L sacroiliac area. Skin: Skin color, texture, turgor normal.  No rashes or lesions. Neurologic:  Neurovascularly intact without any focal sensory/motor deficits. Cranial nerves: II-XII intact, grossly non-focal.  Psychiatric: Alert and oriented, thought content appropriate, normal insight  Capillary Refill: Brisk, 3 seconds, normal   Peripheral Pulses: +2 palpable, equal bilaterally        Labs:  For convenience and continuity at follow-up the following most recent labs are provided:    Lab Results   Component Value Date/Time    WBC 8.0 09/21/2022 06:23 AM    HGB 9.9 09/21/2022 06:23 AM    HCT 30.9 09/21/2022 06:23 AM    MCV 86.3 09/21/2022 06:23 AM     09/21/2022 06:23 AM     09/21/2022 06:23 AM    K 4.2 09/21/2022 06:23 AM     09/21/2022 06:23 AM    CO2 26 09/21/2022 06:23 AM    BUN 11 09/21/2022 06:23 AM    CREATININE 0.7 09/21/2022 06:23 AM    CALCIUM 9.1 09/21/2022 06:23 AM    ALKPHOS 113 09/21/2022 06:23 AM    ALT 30 09/21/2022 06:23 AM    AST 55 09/21/2022 06:23 AM    BILITOT <0.2 09/21/2022 06:23 AM    LABALBU 3.3 09/21/2022 06:23 AM     Lab Results   Component Value Date    INR 1.12 09/12/2022       Radiology:  Echo Complete    Result Date: 9/13/2022  Transthoracic Echocardiography Report (TTE)  Demographics   Patient Name       Natividad Shipley   Date of Study      09/13/2022         Gender              Female   Patient Number     [id number]         Date of Birth       1975   Visit Number       [id number]          Age                 42 year(s)   Accession Number   [id number]         Room Number         0084   Corporate ID       [id number]           Joyce Art RVT, [facility]   Ordering Physician Natalie Velarde,  Interpreting        Louie Mccormack DO                 Physician           DO HELIO, [facility]  Procedure Type of Study   TTE procedure:ECHOCARDIOGRAM COMPLETE 2D W DOPPLER W COLOR. Procedure Date Date: 09/13/2022 Start: 03:06 PM Study Location: [facility] - Echo Lab Technical Quality: Limited visualization due to poor acoustical window. Additional Indications:R/O vegetation. Patient Status: Routine Height: 60 inches Weight: 107 pounds BSA: 1.43 m2 BMI: 20.9 kg/m2 BP: 103/63 mmHg  Conclusions   Summary  Technically limited study due to poor windows. Normal left ventricle size, wall thickness, and systolic function with an  estimated ejection fraction of 60%. No regional wall motion abnormalities are seen. Right atrial yuridia terminalis more prominent. No suggestion of vegetation. Signature   ------------------------------------------------------------------  Electronically signed by Jass Merrill, [facility]  (Interpreting physician) on 09/13/2022 at 05:12 PM  ------------------------------------------------------------------   Findings   Left Ventricle  Normal left ventricle size, wall thickness, and systolic function with an  estimated ejection fraction of 60%. No regional wall motion abnormalities are seen. Mitral Valve  Mitral valve appears structurally normal.  No evidence of mitral regurgitation. Left Atrium  The left atrium is normal in size.    Aortic Valve  The aortic valve appears structurally normal.  No evidence of aortic valve regurgitation. Aorta  The aortic root is normal in size. Right Ventricle  The right ventricular size is normal.   Tricuspid Valve  No evidence of tricuspid regurgitation. Right Atrium  The right atrium is normal in size. *Refer to tricuspid valve comments. Pulmonic Valve  The pulmonic valve appears normal in structure. No evidence of pulmonic valve regurgitation. Pericardial Effusion  No pericardial effusion noted. Miscellaneous  IVC is normal in size (< 2.1 cm) and collapses > 50% with respiration  consistent with normal RA pressure (3 mmHg). M-Mode/2D Measurements (cm)   LV Diastolic Dimension: 5.95 cm LV Systolic Dimension: 4.73 cm  LV Septum Diastolic: 1.75 cm  LV PW Diastolic: 0.42 cm        AO Root Dimension: 2.7 cm                                   LA Area: 16.7 cm2  LVOT: 1.9 cm                    LA volume/Index: 47.9 ml /33 ml/m2  Aorta   Aortic Root: 2.7 cm  LVOT Diameter: 1.9 cm      CT HEAD WO CONTRAST    Result Date: 9/12/2022  EXAMINATION: CT OF THE HEAD WITHOUT CONTRAST  9/12/2022 7:28 pm TECHNIQUE: CT of the head was performed without the administration of intravenous contrast. Automated exposure control, iterative reconstruction, and/or weight based adjustment of the mA/kV was utilized to reduce the radiation dose to as low as reasonably achievable. COMPARISON: None. HISTORY: ORDERING SYSTEM PROVIDED HISTORY: fall TECHNOLOGIST PROVIDED HISTORY: Has a \"code stroke\" or \"stroke alert\" been called? ->No Reason for exam:->fall Decision Support Exception - unselect if not a suspected or confirmed emergency medical condition->Emergency Medical Condition (MA) Is the patient pregnant?->No Reason for Exam: fall FINDINGS: BRAIN/VENTRICLES: There is no acute intracranial hemorrhage, mass effect or midline shift. No abnormal extra-axial fluid collection.   The gray-white differentiation is maintained without evidence of an acute infarct. There is no evidence of hydrocephalus. ORBITS: The visualized portion of the orbits demonstrate no acute abnormality. SINUSES: There is left maxillary sinus mucosal thickening SOFT TISSUES/SKULL:  No acute abnormality of the visualized skull or soft tissues. No acute intracranial abnormality. CT CERVICAL SPINE WO CONTRAST    Result Date: 9/12/2022  EXAMINATION: CT OF THE CERVICAL SPINE WITHOUT CONTRAST 9/12/2022 7:28 pm TECHNIQUE: CT of the cervical spine was performed without the administration of intravenous contrast. Multiplanar reformatted images are provided for review. Automated exposure control, iterative reconstruction, and/or weight based adjustment of the mA/kV was utilized to reduce the radiation dose to as low as reasonably achievable. COMPARISON: None. HISTORY: ORDERING SYSTEM PROVIDED HISTORY: fall TECHNOLOGIST PROVIDED HISTORY: Reason for exam:->fall Decision Support Exception - unselect if not a suspected or confirmed emergency medical condition->Emergency Medical Condition (MA) Is the patient pregnant?->No Reason for Exam: fall FINDINGS: BONES/ALIGNMENT: There is no acute fracture or traumatic malalignment. DEGENERATIVE CHANGES: There is intervertebral disc space narrowing at all levels extending from C3-C7. This is most pronounced at C5-6. There is endplate spurring. SOFT TISSUES: There is no prevertebral soft tissue swelling. No acute abnormality of the cervical spine. . Multilevel degenerative disc disease and spurring     CT GUIDED NEEDLE PLACEMENT    Result Date: 9/19/2022  PROCEDURE: CT GUIDED CORE NEEDLE BONE BIOPSY OF THE LEFT ILIAC BONE.  MODERATE CONSCIOUS SEDATION 9/19/2022 HISTORY: ORDERING SYSTEM PROVIDED HISTORY: Left SI joint area osteomyelitis, requesting affected bone biopsy for surgical pathology, bacterial, fungal and AFB cultures TECHNOLOGIST PROVIDED HISTORY: Reason for exam:->Left SI joint area osteomyelitis, requesting affected bone biopsy for surgical pathology, bacterial, fungal and AFB cultures Is the patient pregnant?->No Reason for Exam: Left SI joint area osteomyelitis, requesting affected bone biopsy for surgical pathology, bacterial, fungal and AFB cultures SEDATION: 3 mgversed was titrated intravenously for moderate sedation monitored under my direction. Total intraservice time of sedation was 10 minutes. The patient's vital signs were monitored throughout the procedure and recorded in the patient's medical record by the nurse. TECHNIQUE: Informed consent was obtained following a detailed explanation of the procedure including risks, benefits, and alternatives. Universal protocol was followed. Sterile gowns, masks, hats and gloves utilized for maximal sterile barrier. Axial images were obtained through the iliac bones using CT guidance and a suitable skin site was prepped and draped in sterile fashion. Local anesthesia was achieved with lidocaine. An 11 gauge [employer] bone marrow biopsy needle was advanced into the left iliac bone and 2 core biopsy specimens were obtained and the patient tolerated the procedure well. Estimated blood loss: Less than 5 cc Automated exposure control, iterative reconstruction, and/or weight based adjustment of the mA/kV was utilized to reduce the radiation dose to as low as reasonably achievable. DLP: 155.64 mGy-cm     Successful CT guided core biopsy of the left iliac bone. MRI BRAIN W CONTRAST    Result Date: 9/19/2022  EXAMINATION: MRI OF THE BRAIN WITH CONTRAST  9/19/2022 6:50 pm TECHNIQUE: Multiplanar multisequence MRI of the head/brain was performed with the administration of intravenous contrast. COMPARISON: 09/17/2022 HISTORY: ORDERING SYSTEM PROVIDED HISTORY: ? MS with R vision changes TECHNOLOGIST PROVIDED HISTORY: Reason for exam:->? MS with R vision changes Reason for Exam: ?  MS with R vision changes  gfr over 60  inj. 9 ml of multihance FINDINGS: Pre and postcontrast images are available for review. There is no intrinsic T1 hyperintensity. There is no abnormal intraparenchymal enhancement. There is no enhancement within the area of left parietal FLAIR hyperintensity described previously. No definite enhancement in the optic nerves on this nondedicated protocol. No abnormal enhancement. RECOMMENDATIONS: Unavailable     MRI LUMBAR SPINE W WO CONTRAST    Result Date: 9/15/2022  EXAMINATION: MRI OF THE LUMBAR SPINE WITHOUT AND WITH CONTRAST  9/15/2022 6:54 pm TECHNIQUE: Multiplanar multisequence MRI of the lumbar spine was performed without and with the administration of intravenous contrast. COMPARISON: None. HISTORY: ORDERING SYSTEM PROVIDED HISTORY: Back pain, r/o epidural TECHNOLOGIST PROVIDED HISTORY: Reason for exam:->Back pain, r/o epidural Reason for Exam: back pain, r/o septic arthritis  inj. 10 ml of multihance gfr over 60 today FINDINGS: BONES/ALIGNMENT: There is a normal lumbar lordosis. No acute fracture or traumatic subluxation is identified. There is no MRI evidence of discitis. No abnormal enhancement of the L-spine is identified. There is partially visualized fluid within the left sacroiliac joint, with adjacent marrow edema and enhancement. There is also abnormal enhancement of the adjacent iliacus and gluteal muscles consistent with myositis. SPINAL CORD:  The conus terminates normally. SOFT TISSUES: See above. L1-L2: There is no significant disc protrusion, spinal canal stenosis or neural foraminal narrowing. L2-L3: There is no significant disc protrusion, spinal canal stenosis or neural foraminal narrowing. L3-L4: There is no significant disc protrusion, spinal canal stenosis or neural foraminal narrowing. L4-L5: There is no significant disc protrusion, spinal canal stenosis or neural foraminal narrowing. L5-S1: There is no significant disc protrusion, spinal canal stenosis or neural foraminal narrowing. No acute abnormality of the L-spine identified.  Partially visualized septic arthritis of the left sacroiliac joint. MRI PELVIS W WO CONTRAST    Result Date: 9/15/2022  EXAMINATION: MRI OF THE PELVIS WITHOUT AND WITH CONTRAST, 9/15/2022 7:24 pm TECHNIQUE: Multiplanar multisequence MRI of the pelvis was performed without and with the administration of intravenous contrast. COMPARISON: None. HISTORY: ORDERING SYSTEM PROVIDED HISTORY: back pain, r/o septic arthritis TECHNOLOGIST PROVIDED HISTORY: Reason for exam:->back pain, r/o septic arthritis Reason for Exam: back pain, r/o septic arthritis  inj. 10 ml of multihance gfr over 60 today FINDINGS: There is fluid within the left sacroiliac joint with adjacent marrow edema and enhancement postcontrast administration, consistent with septic arthritis. Trace pelvic free fluid is noted. There is mild presacral edema. Mild bilateral hip effusions are present. There is edema and enhancement of the left iliacus, internal , piriformis, and gluteal muscles consistent with myositis. Enhancing soft tissue appears to extend into the left-sided sacral canal on image number 20 of series 10, consistent with phlegmon. A pathologic fracture of the left sacral wing is seen. Acute septic arthritis/osteomyelitis of the left sacroiliac joint, with adjacent myositis. Pathologic fracture of the left sacral wing. Probable phlegmon extending into the epidural space of the sacral canal.     CT ABDOMEN PELVIS W IV CONTRAST Additional Contrast? None    Result Date: 9/12/2022  EXAMINATION: CT OF THE ABDOMEN AND PELVIS WITH CONTRAST 9/12/2022 7:28 pm TECHNIQUE: CT of the abdomen and pelvis was performed with the administration of intravenous contrast. Multiplanar reformatted images are provided for review. Automated exposure control, iterative reconstruction, and/or weight based adjustment of the mA/kV was utilized to reduce the radiation dose to as low as reasonably achievable. COMPARISON: None.  HISTORY: ORDERING SYSTEM PROVIDED HISTORY: left hip cellulitis/abscess? TECHNOLOGIST PROVIDED HISTORY: Reason for exam:->left hip cellulitis/abscess? Additional Contrast?->None Decision Support Exception - unselect if not a suspected or confirmed emergency medical condition->Emergency Medical Condition (MA) Reason for Exam: left hip cellulitis/abscess? FINDINGS: Lower Chest: Unremarkable. Organs: Liver is normal in contour and enhancement. Gallbladder is unremarkable. No biliary ductal dilatation. Pancreas, adrenals, kidneys, spleen, vasculature are unremarkable. GI/Bowel: Large volume stool throughout the colon. Appendix is normal. Pelvis: Unremarkable. Peritoneum/Retroperitoneum: No free air, free fluid, organized fluid collection, lymphadenopathy. Bones/Soft Tissues: Moderate body wall edema. Irregularity of the left SI joint with erosive change and fracture at both the ileal and sacral aspects. 1. Irregularity of the left SI joint with erosive change and fracture at both the ileal and sacral aspects. This may reflect septic arthritis with pathologic fractures at those sites. 2. Moderate body wall edema. CT CHEST PULMONARY EMBOLISM W CONTRAST    Result Date: 9/12/2022  EXAMINATION: CTA OF THE CHEST 9/12/2022 7:28 pm TECHNIQUE: CTA of the chest was performed after the administration of intravenous contrast.  Multiplanar reformatted images are provided for review. MIP images are provided for review. Automated exposure control, iterative reconstruction, and/or weight based adjustment of the mA/kV was utilized to reduce the radiation dose to as low as reasonably achievable. COMPARISON: None.  HISTORY: ORDERING SYSTEM PROVIDED HISTORY: tachy - ivdu - sirs - septic emboli TECHNOLOGIST PROVIDED HISTORY: Reason for exam:->tachy - ivdu - sirs - septic emboli Decision Support Exception - unselect if not a suspected or confirmed emergency medical condition->Emergency Medical Condition (MA) Reason for Exam: tachy - ivdu - sirs - septic emboli FINDINGS: Pulmonary Arteries: Pulmonary arteries are adequately opacified for evaluation. No evidence of intraluminal filling defect to suggest pulmonary embolism. Main pulmonary artery is normal in caliber. Mediastinum: No evidence of mediastinal lymphadenopathy. The heart and pericardium demonstrate no acute abnormality. There is no acute abnormality of the thoracic aorta. Lungs/pleura: The airways are patent. There is dependent bibasilar atelectasis. There are no confluent infiltrates or pleural effusions. There are right apical subpleural bulla or blebs. There is mild emphysema. Upper Abdomen: Limited images of the upper abdomen are unremarkable. Soft Tissues/Bones: No acute bone or soft tissue abnormality. Negative examination for pulmonary embolism. Bibasilar dependent atelectasis. No focal infiltrates or pleural effusions. Emphysema and right apical subpleural bulla or blebs     CT BIOPSY DEEP BONE PERCUTANEOUS    Result Date: 9/19/2022  PROCEDURE: CT GUIDED CORE NEEDLE BONE BIOPSY OF THE LEFT ILIAC BONE. MODERATE CONSCIOUS SEDATION 9/19/2022 HISTORY: ORDERING SYSTEM PROVIDED HISTORY: Left SI joint area osteomyelitis, requesting affected bone biopsy for surgical pathology, bacterial, fungal and AFB cultures TECHNOLOGIST PROVIDED HISTORY: Reason for exam:->Left SI joint area osteomyelitis, requesting affected bone biopsy for surgical pathology, bacterial, fungal and AFB cultures Is the patient pregnant?->No Reason for Exam: Left SI joint area osteomyelitis, requesting affected bone biopsy for surgical pathology, bacterial, fungal and AFB cultures SEDATION: 3 mgversed was titrated intravenously for moderate sedation monitored under my direction. Total intraservice time of sedation was 10 minutes. The patient's vital signs were monitored throughout the procedure and recorded in the patient's medical record by the nurse.  TECHNIQUE: Informed consent was obtained following a detailed explanation of the procedure including risks, benefits, and alternatives. Universal protocol was followed. Sterile gowns, masks, hats and gloves utilized for maximal sterile barrier. Axial images were obtained through the iliac bones using CT guidance and a suitable skin site was prepped and draped in sterile fashion. Local anesthesia was achieved with lidocaine. An 11 gauge [employer] bone marrow biopsy needle was advanced into the left iliac bone and 2 core biopsy specimens were obtained and the patient tolerated the procedure well. Estimated blood loss: Less than 5 cc Automated exposure control, iterative reconstruction, and/or weight based adjustment of the mA/kV was utilized to reduce the radiation dose to as low as reasonably achievable. DLP: 155.64 mGy-cm     Successful CT guided core biopsy of the left iliac bone. MRI BRAIN WO CONTRAST    Result Date: 9/17/2022  EXAMINATION: MRI OF THE BRAIN WITHOUT CONTRAST  9/17/2022 7:13 am TECHNIQUE: Multiplanar multisequence MRI of the brain was performed without the administration of intravenous contrast. COMPARISON: None HISTORY: ORDERING SYSTEM PROVIDED HISTORY: Vision loss R eye TECHNOLOGIST PROVIDED HISTORY: Reason for exam:->Vision loss R eye Reason for Exam: Vision loss rt. Eye. Scan was ordered with and without but scan was revised due to IV infiltration FINDINGS: INTRACRANIAL STRUCTURES/VENTRICLES: There is no acute infarct. No mass effect or midline shift. No evidence of an acute intracranial hemorrhage. The ventricles and sulci are normal in size and configuration. The sellar/suprasellar regions appear unremarkable. The normal signal voids within the major intracranial vessels appear maintained. Minimal signal is identified within the left parietal lobe periventricular white matter likely representing minimal chronic microvascular disease. Demyelination is in the differential diagnosis. ORBITS: The visualized portion of the orbits demonstrate no acute abnormality.  SINUSES: Moderate mucosal thickening is identified within the left maxillary sinus. Small right mastoid effusion is noted. BONES/SOFT TISSUES: The bone marrow signal intensity appears normal. The soft tissues demonstrate no acute abnormality. Minimal hyperintense FLAIR signal within the left parietal lobe periventricular white matter. This finding likely represents minimal chronic microvascular disease. However a demyelinating process such as multiple sclerosis is not excluded. No significant orbital abnormality is identified. The patient was seen and examined on day of discharge and this discharge summary is in conjunction with any daily progress note from day of discharge. Time Spent on discharge is 45 minutes  in the examination, evaluation, counseling and review of medications and discharge plan. Note that more than 30 minutes was spent in preparing discharge papers, discussing discharge with patient, medication review, etc.       Signed:    Jacqueline Del Rosario MD   9/21/2022      Thank you No primary care provider on file. for the opportunity to be involved in this patient's care.  If you have any questions or concerns please feel free to contact me at [address] [place] [address]

## 2022-09-21 NOTE — ACP (ADVANCE CARE PLANNING)
Advanced Care Planning Note. Purpose of Encounter: Advanced care planning in light of acute metabolic encephalopathy  Parties In Attendance: Patient  Decisional Capacity: Yes  Subjective: Patient with pain all over  Objective: Cr 0.7  Goals of Care Determination: Patient wants full support (CPR, vent, surgery, HD, trach, PEG)  Plan:  Abx. MRI Brain. NS consult. Ortho and ID consults  Code Status: Full code   Time spent on Advanced care Plannin minutes  Advanced Care Planning Documents: Completed advanced directives on chart, boyfriend is the POA.     Sienna Banda MD  2022 12:18 PM

## 2022-09-21 NOTE — PROGRESS NOTES
Patient's PICC line removed @ 1130. Patient instructed that she would have to wait one hour before being discharged so that we can watch the site. Patient also instructed that this RN will return with discharge paperwork and that [facility] will bring meds for her. While completing discharge paperwork this RN was informed that the patient was no longer in her room and could not be found on the floor.

## 2022-09-22 ENCOUNTER — TELEPHONE (OUTPATIENT)
Dept: INFECTIOUS DISEASES | Age: 47
End: 2022-09-22
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
